# Patient Record
Sex: FEMALE | Race: ASIAN | NOT HISPANIC OR LATINO | Employment: UNEMPLOYED | URBAN - METROPOLITAN AREA
[De-identification: names, ages, dates, MRNs, and addresses within clinical notes are randomized per-mention and may not be internally consistent; named-entity substitution may affect disease eponyms.]

---

## 2019-09-27 ENCOUNTER — OFFICE VISIT - HEALTHEAST (OUTPATIENT)
Dept: FAMILY MEDICINE | Facility: CLINIC | Age: 24
End: 2019-09-27

## 2019-09-27 DIAGNOSIS — Z32.01 PREGNANCY TEST POSITIVE: ICD-10-CM

## 2019-09-27 DIAGNOSIS — O03.9 MISCARRIAGE: ICD-10-CM

## 2019-09-27 DIAGNOSIS — J45.21 MILD INTERMITTENT ASTHMA WITH EXACERBATION: ICD-10-CM

## 2019-09-27 DIAGNOSIS — R73.02 IGT (IMPAIRED GLUCOSE TOLERANCE): ICD-10-CM

## 2019-09-27 DIAGNOSIS — E55.9 VITAMIN D DEFICIENCY DISEASE: ICD-10-CM

## 2019-09-27 DIAGNOSIS — Z00.00 ENCOUNTER FOR MEDICAL EXAMINATION TO ESTABLISH CARE: ICD-10-CM

## 2019-09-27 LAB
ALBUMIN SERPL-MCNC: 3.9 G/DL (ref 3.5–5)
ALBUMIN UR-MCNC: NEGATIVE MG/DL
ALP SERPL-CCNC: 58 U/L (ref 45–120)
ALT SERPL W P-5'-P-CCNC: 14 U/L (ref 0–45)
ANION GAP SERPL CALCULATED.3IONS-SCNC: 11 MMOL/L (ref 5–18)
APPEARANCE UR: CLEAR
AST SERPL W P-5'-P-CCNC: 17 U/L (ref 0–40)
BACTERIA #/AREA URNS HPF: ABNORMAL HPF
BASOPHILS # BLD AUTO: 0.1 THOU/UL (ref 0–0.2)
BASOPHILS NFR BLD AUTO: 1 % (ref 0–2)
BILIRUB SERPL-MCNC: 0.2 MG/DL (ref 0–1)
BILIRUB UR QL STRIP: NEGATIVE
BUN SERPL-MCNC: 8 MG/DL (ref 8–22)
CALCIUM SERPL-MCNC: 10.1 MG/DL (ref 8.5–10.5)
CHLORIDE BLD-SCNC: 108 MMOL/L (ref 98–107)
CO2 SERPL-SCNC: 22 MMOL/L (ref 22–31)
COLOR UR AUTO: ABNORMAL
CREAT SERPL-MCNC: 0.73 MG/DL (ref 0.6–1.1)
EOSINOPHIL # BLD AUTO: 0.4 THOU/UL (ref 0–0.4)
EOSINOPHIL NFR BLD AUTO: 5 % (ref 0–6)
ERYTHROCYTE [DISTWIDTH] IN BLOOD BY AUTOMATED COUNT: 11.8 % (ref 11–14.5)
FERRITIN SERPL-MCNC: 171 NG/ML (ref 10–130)
GFR SERPL CREATININE-BSD FRML MDRD: >60 ML/MIN/1.73M2
GLUCOSE BLD-MCNC: 127 MG/DL (ref 70–125)
GLUCOSE UR STRIP-MCNC: NEGATIVE MG/DL
HBA1C MFR BLD: 5.5 % (ref 3.5–6)
HCG SERPL-ACNC: 2167 MLU/ML (ref 0–4)
HCT VFR BLD AUTO: 36.9 % (ref 35–47)
HGB BLD-MCNC: 12.3 G/DL (ref 12–16)
HGB UR QL STRIP: ABNORMAL
HIV 1+2 AB+HIV1 P24 AG SERPL QL IA: NEGATIVE
KETONES UR STRIP-MCNC: NEGATIVE MG/DL
LDLC SERPL CALC-MCNC: 146 MG/DL
LEUKOCYTE ESTERASE UR QL STRIP: ABNORMAL
LYMPHOCYTES # BLD AUTO: 2.7 THOU/UL (ref 0.8–4.4)
LYMPHOCYTES NFR BLD AUTO: 31 % (ref 20–40)
MCH RBC QN AUTO: 28.6 PG (ref 27–34)
MCHC RBC AUTO-ENTMCNC: 33.2 G/DL (ref 32–36)
MCV RBC AUTO: 86 FL (ref 80–100)
MONOCYTES # BLD AUTO: 0.5 THOU/UL (ref 0–0.9)
MONOCYTES NFR BLD AUTO: 6 % (ref 2–10)
NEUTROPHILS # BLD AUTO: 4.8 THOU/UL (ref 2–7.7)
NEUTROPHILS NFR BLD AUTO: 56 % (ref 50–70)
NITRATE UR QL: NEGATIVE
PH UR STRIP: 6 [PH] (ref 5–8)
PLATELET # BLD AUTO: 262 THOU/UL (ref 140–440)
PMV BLD AUTO: 7.9 FL (ref 7–10)
POTASSIUM BLD-SCNC: 3.9 MMOL/L (ref 3.5–5)
PROT SERPL-MCNC: 7.5 G/DL (ref 6–8)
RBC # BLD AUTO: 4.28 MILL/UL (ref 3.8–5.4)
RBC #/AREA URNS AUTO: ABNORMAL HPF
SODIUM SERPL-SCNC: 141 MMOL/L (ref 136–145)
SP GR UR STRIP: 1.01 (ref 1–1.03)
SQUAMOUS #/AREA URNS AUTO: ABNORMAL LPF
UROBILINOGEN UR STRIP-ACNC: ABNORMAL
WBC #/AREA URNS AUTO: ABNORMAL HPF
WBC: 8.5 THOU/UL (ref 4–11)

## 2019-09-27 ASSESSMENT — MIFFLIN-ST. JEOR: SCORE: 1319.07

## 2019-09-28 ENCOUNTER — COMMUNICATION - HEALTHEAST (OUTPATIENT)
Dept: FAMILY MEDICINE | Facility: CLINIC | Age: 24
End: 2019-09-28

## 2019-09-28 LAB
BACTERIA SPEC CULT: NO GROWTH
HBV SURFACE AG SERPL QL IA: NEGATIVE

## 2019-09-30 LAB
HAV IGG SER QL IA: POSITIVE
HBV CORE AB SERPL QL IA: NEGATIVE
HBV SURFACE AB SERPL IA-ACNC: POSITIVE M[IU]/ML
HCV AB SERPL QL IA: NEGATIVE
STRONGYLOIDES IGG SER IA-ACNC: 0.2 IV
VZV IGG SER QL IA: POSITIVE

## 2019-10-01 ENCOUNTER — COMMUNICATION - HEALTHEAST (OUTPATIENT)
Dept: FAMILY MEDICINE | Facility: CLINIC | Age: 24
End: 2019-10-01

## 2019-10-01 ENCOUNTER — AMBULATORY - HEALTHEAST (OUTPATIENT)
Dept: FAMILY MEDICINE | Facility: CLINIC | Age: 24
End: 2019-10-01

## 2019-10-01 DIAGNOSIS — O03.9 MISCARRIAGE: ICD-10-CM

## 2019-10-01 DIAGNOSIS — N94.89 UTERINE CRAMPING: ICD-10-CM

## 2019-10-01 RX ORDER — ACETAMINOPHEN 500 MG
1000 TABLET ORAL
Status: SHIPPED | COMMUNITY
Start: 2019-09-26 | End: 2021-12-08

## 2019-10-03 LAB — SCHISTOSOMA IGG SER QL: NEGATIVE

## 2019-10-15 ENCOUNTER — OFFICE VISIT - HEALTHEAST (OUTPATIENT)
Dept: FAMILY MEDICINE | Facility: CLINIC | Age: 24
End: 2019-10-15

## 2019-10-15 ENCOUNTER — COMMUNICATION - HEALTHEAST (OUTPATIENT)
Dept: FAMILY MEDICINE | Facility: CLINIC | Age: 24
End: 2019-10-15

## 2019-10-15 DIAGNOSIS — O03.9 MISCARRIAGE: ICD-10-CM

## 2019-10-15 DIAGNOSIS — Z23 NEED FOR IMMUNIZATION AGAINST INFLUENZA: ICD-10-CM

## 2019-10-15 DIAGNOSIS — Z30.013 ENCOUNTER FOR PRESCRIPTION FOR PROGESTIN-ONLY INJECTED CONTRACEPTIVE: ICD-10-CM

## 2019-10-15 DIAGNOSIS — Z23 NEED FOR TETANUS BOOSTER: ICD-10-CM

## 2019-10-15 DIAGNOSIS — K59.03 DRUG-INDUCED CONSTIPATION: ICD-10-CM

## 2019-10-15 DIAGNOSIS — J45.991 COUGH VARIANT ASTHMA: ICD-10-CM

## 2019-10-15 DIAGNOSIS — Z23 NEED FOR HPV VACCINE: ICD-10-CM

## 2019-10-15 DIAGNOSIS — Z30.431 SURVEILLANCE OF PREVIOUSLY PRESCRIBED INTRAUTERINE CONTRACEPTIVE DEVICE: ICD-10-CM

## 2019-10-15 DIAGNOSIS — J45.21 MILD INTERMITTENT ASTHMA WITH EXACERBATION: ICD-10-CM

## 2019-10-15 LAB
BASOPHILS # BLD AUTO: 0 THOU/UL (ref 0–0.2)
BASOPHILS NFR BLD AUTO: 0 % (ref 0–2)
EOSINOPHIL # BLD AUTO: 0.4 THOU/UL (ref 0–0.4)
EOSINOPHIL NFR BLD AUTO: 6 % (ref 0–6)
ERYTHROCYTE [DISTWIDTH] IN BLOOD BY AUTOMATED COUNT: 12.5 % (ref 11–14.5)
HCG SERPL-ACNC: 464 MLU/ML (ref 0–4)
HCG UR QL: POSITIVE
HCT VFR BLD AUTO: 36.9 % (ref 35–47)
HGB BLD-MCNC: 12.5 G/DL (ref 12–16)
LYMPHOCYTES # BLD AUTO: 2.6 THOU/UL (ref 0.8–4.4)
LYMPHOCYTES NFR BLD AUTO: 37 % (ref 20–40)
MCH RBC QN AUTO: 29 PG (ref 27–34)
MCHC RBC AUTO-ENTMCNC: 33.9 G/DL (ref 32–36)
MCV RBC AUTO: 85 FL (ref 80–100)
MONOCYTES # BLD AUTO: 0.4 THOU/UL (ref 0–0.9)
MONOCYTES NFR BLD AUTO: 6 % (ref 2–10)
NEUTROPHILS # BLD AUTO: 3.6 THOU/UL (ref 2–7.7)
NEUTROPHILS NFR BLD AUTO: 51 % (ref 50–70)
PLATELET # BLD AUTO: 269 THOU/UL (ref 140–440)
PMV BLD AUTO: 7.7 FL (ref 7–10)
RBC # BLD AUTO: 4.32 MILL/UL (ref 3.8–5.4)
WBC: 7.1 THOU/UL (ref 4–11)

## 2019-10-15 RX ORDER — AMOXICILLIN 250 MG
2 CAPSULE ORAL DAILY PRN
Qty: 60 TABLET | Refills: 5 | Status: SHIPPED | OUTPATIENT
Start: 2019-10-15 | End: 2021-12-08

## 2019-10-15 ASSESSMENT — MIFFLIN-ST. JEOR: SCORE: 1323.76

## 2019-10-16 ENCOUNTER — COMMUNICATION - HEALTHEAST (OUTPATIENT)
Dept: SCHEDULING | Facility: CLINIC | Age: 24
End: 2019-10-16

## 2019-10-16 DIAGNOSIS — J45.991 COUGH VARIANT ASTHMA: ICD-10-CM

## 2019-10-28 ENCOUNTER — COMMUNICATION - HEALTHEAST (OUTPATIENT)
Dept: SCHEDULING | Facility: CLINIC | Age: 24
End: 2019-10-28

## 2019-10-29 ENCOUNTER — OFFICE VISIT - HEALTHEAST (OUTPATIENT)
Dept: FAMILY MEDICINE | Facility: CLINIC | Age: 24
End: 2019-10-29

## 2019-10-29 DIAGNOSIS — Z23 NEED FOR TETANUS BOOSTER: ICD-10-CM

## 2019-10-29 DIAGNOSIS — Z30.013 EVALUATION FOR CONTRACEPTIVE INJECTION: ICD-10-CM

## 2019-10-29 DIAGNOSIS — J45.991 COUGH VARIANT ASTHMA: ICD-10-CM

## 2019-10-29 DIAGNOSIS — Z23 NEED FOR HPV VACCINE: ICD-10-CM

## 2019-10-29 DIAGNOSIS — Z30.45 INITIAL ENCOUNTER FOR MANAGEMENT OF CONTRACEPTIVE PATCH USE: ICD-10-CM

## 2019-10-29 LAB — HCG UR QL: POSITIVE

## 2019-10-29 ASSESSMENT — MIFFLIN-ST. JEOR: SCORE: 1329.43

## 2019-10-30 ENCOUNTER — COMMUNICATION - HEALTHEAST (OUTPATIENT)
Dept: NURSING | Facility: CLINIC | Age: 24
End: 2019-10-30

## 2019-10-30 ENCOUNTER — COMMUNICATION - HEALTHEAST (OUTPATIENT)
Dept: FAMILY MEDICINE | Facility: CLINIC | Age: 24
End: 2019-10-30

## 2019-10-31 ENCOUNTER — COMMUNICATION - HEALTHEAST (OUTPATIENT)
Dept: NURSING | Facility: CLINIC | Age: 24
End: 2019-10-31

## 2019-11-01 ENCOUNTER — COMMUNICATION - HEALTHEAST (OUTPATIENT)
Dept: NURSING | Facility: CLINIC | Age: 24
End: 2019-11-01

## 2019-11-01 ENCOUNTER — COMMUNICATION - HEALTHEAST (OUTPATIENT)
Dept: FAMILY MEDICINE | Facility: CLINIC | Age: 24
End: 2019-11-01

## 2019-11-12 ENCOUNTER — OFFICE VISIT - HEALTHEAST (OUTPATIENT)
Dept: PHARMACY | Facility: CLINIC | Age: 24
End: 2019-11-12

## 2019-11-12 DIAGNOSIS — J45.909 ASTHMA, UNSPECIFIED ASTHMA SEVERITY, UNSPECIFIED WHETHER COMPLICATED, UNSPECIFIED WHETHER PERSISTENT: ICD-10-CM

## 2019-11-18 ENCOUNTER — COMMUNICATION - HEALTHEAST (OUTPATIENT)
Dept: CARE COORDINATION | Facility: CLINIC | Age: 24
End: 2019-11-18

## 2019-11-19 ENCOUNTER — OFFICE VISIT - HEALTHEAST (OUTPATIENT)
Dept: FAMILY MEDICINE | Facility: CLINIC | Age: 24
End: 2019-11-19

## 2019-11-19 DIAGNOSIS — Z23 NEED FOR HPV VACCINATION: ICD-10-CM

## 2019-11-19 DIAGNOSIS — H10.13 ALLERGIC CONJUNCTIVITIS, BILATERAL: ICD-10-CM

## 2019-11-19 DIAGNOSIS — E66.3 OVERWEIGHT: ICD-10-CM

## 2019-11-19 DIAGNOSIS — O03.9 MISCARRIAGE: ICD-10-CM

## 2019-11-19 DIAGNOSIS — Z00.00 ENCOUNTER FOR MEDICAL EXAMINATION TO ESTABLISH CARE: ICD-10-CM

## 2019-11-19 DIAGNOSIS — H04.123 DRY EYES: ICD-10-CM

## 2019-11-19 RX ORDER — CARBOXYMETHYLCELLULOSE SODIUM 10 MG/ML
GEL OPHTHALMIC
Qty: 30 ML | Refills: 12 | Status: SHIPPED | OUTPATIENT
Start: 2019-11-19 | End: 2021-12-08

## 2019-11-19 ASSESSMENT — MIFFLIN-ST. JEOR: SCORE: 1343.24

## 2019-12-05 ENCOUNTER — OFFICE VISIT - HEALTHEAST (OUTPATIENT)
Dept: PHARMACY | Facility: CLINIC | Age: 24
End: 2019-12-05

## 2019-12-30 ENCOUNTER — COMMUNICATION - HEALTHEAST (OUTPATIENT)
Dept: NURSING | Facility: CLINIC | Age: 24
End: 2019-12-30

## 2020-01-02 ENCOUNTER — AMBULATORY - HEALTHEAST (OUTPATIENT)
Dept: NURSING | Facility: CLINIC | Age: 25
End: 2020-01-02

## 2020-01-02 ENCOUNTER — COMMUNICATION - HEALTHEAST (OUTPATIENT)
Dept: FAMILY MEDICINE | Facility: CLINIC | Age: 25
End: 2020-01-02

## 2020-01-02 ENCOUNTER — AMBULATORY - HEALTHEAST (OUTPATIENT)
Dept: LAB | Facility: CLINIC | Age: 25
End: 2020-01-02

## 2020-01-02 ENCOUNTER — AMBULATORY - HEALTHEAST (OUTPATIENT)
Dept: FAMILY MEDICINE | Facility: CLINIC | Age: 25
End: 2020-01-02

## 2020-01-02 DIAGNOSIS — R73.9 ELEVATED RANDOM BLOOD GLUCOSE LEVEL: ICD-10-CM

## 2020-01-02 DIAGNOSIS — E66.3 OVERWEIGHT: ICD-10-CM

## 2020-01-02 DIAGNOSIS — Z23 NEED FOR HPV VACCINATION: ICD-10-CM

## 2020-01-02 DIAGNOSIS — O03.9 MISCARRIAGE: ICD-10-CM

## 2020-01-02 LAB
HBA1C MFR BLD: 5.7 % (ref 3.5–6)
HCG SERPL-ACNC: <2 MLU/ML (ref 0–4)

## 2020-01-03 ENCOUNTER — OFFICE VISIT - HEALTHEAST (OUTPATIENT)
Dept: FAMILY MEDICINE | Facility: CLINIC | Age: 25
End: 2020-01-03

## 2020-01-03 DIAGNOSIS — Z12.4 CERVICAL CANCER SCREENING: ICD-10-CM

## 2020-01-03 DIAGNOSIS — R10.2 PELVIC PAIN IN FEMALE: ICD-10-CM

## 2020-01-03 DIAGNOSIS — Z11.8 SPECIAL SCREENING EXAMINATION FOR CHLAMYDIAL DISEASE: ICD-10-CM

## 2020-01-03 LAB — HCG UR QL: NEGATIVE

## 2020-01-03 ASSESSMENT — MIFFLIN-ST. JEOR: SCORE: 1334.16

## 2020-01-06 LAB
C TRACH DNA SPEC QL PROBE+SIG AMP: NEGATIVE
N GONORRHOEA DNA SPEC QL NAA+PROBE: NEGATIVE

## 2020-01-07 LAB
BKR LAB AP ABNORMAL BLEEDING: YES
BKR LAB AP BIRTH CONTROL/HORMONES: NORMAL
BKR LAB AP CERVICAL APPEARANCE: NORMAL
BKR LAB AP GYN ADEQUACY: NORMAL
BKR LAB AP GYN INTERPRETATION: NORMAL
BKR LAB AP HPV REFLEX: NORMAL
BKR LAB AP LMP: NORMAL
BKR LAB AP PATIENT STATUS: NORMAL
BKR LAB AP PREVIOUS ABNORMAL: NO
BKR LAB AP PREVIOUS NORMAL: NORMAL
HIGH RISK?: NO
PATH REPORT.COMMENTS IMP SPEC: NORMAL
RESULT FLAG (HE HISTORICAL CONVERSION): NORMAL

## 2020-01-08 ENCOUNTER — HOSPITAL ENCOUNTER (OUTPATIENT)
Dept: ULTRASOUND IMAGING | Facility: HOSPITAL | Age: 25
Discharge: HOME | End: 2020-01-08
Attending: FAMILY MEDICINE

## 2020-01-08 ENCOUNTER — COMMUNICATION - HEALTHEAST (OUTPATIENT)
Dept: FAMILY MEDICINE | Facility: CLINIC | Age: 25
End: 2020-01-08

## 2020-01-08 DIAGNOSIS — R10.2 PELVIC PAIN IN FEMALE: ICD-10-CM

## 2020-01-15 ENCOUNTER — COMMUNICATION - HEALTHEAST (OUTPATIENT)
Dept: FAMILY MEDICINE | Facility: CLINIC | Age: 25
End: 2020-01-15

## 2020-01-21 ENCOUNTER — AMBULATORY - HEALTHEAST (OUTPATIENT)
Dept: NURSING | Facility: CLINIC | Age: 25
End: 2020-01-21

## 2020-01-21 ENCOUNTER — OFFICE VISIT - HEALTHEAST (OUTPATIENT)
Dept: FAMILY MEDICINE | Facility: CLINIC | Age: 25
End: 2020-01-21

## 2020-01-21 DIAGNOSIS — H53.8 BLURRED VISION: ICD-10-CM

## 2020-01-21 DIAGNOSIS — R06.83 SNORING: ICD-10-CM

## 2020-01-21 DIAGNOSIS — G47.30 SLEEP APNEA, UNSPECIFIED TYPE: ICD-10-CM

## 2020-01-21 DIAGNOSIS — G44.219 EPISODIC TENSION-TYPE HEADACHE, NOT INTRACTABLE: ICD-10-CM

## 2020-01-21 LAB
ALBUMIN SERPL-MCNC: 4.1 G/DL (ref 3.5–5)
ALP SERPL-CCNC: 59 U/L (ref 45–120)
ALT SERPL W P-5'-P-CCNC: 24 U/L (ref 0–45)
ANION GAP SERPL CALCULATED.3IONS-SCNC: 13 MMOL/L (ref 5–18)
AST SERPL W P-5'-P-CCNC: 20 U/L (ref 0–40)
BASOPHILS # BLD AUTO: 0.1 THOU/UL (ref 0–0.2)
BASOPHILS NFR BLD AUTO: 1 % (ref 0–2)
BILIRUB SERPL-MCNC: 0.3 MG/DL (ref 0–1)
BUN SERPL-MCNC: 11 MG/DL (ref 8–22)
CALCIUM SERPL-MCNC: 9.4 MG/DL (ref 8.5–10.5)
CHLORIDE BLD-SCNC: 107 MMOL/L (ref 98–107)
CO2 SERPL-SCNC: 21 MMOL/L (ref 22–31)
CREAT SERPL-MCNC: 0.62 MG/DL (ref 0.6–1.1)
EOSINOPHIL # BLD AUTO: 0.7 THOU/UL (ref 0–0.4)
EOSINOPHIL NFR BLD AUTO: 7 % (ref 0–6)
ERYTHROCYTE [DISTWIDTH] IN BLOOD BY AUTOMATED COUNT: 12 % (ref 11–14.5)
GFR SERPL CREATININE-BSD FRML MDRD: >60 ML/MIN/1.73M2
GLUCOSE BLD-MCNC: 79 MG/DL (ref 70–125)
HCT VFR BLD AUTO: 42.8 % (ref 35–47)
HGB BLD-MCNC: 14.4 G/DL (ref 12–16)
LYMPHOCYTES # BLD AUTO: 3 THOU/UL (ref 0.8–4.4)
LYMPHOCYTES NFR BLD AUTO: 33 % (ref 20–40)
MCH RBC QN AUTO: 28.1 PG (ref 27–34)
MCHC RBC AUTO-ENTMCNC: 33.6 G/DL (ref 32–36)
MCV RBC AUTO: 84 FL (ref 80–100)
MONOCYTES # BLD AUTO: 0.5 THOU/UL (ref 0–0.9)
MONOCYTES NFR BLD AUTO: 5 % (ref 2–10)
NEUTROPHILS # BLD AUTO: 5 THOU/UL (ref 2–7.7)
NEUTROPHILS NFR BLD AUTO: 54 % (ref 50–70)
PLATELET # BLD AUTO: 266 THOU/UL (ref 140–440)
PMV BLD AUTO: 8.5 FL (ref 7–10)
POTASSIUM BLD-SCNC: 3.8 MMOL/L (ref 3.5–5)
PROT SERPL-MCNC: 7.7 G/DL (ref 6–8)
RBC # BLD AUTO: 5.11 MILL/UL (ref 3.8–5.4)
SODIUM SERPL-SCNC: 141 MMOL/L (ref 136–145)
TSH SERPL DL<=0.005 MIU/L-ACNC: 0.75 UIU/ML (ref 0.3–5)
WBC: 9.3 THOU/UL (ref 4–11)

## 2020-01-21 ASSESSMENT — MIFFLIN-ST. JEOR: SCORE: 1341.9

## 2020-01-24 ENCOUNTER — RECORDS - HEALTHEAST (OUTPATIENT)
Dept: ADMINISTRATIVE | Facility: OTHER | Age: 25
End: 2020-01-24

## 2020-01-24 ENCOUNTER — COMMUNICATION - HEALTHEAST (OUTPATIENT)
Dept: FAMILY MEDICINE | Facility: CLINIC | Age: 25
End: 2020-01-24

## 2020-01-24 LAB — RETINOPATHY: NEGATIVE

## 2020-01-28 ENCOUNTER — RECORDS - HEALTHEAST (OUTPATIENT)
Dept: HEALTH INFORMATION MANAGEMENT | Facility: CLINIC | Age: 25
End: 2020-01-28

## 2020-02-12 ENCOUNTER — AMBULATORY - HEALTHEAST (OUTPATIENT)
Dept: EDUCATION SERVICES | Facility: CLINIC | Age: 25
End: 2020-02-12

## 2020-02-12 ENCOUNTER — AMBULATORY - HEALTHEAST (OUTPATIENT)
Dept: FAMILY MEDICINE | Facility: CLINIC | Age: 25
End: 2020-02-12

## 2020-02-12 DIAGNOSIS — R73.02 IGT (IMPAIRED GLUCOSE TOLERANCE): ICD-10-CM

## 2020-02-12 DIAGNOSIS — R73.03 PREDIABETES: ICD-10-CM

## 2020-02-12 DIAGNOSIS — E66.3 OVERWEIGHT: ICD-10-CM

## 2020-02-19 ENCOUNTER — COMMUNICATION - HEALTHEAST (OUTPATIENT)
Dept: NURSING | Facility: CLINIC | Age: 25
End: 2020-02-19

## 2020-02-20 ENCOUNTER — AMBULATORY - HEALTHEAST (OUTPATIENT)
Dept: CARE COORDINATION | Facility: CLINIC | Age: 25
End: 2020-02-20

## 2020-02-20 DIAGNOSIS — Z60.3 LANGUAGE BARRIER: ICD-10-CM

## 2020-02-20 DIAGNOSIS — Z59.71 INSURANCE COVERAGE PROBLEMS: ICD-10-CM

## 2020-02-20 DIAGNOSIS — Z75.8 LANGUAGE BARRIER: ICD-10-CM

## 2020-02-20 SDOH — SOCIAL STABILITY - SOCIAL INSECURITY: ACCULTURATION DIFFICULTY: Z60.3

## 2020-03-11 ENCOUNTER — OFFICE VISIT - HEALTHEAST (OUTPATIENT)
Dept: FAMILY MEDICINE | Facility: CLINIC | Age: 25
End: 2020-03-11

## 2020-03-11 DIAGNOSIS — E66.09 CLASS 1 OBESITY DUE TO EXCESS CALORIES WITH SERIOUS COMORBIDITY AND BODY MASS INDEX (BMI) OF 31.0 TO 31.9 IN ADULT: ICD-10-CM

## 2020-03-11 DIAGNOSIS — R73.03 PREDIABETES: ICD-10-CM

## 2020-03-11 DIAGNOSIS — Z23 IMMUNIZATION DUE: ICD-10-CM

## 2020-03-11 DIAGNOSIS — E66.811 CLASS 1 OBESITY DUE TO EXCESS CALORIES WITH SERIOUS COMORBIDITY AND BODY MASS INDEX (BMI) OF 31.0 TO 31.9 IN ADULT: ICD-10-CM

## 2020-04-14 ENCOUNTER — COMMUNICATION - HEALTHEAST (OUTPATIENT)
Dept: FAMILY MEDICINE | Facility: CLINIC | Age: 25
End: 2020-04-14

## 2020-04-21 ENCOUNTER — AMBULATORY - HEALTHEAST (OUTPATIENT)
Dept: NURSING | Facility: CLINIC | Age: 25
End: 2020-04-21

## 2020-04-21 DIAGNOSIS — R73.03 PREDIABETES: ICD-10-CM

## 2020-04-21 LAB — HBA1C MFR BLD: 5.6 % (ref 3.5–6)

## 2020-04-28 ENCOUNTER — OFFICE VISIT - HEALTHEAST (OUTPATIENT)
Dept: FAMILY MEDICINE | Facility: CLINIC | Age: 25
End: 2020-04-28

## 2020-04-28 DIAGNOSIS — R10.2 PELVIC PAIN IN FEMALE: ICD-10-CM

## 2020-05-04 ENCOUNTER — HOSPITAL ENCOUNTER (OUTPATIENT)
Dept: ULTRASOUND IMAGING | Facility: HOSPITAL | Age: 25
Discharge: HOME | End: 2020-05-04
Attending: FAMILY MEDICINE

## 2020-05-04 DIAGNOSIS — R10.2 PELVIC PAIN IN FEMALE: ICD-10-CM

## 2020-05-19 ENCOUNTER — COMMUNICATION - HEALTHEAST (OUTPATIENT)
Dept: FAMILY MEDICINE | Facility: CLINIC | Age: 25
End: 2020-05-19

## 2020-07-02 ENCOUNTER — COMMUNICATION - HEALTHEAST (OUTPATIENT)
Dept: FAMILY MEDICINE | Facility: CLINIC | Age: 25
End: 2020-07-02

## 2020-07-02 DIAGNOSIS — Z30.013 EVALUATION FOR CONTRACEPTIVE INJECTION: ICD-10-CM

## 2020-07-14 ENCOUNTER — COMMUNICATION - HEALTHEAST (OUTPATIENT)
Dept: FAMILY MEDICINE | Facility: CLINIC | Age: 25
End: 2020-07-14

## 2020-07-15 ENCOUNTER — AMBULATORY - HEALTHEAST (OUTPATIENT)
Dept: NURSING | Facility: CLINIC | Age: 25
End: 2020-07-15

## 2020-09-29 ENCOUNTER — AMBULATORY - HEALTHEAST (OUTPATIENT)
Dept: NURSING | Facility: CLINIC | Age: 25
End: 2020-09-29

## 2020-10-09 ENCOUNTER — OFFICE VISIT - HEALTHEAST (OUTPATIENT)
Dept: FAMILY MEDICINE | Facility: CLINIC | Age: 25
End: 2020-10-09

## 2020-10-09 DIAGNOSIS — Z30.09 ENCOUNTER FOR COUNSELING REGARDING INITIATION OF OTHER CONTRACEPTIVE MEASURE: ICD-10-CM

## 2020-10-09 ASSESSMENT — MIFFLIN-ST. JEOR: SCORE: 1378.19

## 2020-10-23 ENCOUNTER — COMMUNICATION - HEALTHEAST (OUTPATIENT)
Dept: FAMILY MEDICINE | Facility: CLINIC | Age: 25
End: 2020-10-23

## 2020-10-23 DIAGNOSIS — J45.21 MILD INTERMITTENT ASTHMA WITH EXACERBATION: ICD-10-CM

## 2020-10-25 RX ORDER — ALBUTEROL SULFATE 90 UG/1
2 AEROSOL, METERED RESPIRATORY (INHALATION) EVERY 6 HOURS PRN
Qty: 36 G | Refills: 2 | Status: SHIPPED | OUTPATIENT
Start: 2020-10-25 | End: 2021-10-06

## 2020-12-21 ENCOUNTER — OFFICE VISIT - HEALTHEAST (OUTPATIENT)
Dept: FAMILY MEDICINE | Facility: CLINIC | Age: 25
End: 2020-12-21

## 2020-12-21 DIAGNOSIS — Z78.9 USES BIRTH CONTROL: ICD-10-CM

## 2020-12-21 DIAGNOSIS — N94.3 PMS (PREMENSTRUAL SYNDROME): ICD-10-CM

## 2020-12-21 DIAGNOSIS — R10.84 ABDOMINAL PAIN, GENERALIZED: ICD-10-CM

## 2020-12-21 DIAGNOSIS — M54.32 SCIATICA OF LEFT SIDE: ICD-10-CM

## 2020-12-21 DIAGNOSIS — R10.2 PELVIC PAIN IN FEMALE: ICD-10-CM

## 2020-12-21 RX ORDER — OXYCODONE HYDROCHLORIDE 5 MG/1
5 TABLET ORAL EVERY 6 HOURS PRN
Qty: 10 TABLET | Refills: 0 | Status: SHIPPED | OUTPATIENT
Start: 2020-12-21 | End: 2021-10-06

## 2021-01-07 ENCOUNTER — OFFICE VISIT - HEALTHEAST (OUTPATIENT)
Dept: FAMILY MEDICINE | Facility: CLINIC | Age: 26
End: 2021-01-07

## 2021-01-07 DIAGNOSIS — Z31.9 DESIRE FOR PREGNANCY: ICD-10-CM

## 2021-01-07 DIAGNOSIS — M54.32 SCIATICA OF LEFT SIDE: ICD-10-CM

## 2021-01-07 RX ORDER — PRENATAL VIT/IRON FUM/FOLIC AC 27MG-0.8MG
1 TABLET ORAL DAILY
Qty: 90 TABLET | Refills: 3 | Status: SHIPPED | OUTPATIENT
Start: 2021-01-07 | End: 2021-12-08

## 2021-01-07 RX ORDER — GABAPENTIN 100 MG/1
100 CAPSULE ORAL 3 TIMES DAILY PRN
Qty: 90 CAPSULE | Refills: 3 | Status: SHIPPED | OUTPATIENT
Start: 2021-01-07 | End: 2021-10-06

## 2021-01-07 ASSESSMENT — MIFFLIN-ST. JEOR: SCORE: 1363.45

## 2021-01-18 ENCOUNTER — COMMUNICATION - HEALTHEAST (OUTPATIENT)
Dept: FAMILY MEDICINE | Facility: CLINIC | Age: 26
End: 2021-01-18

## 2021-01-18 DIAGNOSIS — M54.32 SCIATICA OF LEFT SIDE: ICD-10-CM

## 2021-05-28 ASSESSMENT — ASTHMA QUESTIONNAIRES
ACT_TOTALSCORE: 8

## 2021-06-01 NOTE — TELEPHONE ENCOUNTER
Patient scheduled mid-November per MD request. Patient telling  that she would like to be seen sooner. CMT called patient this morning at request of PCP to ask how she is doing with recent miscarriage which is documented in additional telephone note of different date.     Can CMT used reserved or same-day slot? If so, when as PCP was hoping that bleeding and cramping would lessen. PCP stated that if bleeding and cramping had not lessened with several days from now, US would be warranted.

## 2021-06-01 NOTE — TELEPHONE ENCOUNTER
Christiano - 2 things  1) when PCP tries to order the oxycodone, it states that a different med, Nycynta, is preferred and would cost less. Does Fan agree to trying this new med? Should PCP prescribe both and she can find out the cost difference?  2) there is no pharmacy on the chart. Either we need a pharmacy to send the scripts to OR she has to come to the clinic to  the paper scripts.

## 2021-06-01 NOTE — TELEPHONE ENCOUNTER
ELLE contacted the patient with a Penelope .     1) the patient is doing okay with the process.   2) the patient still has cramps but the bleeding has lessened with time. The blood clots are decreasing in size but still producing small clots.    3) the patient would like a refill of the Oxycodone.     The patient states that she agrees with MD regarding US plan. Patient agrees to call on Friday to give an update.

## 2021-06-01 NOTE — PROGRESS NOTES
OFFICE VISIT NOTE  Assessment & Plan   Fan Say Renee is a 24 y.o. female establishing care, but in the midst of what looks like a miscarriage. We are rechecking her HCG level to confirm the miscarriage - will get results tomorrow. Take prenatal vitamins for a month to help with recovery. Take them longer if she plans to re-try for a pregnancy.    In the mean time, she'll stay well hydrated, take pain meds prn - I added ibuprofen to her regimen, and rest.   She plans a trip back to [place] to finish bringing their things to [place]. She agrees to get her medical records, too, and bring them in. We'll draw refugee labs so we have some solid information about her immunization status.  F/u will be determined by lab results.    I warned her about [place] tan, and encouraged her to get coats and boots, etc.    Diagnoses and all orders for this visit:    Pregnancy test positive  -     Beta-hCG, Quantitative  -     prenatal vit-iron fum-folic ac (PRENATAL VITAMIN) 27 mg iron- 0.8 mg Tab tablet; Take 1 tablet by mouth daily.  Dispense: 90 tablet; Refill: 3    Mild intermittent asthma with exacerbation  -     albuterol (PROAIR HFA;PROVENTIL HFA;VENTOLIN HFA) 90 mcg/actuation inhaler; Inhale 2 puffs every 6 (six) hours as needed for wheezing.  Dispense: 1 each; Refill: 11    Encounter for medical examination to establish care  -     Comprehensive Metabolic Panel  -     Hepatitis A Immune Status  -     Hepatitis B Core Antibody (Anti-HBc)  -     Hepatitis B Surface Antibody (Anti-HBs) Vaccine Check  -     HM1(CBC and Differential)  -     Hepatitis B Surface Antigen (HBsAG)  -     Hepatitis C Antibody (Anti-HCV)  -     HIV Antigen/Antibody Screening Cascade  -     LDL Cholesterol, Direct  -     Schistosoma Antibody, IgG  -     Strongyloides Antibody, IgG,S  -     Varicella Zoster Immune Status Antibody, IgG  -     HM1 (CBC with Diff)  -     Urinalysis-UC if Indicated    Miscarriage  -     Ferritin  -     Glycosylated Hemoglobin  A1c    IGT (impaired glucose tolerance)  -     Glycosylated Hemoglobin A1c    Vitamin D deficiency disease  -     cholecalciferol, vitamin D3, 5,000 unit capsule; Take 1 capsule (5,000 Units total) by mouth daily.  Dispense: 90 capsule; Refill: 4    Gloria Berg MD        Chief Complaint:  Establish Care  Subjective:   Fan Duran is a 24 y.o. female presenting to the clinic today for establishment of care. She was initially presenting to clinic to confirm a pregnancy but on 9/25 (2 days before the clinic appointment) she presented to the ED with abdominal pain and vaginal bleeding. An u/s showed her pregnancy was about five weeks gestation, but it also suggested she was experiencing a miscarriage: there was no fetal pole or yolk sac. Fan was discharged and sent home the same day, she states she painfully passed a blood clot that she thought might have contained tissue. She understands that she may no longer pregnant she is a bit sad. She was taking prenatal vitamins two months prior to conceiving.     Fan is experiencing an issue with her right thumb. She says it is stuck in a closed position and when she spreads her fingers apart the thumb does not move. This has been going on for about a month, and happens intermittently. When trying to massage the thumb it hurts and she can feel jumping under the skin some times.     Fan has been told she has high blood sugars but not a complete diabetes diagnoses. She would like a test to be done to see if she does have diabetes. While in [place] she was also diagnosed with adult asthma, as she did not have it as a child. She had an inhaler but left it in [place]. Typically, she uses it it three times per month.      PSFHx:  G/P/A - 3/2/1  She moved to [place] 8/26/2019 from [place], with her  and children. Her maternal aunt and maternal grandfather were already in [place].   appropriate sections of PMH completed/filled in   Tobacco Status:  She   "reports that she has never smoked. She has never used smokeless tobacco.    Review of Systems:   No fever.     Denies hepatitis   Admits to asthma diagnoses  Admits to cramping and vaginal bleeding   All other systems negative except as noted above.      Current Outpatient Medications   Medication Sig     albuterol (PROAIR HFA;PROVENTIL HFA;VENTOLIN HFA) 90 mcg/actuation inhaler Inhale 2 puffs every 6 (six) hours as needed for wheezing.     cholecalciferol, vitamin D3, 5,000 unit capsule Take 1 capsule (5,000 Units total) by mouth daily.     prenatal vit-iron fum-folic ac (PRENATAL VITAMIN) 27 mg iron- 0.8 mg Tab tablet Take 1 tablet by mouth daily.         Objective:    BP 98/54 (Patient Site: Left Arm, Patient Position: Sitting, Cuff Size: Adult Regular)   Pulse 84   Temp 99.2  F (37.3  C) (Oral)   Resp 20   Ht 4' 11.13\" (1.502 m)   Wt 148 lb (67.1 kg)   LMP 07/29/2019 (Approximate)   BMI 29.76 kg/m    Physical Exam:  General Appearance: Alert, cooperative, no distress, appears stated age, a bit teary at times  Ht: reg s1s2  Lungs: clear  Gait: stable, even    Labs pending        ADDITIONAL HISTORY SUMMARIZED (2): None.  DECISION TO OBTAIN EXTRA INFORMATION (1): CareEverywhere accessed + patient will bring [place] records  RADIOLOGY TESTS (1): u/s  LABS (1): 9/24/2019 [facility] labs reviewed. Labs ordered   MEDICINE TESTS (1): OB Ultrasound reviewed.  INDEPENDENT REVIEW (2 each): None.       The visit lasted a total of 40 minutes face to face with the patient. Over 50% of the time was spent counseling and educating the patient about establishing of care.    ISaloni, am scribing for and in the presence of, Dr. Berg.    I, Dr. Berg, personally performed the services described in this documentation, as scribed by Saloni Cortes in my presence, and it is both accurate and complete.    Total data points: 4    "

## 2021-06-01 NOTE — TELEPHONE ENCOUNTER
Please call Fan and ask if she's doing ok with the miscarriage? Does she continue to have painful cramping? Does she need more strong pain medication?    If the cramping and bleeding continue several more days, then I'll want to get a pelvic u/s to see what is still inside  Her uterus.

## 2021-06-01 NOTE — TELEPHONE ENCOUNTER
Please help her to make an appointment Tues or Wed Oct 15 or 16th. On the 15th use any reserved spot. The 16th should open up by Wed PM.

## 2021-06-01 NOTE — TELEPHONE ENCOUNTER
Patient scheduled 11/19/2019 at 9:20 AM. Please arrange transportation and call patient. Thank you.

## 2021-06-01 NOTE — TELEPHONE ENCOUNTER
On Saturday afternoon, I called Fan to let her know the HCG results were decreased, showing that she is miscarrying - the pregnancy is ending. She said she understood (I spoke English without an Penelope ). She said she has pain again, and I said she might pass more blood and tissue. She can take the pain meds prn.    Monday 9/30 - please call and help Fan make a follow up appointment in mid-November. She prefers mornings. I hope she can come at 9:20am Tues., Nov 19th (that is a new patient slot - she is a new patient, but her 1st appointment was focused on her miscarriage more than establishing care, so another is needed). If she cannot come then, use a different reserved or New Patient slot.  thanks

## 2021-06-01 NOTE — TELEPHONE ENCOUNTER
CMT called with Penelope . Advised per MD note below.     1) the patient states that she prefers the oxycodone versus the Nycynta.   2) please send the oxycodone to [facility] on [address].

## 2021-06-02 NOTE — TELEPHONE ENCOUNTER
"Situation:  CHW was inform by Bi to reschedule patient appointment on November 11 at 1:00 pm due to provider and CCC staff meeting.    Background:  Patient was schedule with [facility] SW for Assessment to be enroll in [facility].    Per [facility] FRW encounter today, 11/1/19, \"FRW reviewed notes, pt is trying to get prescriptions from pharmacy but since she has a secondary insurance listed ([employer]) they can't bill [place]. FRW called pt to see if she still has this secondary insurance and left VM. FRW will call again early next week. FYI- the pharmacy called on 11/1/19 to get a prior auth for the prescription. (If pt does not have secondary insurance, she will need to [facility] to fix interface issue. They may request a notice that this was cancelled) \"    \"Health Insurance Information:   [place]: Medical Assistance.  Elig Type PX: Pregnant woman  Eligibility Begin Date: 09/01/2019  Eligibility End Date: 04/30/2020     [place]12 - Prepaid Medical Assistance Program (PMAP) delivered through [employer].     INSURANCE COVERAGE  Carrier ID: [id number]  Coverage Types: 05: Pharmacy - Copay per prescription (Provider must bill insurance first.)  Carrier Name:    [employer]\"    Assessment:   CHW assist in reschedule [facility] SW Assessment from November 11 at 1:00 pm to November 18 at 10:00 am.     CHW follow up if she still have secondary health insurance, [employer], and patient say she doesn't have secondary health insurance.  Per patient, the only health insurance she has is [place].  Her health insurance from [place] end back in August.  She will need help [facility] to report or fix interface.      Recommendation:  CHW route this note to [facility] FRW as an FYI and asking if [facility] FRW can assist get this resolved.   "

## 2021-06-02 NOTE — PROGRESS NOTES
Interp: [id number]    Potential [facility] Enrollment Outreach Call: Attempt 1 Community Health Worker called and left a message for the patient. If the patient is returning my call, please transfer the patient to Anabella at ext. [phone].   Next Outreach: 10/31/19    Plan:     - Will attempt one more outreach call to patient, if they do not answer, CHW will send unreachable letter to patient

## 2021-06-02 NOTE — TELEPHONE ENCOUNTER
"Triage call:   Tuesday saw a MD prescription inhaler   Coughing a lot and doesn't have medication   She states that she puts \"cream on her chest from [place]\" which she reports is better.    Hard time with breathing when she     Patient also is requesting to have her appointment for birth control moved up.  Called pharmacy $263.00- Flovent no replacement- for the inhaler.     Paged on call provider- Dr Berg - no call back with first page.     Called patient back to update her on the progress of her request- please call back with an  on the line with PCP detailed advice.     Second page to call back sent to call back care connection- routing to triage bucket at this time.        Maren Huynh RN [facility] Care Connection Triage/Med Refill 10/16/2019 6:27 PM    Reason for Disposition    Caller has URGENT medication question about med that PCP prescribed and triager unable to answer question    Protocols used: MEDICATION QUESTION CALL-A-AH      "

## 2021-06-02 NOTE — TELEPHONE ENCOUNTER
Please call Fan. Let her know her HCG level was around 400, down from over 2000 last time. This is low enough that we can give her the depo shot. She can make a nurse-only visit anytime. The depo order is in. She should also get HPV #1 and Td (which are ordered).

## 2021-06-02 NOTE — TELEPHONE ENCOUNTER
Patient states that she cannot afford the $400 + needed by her [facility]'s pharmacy for QVAR 40 mcg/actuation inhaler and proair inhaler that was filled on 10/16/19.  She states that she sometimes get short of breath at night.  Currently she denies breathing difficulties or wheeze.  She states that she applies a cream to chest to help her at night.  She has appointment with PCP tomorrow and will mention her inability to pay for medications.  She is also encouraged to contact pharmacist for any possible charitable offsets to obtaining medications.  Reviewed signs and symptoms to call back for with patient.  Care advice is given.    Sana Schmitz RN, Triage      Reason for Disposition    Caller has NON-URGENT medication question about med that PCP prescribed and triager unable to answer question    Protocols used: MEDICATION QUESTION CALL-A-

## 2021-06-02 NOTE — PROGRESS NOTES
Programs Applying For: None  County: [facility]  Case #:  PMI #: [id number]      Outreach:   11/1/19: FRW reviewed notes, pt is trying to get prescriptions from pharmacy but since she has a secondary insurance listed ([employer]) they can't bill [place]. FRW called pt to see if she still has this secondary insurance and left VM. FRW will call again early next week. FYI- the pharmacy called on 11/1/19 to get a prior auth for the prescription. (If pt does not have secondary insurance, she will need to update [facility] Mnsure to fix interface issue. They may request a notice that this was cancelled)     Health Insurance Information:   [place]: Medical Assistance.  Elig Type PX: Pregnant woman  Eligibility Begin Date: 09/01/2019  Eligibility End Date: 04/30/2020    MA12 - Prepaid Medical Assistance Program (PMAP) delivered through [email or web address].    INSURANCE COVERAGE  Carrier ID: [id number]  Coverage Types: 05: Pharmacy - Copay per prescription (Provider must bill insurance first.)  Carrier Name:  [employer]    Referral:   She needs help with removing a [place] as it is preventing claims from processing correctly.

## 2021-06-02 NOTE — PROGRESS NOTES
The Clinic Community Health Worker spoke with the patient today to discuss possible Clinic Care Coordination enrollment.  The service was described to the patient and immediate needs were discussed.  The patient agreed to enrollment and an assessment was scheduled.  The patient was provided with contact information for the clinic CHW.             Assessment date: 11/11/2019

## 2021-06-02 NOTE — PROGRESS NOTES
OFFICE VISIT NOTE      Assessment & Plan   Fan Say Renee is a 24 y.o. female with asthma - having trouble figuring out her insurance and thus which inhalers she has. Will ask the financial  to help with her insurance ASAP so she can get the inhalers she needs. Addendum 11/1 - I called [facility] and they were able to run the inhalers through and get them covered: Advair and albuterol. They agreed to call El[facility] and let her know the inhalers were available.    Initially she wanted to use the patch, but due to complications with starting it and possible side effects, she changed her mind and wanted to do depo, just once.  Urine pregnancy test is still positive, likely because her HCG continues to drop. Still gave depo since the test is likely positive due to the residual HCG she has, not from pregnancy.    Vaccine updates given.    Diagnoses and all orders for this visit:    Cough variant asthma  -     fluticasone propion-salmeterol (ADVAIR HFA) 45-21 mcg/actuation inhaler; Inhale 2 puffs 2 (two) times a day.  Dispense: 1 Inhaler; Refill: 12    Need for tetanus booster  -     Td, Preservative Free (green label)    Need for HPV vaccine  -     HPV vaccine 9 valent 3 dose IM        Gloria Berg MD    The following high BMI interventions were performed this visit: encouragement to exercise          Subjective:   Chief Complaint:  Depo Shot    24 y.o. female.     1) having nighttime cough which is pretty bad. She wants an inhaler but the one I ordered is $400. Calling the pharmacy with the patient, I found out the problem is that she has a secondary insurance listed but she has no proof of it (it would have been from [place]). Thus, the pharmacy asks her to pay out of pocket.  inahler is $400    2) wants patch birth control to use a few months, then plans to try for pregnancy again.      Current Outpatient Medications   Medication Sig     acetaminophen (TYLENOL) 500 MG tablet Take 1,000 mg by  "mouth.     albuterol (PROAIR HFA;PROVENTIL HFA;VENTOLIN HFA) 90 mcg/actuation inhaler Inhale 2 puffs every 6 (six) hours as needed for wheezing.     beclomethasone (QVAR) 40 mcg/actuation inhaler Inhale 2 puffs 2 (two) times a day.     cholecalciferol, vitamin D3, 5,000 unit capsule Take 1 capsule (5,000 Units total) by mouth daily.     fluticasone propion-salmeterol (ADVAIR HFA) 45-21 mcg/actuation inhaler Inhale 2 puffs 2 (two) times a day.     fluticasone propionate (FLOVENT HFA) 44 mcg/actuation inhaler Inhale 2 puffs 2 (two) times a day. Use with spacer; rinse mouth after dose     prenatal vit-iron fum-folic ac (PRENATAL VITAMIN) 27 mg iron- 0.8 mg Tab tablet Take 1 tablet by mouth daily.     senna-docusate (PERICOLACE) 8.6-50 mg tablet Take 2 tablets by mouth daily as needed for constipation.       PSFHx: appropriate sections of PMH completed/filled in   Tobacco Status:  She  reports that she has never smoked. She has never used smokeless tobacco.    Review of Systems:  No fever.  No rash. All other systems negative except as noted above.    Objective:    /64   Pulse 72   Temp 98.3  F (36.8  C) (Oral)   Resp 20   Ht 4' 11\" (1.499 m)   Wt 150 lb 12 oz (68.4 kg)   LMP 10/01/2019 (Approximate)   BMI 30.45 kg/m    GENERAL: No acute distress.  Ht: reg s1s2  Lungs: clear    Reviewed labs together    Spent 25 min face to face with patient with more the 50% spent in counseling, education and coordination of care and discussing contraceptive mgmt, weight and exercise, pre-conception care.    "

## 2021-06-02 NOTE — PROGRESS NOTES
OFFICE VISIT NOTE      Assessment & Plan   Fan Say Renee is a 24 y.o. female who recently miscarried and is still recovering. Once her HCG level is back, if it is quite low (under 500), we'll move ahead with letting her get a depo shot. If the HCG seems high, we might pursue a pelvic u/s.    She's noticed with the cool weather that her asthma is worse. She's using the albuterol at least daily. She did not realize it will get lots colder yet. I'll start her on Flovent 44 to help her manage the asthma better. She agrees to see the clinical pharmacist for help with the inhalers. She'll also get a flu shot today.    Constipation - manage with stool softner, more fluids and more vegetables.      Diagnoses and all orders for this visit:    Cough variant asthma  -     Nebulizer  -     albuterol nebulizer solution 3 mL (PROVENTIL)    Need for immunization against influenza  -     Influenza,Seasonal,Quad,INJ =/>6months    Miscarriage  -     Beta-hCG, Quantitative  -     HM1(CBC and Differential)  -     HM1 (CBC with Diff)  -     Pregnancy, Urine    Drug-induced constipation  -     senna-docusate (PERICOLACE) 8.6-50 mg tablet; Take 2 tablets by mouth daily as needed for constipation.  Dispense: 60 tablet; Refill: 5    Surveillance of previously prescribed intrauterine contraceptive device    Mild intermittent asthma with exacerbation  -     albuterol (PROAIR HFA;PROVENTIL HFA;VENTOLIN HFA) 90 mcg/actuation inhaler; Inhale 2 puffs every 6 (six) hours as needed for wheezing.  Dispense: 2 each; Refill: 11  -     Tubular Spacer  -     fluticasone propionate (FLOVENT HFA) 44 mcg/actuation inhaler; Inhale 2 puffs 2 (two) times a day. Use with spacer; rinse mouth after dose  Dispense: 1 Inhaler; Refill: 5    Encounter for prescription for progestin-only injected contraceptive  -     medroxyPROGESTERone injection 150 mg (DEPO-PROVERA)    Need for HPV vaccine  -     HPV vaccine 9 valent 3 dose IM; Future; Expected date:  "10/16/2019    Need for tetanus booster  -     Td, Preservative Free (green label); Future; Expected date: 10/16/2019        Gloria Berg MD              Subjective:   Chief Complaint:  Follow-up    24 y.o. female.     1) with cool weather, breathing is worse  This is usual for her - that cold air makes her breathing difficult  She's using albuterol every 2 hrs up to 2 days ago when she ran out    2) she's decided to get a depo shot and then when it runs out, she'll start trying for another pregnancy.  3) she's taken some stool softner but it is not quite enough; she wants something a little stronger    Current Outpatient Medications   Medication Sig     acetaminophen (TYLENOL) 500 MG tablet Take 1,000 mg by mouth.     albuterol (PROAIR HFA;PROVENTIL HFA;VENTOLIN HFA) 90 mcg/actuation inhaler Inhale 2 puffs every 6 (six) hours as needed for wheezing.     cholecalciferol, vitamin D3, 5,000 unit capsule Take 1 capsule (5,000 Units total) by mouth daily.     fluticasone propionate (FLOVENT HFA) 44 mcg/actuation inhaler Inhale 2 puffs 2 (two) times a day. Use with spacer; rinse mouth after dose     prenatal vit-iron fum-folic ac (PRENATAL VITAMIN) 27 mg iron- 0.8 mg Tab tablet Take 1 tablet by mouth daily.     senna-docusate (PERICOLACE) 8.6-50 mg tablet Take 2 tablets by mouth daily as needed for constipation.       PSFHx: appropriate sections of PMH completed/filled in   Tobacco Status:  She  reports that she has never smoked. She has never used smokeless tobacco.    Review of Systems:  No fever.  No rash. All other systems negative except as noted above.    Objective:    BP 96/62 (Patient Site: Left Arm, Patient Position: Sitting, Cuff Size: Adult Regular)   Pulse 72   Temp 99.2  F (37.3  C) (Oral)   Resp 16   Ht 4' 11\" (1.499 m)   Wt 149 lb 8 oz (67.8 kg)   LMP 09/16/2019 (Approximate) Comment: Had a miscarriage and contiued to bleed from 9/15-10/11  BMI 30.20 kg/m    GENERAL: No acute distress.  Mood: " good  Insight: good  Judgment: good  Affect: appropriate    Ht: reg s1s2  Lungs: aeration fair, slightly prolonged expiration, no wheezes or rales; after neb, aeration good, no wheezes or rales      Spent 40 min face to face with patient with more the 50% spent in counseling, education and coordination of care and discussing asthma, use of inhalers and nebulizer, expectations of cold, constipation, immunizations, contraception, miscarriage.

## 2021-06-02 NOTE — TELEPHONE ENCOUNTER
Patient Returning Call  Reason for call:  Return call  Information relayed to patient:  Patient was informed of her result and the message below. Patient has no further questions or concerns at this time. Patient was transferred to scheduling with a Penelope  (Eduin #[id number]).  Patient has additional questions:  No  If YES, what are your questions/concerns:  n/a  Okay to leave a detailed message?: No call back needed       FYI:  Penelope  was used during this conversation and transferred to scheduling for further assistance.  name: Eduin #[id number].

## 2021-06-02 NOTE — TELEPHONE ENCOUNTER
Prior Authorization Request  Who s requesting:  Pharmacy  Pharmacy Name and Location: [id number]  Medication Name: Flovent  Insurance Plan: [place] [employer] Medicaid   Insurance Member ID Number:  [id number]  Informed patient that prior authorizations can take up to 10 business days for response:   No  Okay to leave a detailed message: No

## 2021-06-02 NOTE — TELEPHONE ENCOUNTER
Prior Authorization Request  Who s requesting:  Pharmacy  Pharmacy Name and Location: [facility]  Medication Name:   beclomethasone (QVAR) 40 mcg/actuation inhaler 1 Inhaler 5 10/16/2019  --   Sig - Route: Inhale 2 puffs 2 (two) times a day. - Inhalation       Insurance Plan: [place] Medicaid   Insurance Member ID Number:  [id number]  Informed patient that prior authorizations can take up to 10 business days for response:   No  Okay to leave a detailed message: Yes

## 2021-06-02 NOTE — TELEPHONE ENCOUNTER
Called pt but left message to call back.  When she does, ok to relay lab results and schedule nurse only for Depo, HPV #1, and TD.  Thanks.

## 2021-06-02 NOTE — TELEPHONE ENCOUNTER
Spoke with Dr. Guajardo. She states to send in Qvar 2 puffs two times a day, spoke with pharmacist he states that Qvar 40 would be most equivalent to what was sent earlier.  Sent in new Rx  Informed patient with Penelope  [id number], patient stated understanding and will check with her pharmacy lisa.  Andreina Flores, RN  Care Connection Triage Nurse  7:46 PM  10/16/2019

## 2021-06-03 VITALS
SYSTOLIC BLOOD PRESSURE: 98 MMHG | HEART RATE: 84 BPM | HEIGHT: 59 IN | RESPIRATION RATE: 20 BRPM | BODY MASS INDEX: 29.84 KG/M2 | DIASTOLIC BLOOD PRESSURE: 54 MMHG | WEIGHT: 148 LBS | TEMPERATURE: 99.2 F

## 2021-06-03 VITALS
HEIGHT: 59 IN | BODY MASS INDEX: 30.39 KG/M2 | TEMPERATURE: 98.3 F | WEIGHT: 150.75 LBS | RESPIRATION RATE: 20 BRPM | HEART RATE: 72 BPM | SYSTOLIC BLOOD PRESSURE: 100 MMHG | DIASTOLIC BLOOD PRESSURE: 64 MMHG

## 2021-06-03 VITALS
TEMPERATURE: 99.2 F | RESPIRATION RATE: 16 BRPM | SYSTOLIC BLOOD PRESSURE: 96 MMHG | BODY MASS INDEX: 30.14 KG/M2 | HEIGHT: 59 IN | HEART RATE: 72 BPM | DIASTOLIC BLOOD PRESSURE: 62 MMHG | WEIGHT: 149.5 LBS

## 2021-06-03 NOTE — PROGRESS NOTES
Programs Applying For: None  County: [facility]  Case #:  PMI #: [id number]      Outreach:   11/13/2019: FRW called patient to [facility] to report no secondary health insurance. Patient stated she now received her Blue Plus Card in mail. Patients insurance in Epic is Medicaid with no bills due at this time. FRW called the County and there is an interface issue with Health Insurance coverage through [place]. FRW will check in 3 weeks to make sure claims are being processed correctly.  Note from CHW: I spoke with patient and reschedule her appointment with CCC FRANCESCA. I saw your note and ask her if she has secondary health insurance. Patient report she do not have secondary health insurance and the only insurance she [place] is [place].  [place] health insurance end back in August.  I read the name of insurance to patient and she doesn't recognize it.   Patient indicate whe will [facility].  Can you or Rose assist this [facility] and if need patient to be on the call, can also do conference call.  Thanks.   11/1/19: FRW reviewed notes, pt is trying to get prescriptions from pharmacy but since she has a secondary insurance listed ([employer]) they can't bill [place]. FRW called pt to see if she still has this secondary insurance and left VM. FRW will call again early next week. FYI- the pharmacy called on 11/1/19 to get a prior auth for the prescription. (If pt does not have secondary insurance, she will need to update [facility] to fix interface issue. They may request a notice that this was cancelled)     Health Insurance Information:   [place]: Medical Assistance.  Elig Type PX: Pregnant woman  Eligibility Begin Date: 09/01/2019  Eligibility End Date: 04/30/2020    MA12 - Prepaid Medical Assistance Program (PMAP) delivered through [employer].    INSURANCE COVERAGE  Carrier ID: [id number]  Coverage Types: 05: Pharmacy - Copay per prescription (Provider must bill insurance  first.)  Carrier Name:  [employer]    Referral:   She needs help with removing a secondary insurance that is [place] in [place] as it is preventing claims from processing correctly.

## 2021-06-03 NOTE — TELEPHONE ENCOUNTER
The patient should have 1) albuterol inhaler, and 2) either Advair or Qvar - whichever we can get insurance to cover sooner. She really needs this and insurance has been a PAIN.

## 2021-06-03 NOTE — TELEPHONE ENCOUNTER
Per the patient's chart, the Qvar Redihaler along with the Flovent HFA. The patient has an active order and a PA pending for the Advair HFA.     The pharmacy is wondering which inhaler that the patient should be on and was wondering if they could have a call back in regards to which therapy is the correct one? [id number] - [place], [place] - [address] AT [facility] [address] : [phone].    Should the Prior Authorization request for the Qvar Redihaler be disregarded?

## 2021-06-03 NOTE — PROGRESS NOTES
Clinic Care Coordination Contact     Pt was a no show for initial CCC SW assessment x1. Offer to reschedule at next outreach if needed.    Please see note in chart from FRW. As of 11/13/19, pt received her insurance card in the mail. FRW checking status in 2 weeks to make sure claims are being processed correctly. CCC assessment not needed to follow up on insurance concerns unless pt expresses additional issues.

## 2021-06-03 NOTE — PROGRESS NOTES
MTM Initial Encounter  Assessment & Plan                                                     1. Asthma, unspecified asthma severity, unspecified whether complicated, unspecified whether persistent  Needs improvement.  Patient's asthma is currently not well controlled, having symptoms mainly at night.  Patient presents with both Flovent and Advair, representing duplicative therapy but she has not started either of them.  Patient in need of inhaler administration education, pharmacist discussed today with patient.  Additionally, educated patient on the difference between controller and rescue inhalers and appropriate dosing of each.  Recommended patient  refill of albuterol. Recommended patient use Flovent to start and keep Advair on hand, but do not use.  Recommended patient rinse mouth after use of steroid inhaler.  - Pharmacist provided education on inhalers and appropriate use  - Patient to  Ventolin  - START - fluticasone propionate (FLOVENT HFA) 44 mcg/actuation inhaler; Inhale 2 puffs 2 (two) times a day.  Dispense: 1 Inhaler; Refill: 12  - Patient to keep Advair inhaler but do NOT use - duplicate therapy with flovent    Follow Up  Return in about 23 days (around 12/5/2019) for Medication Review.    Subjective & Objective                                                     Fan Duran is a 24 y.o. female coming in for an initial visit for Medication Therapy Management. She was referred to me from Gloria Berg MD. Patient presents with professional Penelope .    Chief Complaint: MTM initial visit    Medication Adherence/Access: Patient needs help with inhaler administration     Asthma: Patient brings in Advair HFA 2 puffs twice daily, and flovent 44 mcg 2 puffs two times a day (has NOT started either of these). Does NOT bring in albuterol inhaler 2 puffs every 6 hours as needed (has not picked up refill). States that last month she used albuterol only and ran out very  "quickly, states that she felt like it wasn't working for her. Feeling asthma mostly at night making it hard to breath. Patient states that when she tried using the inhaler before, she did not know to remove the cap and didn't get any of the medication.   States that last month she used the albuterol inhaler for 1 month and she \"did not feel any better\", instead she has been using icyhot on her chest to help her breathing.   Patient demonstrates use of inhaler inappropriately, this area needs some work. After teaching, patient able to effectively demonstrate inhaler technique.   ACT Total Score: (!) 8 (10/29/2019  1:44 PM)  [phone]    PMH: reviewed in EPIC   Allergies/ADRs: reviewed in EPIC   Alcohol: unable to discuss today   Tobacco:   Social History     Tobacco Use   Smoking Status Never Smoker   Smokeless Tobacco Never Used     Today's Vitals: There were no vitals filed for this visit.  ----------------    The patient declined an after visit summary    I spent 60 minutes with this patient today.   All changes were made via collaborative practice agreement with Gloria Berg MD. A copy of the visit note was provided to the patient's provider.     Angela Varela, PharmD  Medication Therapy Management Pharmacist  [facility]       Current Outpatient Medications   Medication Sig Dispense Refill     acetaminophen (TYLENOL) 500 MG tablet Take 1,000 mg by mouth.       albuterol (PROAIR HFA;PROVENTIL HFA;VENTOLIN HFA) 90 mcg/actuation inhaler Inhale 2 puffs every 6 (six) hours as needed for wheezing. 2 each 11     cholecalciferol, vitamin D3, 5,000 unit capsule Take 1 capsule (5,000 Units total) by mouth daily. 90 capsule 4     fluticasone propionate (FLOVENT HFA) 44 mcg/actuation inhaler Inhale 2 puffs 2 (two) times a day. 1 Inhaler 12     prenatal vit-iron fum-folic ac (PRENATAL VITAMIN) 27 mg iron- 0.8 mg Tab tablet Take 1 tablet by mouth daily. 90 tablet 3     senna-docusate " (PERICOLACE) 8.6-50 mg tablet Take 2 tablets by mouth daily as needed for constipation. 60 tablet 5     Current Facility-Administered Medications   Medication Dose Route Frequency Provider Last Rate Last Dose     medroxyPROGESTERone injection 150 mg (DEPO-PROVERA)  150 mg Intramuscular Q3 Months Gloria Berg MD   150 mg at 10/29/19 1504     medroxyPROGESTERone injection 150 mg (DEPO-PROVERA)  150 mg Intramuscular Once Gloria Berg MD

## 2021-06-03 NOTE — TELEPHONE ENCOUNTER
Per note below, PA for Advair already pending. Can provider advise regarding which inhaler (or both) patient should be using.

## 2021-06-03 NOTE — TELEPHONE ENCOUNTER
Central PA team  [phone]  Pool: HE PA MED ([id number])          PA has been initiated.       PA form completed and faxed insurance via Cover My Meds     Key:  [id number]     Medication:  Flovent HFA 44MCG/ACT aerosol      Insurance:  [place] Medicaid        Response will be received via fax and may take up to 5-10 business days depending on plan

## 2021-06-03 NOTE — TELEPHONE ENCOUNTER
Pt was a no show for SW visit today. I just want to ensure someone is following up.    FRW- are you able to call [facility] with this pt?

## 2021-06-03 NOTE — TELEPHONE ENCOUNTER
PRIOR AUTHORIZATION NOT NEEDED      I called the pharmacy to verify the patient's insurance to submit the PA for the Qvar and they did close out that order. However, the Advair HFA Rx that was sent, did process through the patient's insurance without needing a PA.

## 2021-06-03 NOTE — PROGRESS NOTES
CHW called patient to reschedule no show appointment today and patient stated she is no longer needing to see CCC SW because health insurance has resolved.

## 2021-06-03 NOTE — PROGRESS NOTES
OFFICE VISIT NOTE      Assessment & Plan   Farzadyolandefeliz Duran is a 24 y.o. female who continues to recover from a miscarriage. She's on depo to help her space pregnancies and she's on Prenatal vits. I encouraged her to invest in her own health now to help her body to be fit for the next pregnancy.    Eye itch - avoid dust, drink enough water, use Refresh or Systane prn    No depression; discussed seeking help if needed due to difficult thoughts or   She admits to getting angry quickly and also thinking with any marital problem that they need a divorce. She knows this is hard on her  and he's been good to put up with her. Exercise, write, draw to let emotions out. Discussed the option of seeing a counselor.    Has [place] immunization record at home - will try to remember to bring it in when she comes for the HPV in Dec    Diagnoses and all orders for this visit:    Encounter for medical examination to establish care    Allergic conjunctivitis, bilateral  -     ketotifen (ZADITOR/ZYRTEC ITCHY EYES) 0.025 % (0.035 %) ophthalmic solution; 1 drop in each eye four times a day prn itch  Dispense: 5 mL; Refill: 2    Dry eyes  -     carboxymethylcellulose (REFRESH LIQUIGEL) 1 % ophthalmic solution; Use in dry/itchy eyes every hour as needed  Dispense: 30 mL; Refill: 12    Need for HPV vaccination  -     HPV vaccine 9 valent 3 dose IM; Future; Expected date: 12/03/2019    Miscarriage  -     Beta-hCG, Quantitative; Future; Expected date: 12/03/2019    Overweight        Gloria Berg MD    The following high BMI interventions were performed this visit: encouragement to exercise, exercise promotion: stretching and exercise promotion: walking/step counting          Subjective:   Chief Complaint:  Establish  Care (Concern about Dizziness. Eyes  hurt.)    24 y.o. female.     1) eye pain - actually redness, itching, swelling with sneezing at times; worst when she cleans the house  She's used eye drops from an eye clinic  "- when she went for vision problems    2) dizziness - tired at times, beryl when she stands up quickly but also sometimes while sitting down  She admits she rarely drinks water. He mom tries to remind her to drink. She admits her urine is very dark yellow. When she drinks lots of water, she feels bloated.  Eating well, no problems  Sleeping well and lots    3) wants to lose weight - not exercising at all - she used to get tired and have shortness of breath without an inhaler. Now she has her inhaler.  Her habit is to eat and then lie down.    4) no menses since depo - is that OK?    Current Outpatient Medications   Medication Sig     acetaminophen (TYLENOL) 500 MG tablet Take 1,000 mg by mouth.     albuterol (PROAIR HFA;PROVENTIL HFA;VENTOLIN HFA) 90 mcg/actuation inhaler Inhale 2 puffs every 6 (six) hours as needed for wheezing.     cholecalciferol, vitamin D3, 5,000 unit capsule Take 1 capsule (5,000 Units total) by mouth daily.     fluticasone propionate (FLOVENT HFA) 44 mcg/actuation inhaler Inhale 2 puffs 2 (two) times a day.     prenatal vit-iron fum-folic ac (PRENATAL VITAMIN) 27 mg iron- 0.8 mg Tab tablet Take 1 tablet by mouth daily.     senna-docusate (PERICOLACE) 8.6-50 mg tablet Take 2 tablets by mouth daily as needed for constipation.     carboxymethylcellulose (REFRESH LIQUIGEL) 1 % ophthalmic solution Use in dry/itchy eyes every hour as needed     ketotifen (ZADITOR/ZYRTEC ITCHY EYES) 0.025 % (0.035 %) ophthalmic solution 1 drop in each eye four times a day prn itch       PSFHx: appropriate sections of PMH completed/filled in   Tobacco Status:  She  reports that she has never smoked. She has never used smokeless tobacco.    Review of Systems:  No fever.  No rash. All other systems negative except as noted above.    Objective:    /64   Pulse 89   Temp 97.6  F (36.4  C) (Oral)   Resp 20   Ht 4' 11.37\" (1.508 m)   Wt 152 lb 8 oz (69.2 kg)   LMP 10/05/2019   SpO2 98%   BMI 30.42 kg/m  "   GENERAL: No acute distress.  HEENT: PERRL, EOMI, no erythema of the conjunctiva; throat clear, teeth clean  Neck: supple, no LA  Ht: reg s1s2  Lungs: clear with good aeration    Reviewed past labs  Reviewed meds    Spent 40 min face to face with patient with more the 50% spent in counseling, education and coordination of care and discussing weight, miscarriage recovery, emotional support/needs, meds, eye care, hydration, exercise, coping.

## 2021-06-04 VITALS
WEIGHT: 150.5 LBS | RESPIRATION RATE: 18 BRPM | HEIGHT: 59 IN | SYSTOLIC BLOOD PRESSURE: 100 MMHG | TEMPERATURE: 98.1 F | BODY MASS INDEX: 30.34 KG/M2 | OXYGEN SATURATION: 98 % | HEART RATE: 92 BPM | DIASTOLIC BLOOD PRESSURE: 60 MMHG

## 2021-06-04 VITALS
WEIGHT: 154.75 LBS | RESPIRATION RATE: 20 BRPM | BODY MASS INDEX: 31.26 KG/M2 | TEMPERATURE: 99 F | OXYGEN SATURATION: 98 % | SYSTOLIC BLOOD PRESSURE: 104 MMHG | HEART RATE: 85 BPM | DIASTOLIC BLOOD PRESSURE: 64 MMHG

## 2021-06-04 VITALS
TEMPERATURE: 97.6 F | SYSTOLIC BLOOD PRESSURE: 118 MMHG | RESPIRATION RATE: 20 BRPM | OXYGEN SATURATION: 98 % | HEART RATE: 89 BPM | HEIGHT: 59 IN | WEIGHT: 152.5 LBS | BODY MASS INDEX: 30.74 KG/M2 | DIASTOLIC BLOOD PRESSURE: 64 MMHG

## 2021-06-04 VITALS
OXYGEN SATURATION: 98 % | TEMPERATURE: 98.8 F | DIASTOLIC BLOOD PRESSURE: 64 MMHG | SYSTOLIC BLOOD PRESSURE: 104 MMHG | BODY MASS INDEX: 30.95 KG/M2 | HEIGHT: 59 IN | WEIGHT: 153.5 LBS | HEART RATE: 89 BPM | RESPIRATION RATE: 16 BRPM

## 2021-06-04 VITALS — WEIGHT: 155.5 LBS | BODY MASS INDEX: 31.41 KG/M2

## 2021-06-04 NOTE — TELEPHONE ENCOUNTER
"Please call Fan and let her know her lab results.  Her HCG test is <2 which is terrific. This proves her miscarriage completely ended, which is what we had hoped would be true.  Her A1-c is 5.7, which is increased from 5.5. Once she's at 5.8, we would consider her to have \"prediabetes\" --so I Hope she can make some lifestyle changes with diet and exercise to decrease her overall blood sugar. Does she want a referral to learn more? (if so, route this back to me).  "

## 2021-06-04 NOTE — PROGRESS NOTES
"  Chief Complaint   Patient presents with     Abdominal Pain     after miscarriage (9/16/19) X 1 week          HPI:   Fan Duran is a 24 y.o. female with  in for pelvic pain.  Had miscarriage in September. Had some cramping in October with a little bleeding.  On December 24, had a lot of abdominal cramping, then had bleeding with passing a \"ball\"  No more bleeding, but still having mild cramping.    No period after miscarriage in September.  Started depo shot in October.    No fever.  No nausea, vomiting. No diarrhea.  Eating OK.  Mild constipation.  No pain with urination.    No vaginal discharge.      ROS:  A 10 point comprehensive review of systems was negative except as noted.     Medications:  Current Outpatient Medications on File Prior to Visit   Medication Sig Dispense Refill     acetaminophen (TYLENOL) 500 MG tablet Take 1,000 mg by mouth.       albuterol (PROAIR HFA;PROVENTIL HFA;VENTOLIN HFA) 90 mcg/actuation inhaler Inhale 2 puffs every 6 (six) hours as needed for wheezing. 2 each 11     carboxymethylcellulose (REFRESH LIQUIGEL) 1 % ophthalmic solution Use in dry/itchy eyes every hour as needed 30 mL 12     cholecalciferol, vitamin D3, 5,000 unit capsule Take 1 capsule (5,000 Units total) by mouth daily. 90 capsule 4     fluticasone propionate (FLOVENT HFA) 44 mcg/actuation inhaler Inhale 2 puffs 2 (two) times a day. 1 Inhaler 12     prenatal vit-iron fum-folic ac (PRENATAL VITAMIN) 27 mg iron- 0.8 mg Tab tablet Take 1 tablet by mouth daily. 90 tablet 3     senna-docusate (PERICOLACE) 8.6-50 mg tablet Take 2 tablets by mouth daily as needed for constipation. 60 tablet 5     ketotifen (ZADITOR/ZYRTEC ITCHY EYES) 0.025 % (0.035 %) ophthalmic solution 1 drop in each eye four times a day prn itch (Patient not taking: Reported on 1/3/2020) 5 mL 2     Current Facility-Administered Medications on File Prior to Visit   Medication Dose Route Frequency Provider Last Rate Last Dose     " "medroxyPROGESTERone injection 150 mg (DEPO-PROVERA)  150 mg Intramuscular Q3 Months Gloria Berg MD   150 mg at 10/29/19 1504     medroxyPROGESTERone injection 150 mg (DEPO-PROVERA)  150 mg Intramuscular Once Gloria Berg MD             Social History:  Social History     Tobacco Use     Smoking status: Never Smoker     Smokeless tobacco: Never Used     Tobacco comment: passive smoker - parents smoked   Substance Use Topics     Alcohol use: Never     Frequency: Never         Physical Exam:   Vitals:    20 1449   BP: 100/60   Pulse: 92   Resp: 18   Temp: 98.1  F (36.7  C)   TempSrc: Oral   SpO2: 98%   Weight: 150 lb 8 oz (68.3 kg)   Height: 4' 11.37\" (1.508 m)       GEN:  NAD  LUNGS:  Clear to auscultation without wheezing.  Normal effort.  HEART:  RRR without murmur, rub or gallop   ABDOMEN: Normal BS,  Soft and nontender,  No masses.  No CVA tenderness.  PELVIC:    External genitalia: Normal  Vagina: Small amount white discharge.  Cervix: No lesions. No cervical motion tenderness  Uterus:  Small, nontender.  Adnexae:  Without masses or tenderness.     LABS:  Results for orders placed or performed in visit on 20   Pregnancy (Beta-hCG, Qual), Urine   Result Value Ref Range    Pregnancy Test, Urine Negative Negative        Assessment/Plan:    1. Pelvic pain in female  Pregnancy (Beta-hCG, Qual), Urine    US Pelvis With Transvaginal Non OB   2. Cervical cancer screening  Gynecologic Cytology (PAP Smear)   3. Special screening examination for chlamydial disease  Chlamydia trachomatis & Neisseria gonorrhoeae, Amplified Detection        Possible passage of remains of incomplete .  Pregnancy test negative today.  Only occasional abdominal cramping and no bleeding.  Quantitative B-hCG was <2 yesterday  Will obtain pelvic US    Obtained pap smear and chlamydial screening today.          Analisa Hodge MD      1/3/2020    The following portions of the patient's history were reviewed " and updated as appropriate: allergies, current medications, past family history, past medical history, past social history, past surgical history and problem list.

## 2021-06-04 NOTE — PROGRESS NOTES
Programs Applying For: None  County: [facility]  Case #:  PMI #: [id number]      Outreach:   12/30/2019: FRW checked [place], patients interface issue has resolved and claims are processed with no bill. FRW called patient to confirm issues have been resolved. No further FRW needs, taking patient off panel.  CLOSED ENCOUNTER:  11/13/2019: FRW called patient to call [facility] together to report no secondary health insurance. Patient stated she now received her Blue Plus Card in mail. Patients insurance in Epic is Medicaid with no bills due at this time. FRW called the County and there is an interface issue with Health Insurance coverage through [place]. FRW will check in 3 weeks to make sure claims are being processed correctly.  Note from CHW: I spoke with patient and reschedule her appointment with CCC FRANCESCA. I saw your note and ask her if she has secondary health insurance. Patient report she do not have secondary health insurance and the only insurance she has is [place].  Her [place] health insurance end back in August.  I read the name of insurance to patient and she doesn't recognize it.   Patient indicate whe will need help [facility].  Can you or Rose assist this patient [facility] and if need patient to be on the call, can also do conference call.  Thanks.   11/1/19: FRW reviewed notes, pt is trying to get prescriptions from pharmacy but since she has a secondary insurance listed ([employer]) they can't bill [place]. FRW called pt to see if she still has this secondary insurance and left VM. FRW will call again early next week. FYI- the pharmacy called on 11/1/19 to get a prior auth for the prescription. (If pt does not have secondary insurance, she will need to update [facility] to fix interface issue. They may request a notice that this was cancelled)     Health Insurance Information:   [place]: Medical Assistance.  Elig Type PX: Pregnant woman  Eligibility Begin Date:  09/01/2019  Eligibility End Date: 04/30/2020    MA12 - Prepaid Medical Assistance Program (PMAP) delivered through [employer].    INSURANCE COVERAGE  Carrier ID: [id number]  Coverage Types: 05: Pharmacy - Copay per prescription (Provider must bill insurance first.)  Carrier Name:  [employer]    Referral:   She needs help with removing a secondary insurance that is listed in [place] as it is preventing claims from processing correctly.

## 2021-06-05 VITALS
TEMPERATURE: 97.9 F | DIASTOLIC BLOOD PRESSURE: 70 MMHG | HEART RATE: 82 BPM | SYSTOLIC BLOOD PRESSURE: 108 MMHG | RESPIRATION RATE: 18 BRPM | BODY MASS INDEX: 32.47 KG/M2 | WEIGHT: 160.75 LBS | OXYGEN SATURATION: 98 %

## 2021-06-05 VITALS
SYSTOLIC BLOOD PRESSURE: 100 MMHG | OXYGEN SATURATION: 97 % | HEART RATE: 92 BPM | BODY MASS INDEX: 31.9 KG/M2 | WEIGHT: 158.25 LBS | TEMPERATURE: 98 F | HEIGHT: 59 IN | DIASTOLIC BLOOD PRESSURE: 68 MMHG | RESPIRATION RATE: 16 BRPM

## 2021-06-05 VITALS
TEMPERATURE: 98.5 F | BODY MASS INDEX: 32.56 KG/M2 | HEIGHT: 59 IN | RESPIRATION RATE: 16 BRPM | WEIGHT: 161.5 LBS | DIASTOLIC BLOOD PRESSURE: 72 MMHG | OXYGEN SATURATION: 97 % | SYSTOLIC BLOOD PRESSURE: 90 MMHG | HEART RATE: 86 BPM

## 2021-06-05 NOTE — PROGRESS NOTES
OFFICE VISIT NOTE      Assessment & Plan   Fan Duran is a 25 y.o. female with persistent headaches, vision problems, and sleep problems/snoring. Will investigate these:      Diagnoses and all orders for this visit:    Episodic tension-type headache, not intractable  -     Ambulatory referral to Optometry  -     HM1(CBC and Differential)  -     Comprehensive Metabolic Panel  -     Thyroid Stimulating Hormone (TSH)  -     Ambulatory referral to Sleep Medicine  -     HM1 (CBC with Diff)    Blurred vision  -     Ambulatory referral to Optometry  -     HM1(CBC and Differential)  -     Comprehensive Metabolic Panel  -     Thyroid Stimulating Hormone (TSH)  -     HM1 (CBC with Diff)    Snoring  -     HM1(CBC and Differential)  -     Comprehensive Metabolic Panel  -     Thyroid Stimulating Hormone (TSH)  -     Ambulatory referral to Sleep Medicine  -     1 (CBC with Diff)    Sleep apnea, unspecified type  -     HM1(CBC and Differential)  -     Comprehensive Metabolic Panel  -     Thyroid Stimulating Hormone (TSH)  -     Ambulatory referral to Sleep Medicine  -     [facility] (CBC with Diff)        Gloria Berg MD    The following high BMI interventions were performed this visit: encouragement to exercise, exercise promotion: strength training, exercise promotion: stretching and exercise promotion: walking/step counting          Subjective:   Chief Complaint:  Headache    25 y.o. female.     1) wonders if she has high BP  Headache comes and goes  Has blurry vision during headache  For headache treatment - she just waits until it goes away; lemonade helps.  Does not miss work because of the headache    2) feels tension when her  drinks  There is no threat of danger, he just likes to be close to her when he drinks. If she says no, he does not respect that. He also talks too much when he drinks.  Sometimes the headache just comes on it's own    3) has nightmares, more now - not meaning bad story    Current  "Outpatient Medications   Medication Sig     acetaminophen (TYLENOL) 500 MG tablet Take 1,000 mg by mouth.     albuterol (PROAIR HFA;PROVENTIL HFA;VENTOLIN HFA) 90 mcg/actuation inhaler Inhale 2 puffs every 6 (six) hours as needed for wheezing.     carboxymethylcellulose (REFRESH LIQUIGEL) 1 % ophthalmic solution Use in dry/itchy eyes every hour as needed     cholecalciferol, vitamin D3, 5,000 unit capsule Take 1 capsule (5,000 Units total) by mouth daily.     fluticasone propionate (FLOVENT HFA) 44 mcg/actuation inhaler Inhale 2 puffs 2 (two) times a day.     ketotifen (ZADITOR/ZYRTEC ITCHY EYES) 0.025 % (0.035 %) ophthalmic solution 1 drop in each eye four times a day prn itch (Patient not taking: Reported on 1/3/2020)     prenatal vit-iron fum-folic ac (PRENATAL VITAMIN) 27 mg iron- 0.8 mg Tab tablet Take 1 tablet by mouth daily.     senna-docusate (PERICOLACE) 8.6-50 mg tablet Take 2 tablets by mouth daily as needed for constipation.       PSFHx: appropriate sections of PMH completed/filled in   Tobacco Status:  She  reports that she has never smoked. She has never used smokeless tobacco.    Review of Systems:  No fever.  No rash. All other systems negative except as noted above.    Objective:    /64   Pulse 89   Temp 98.8  F (37.1  C) (Oral)   Resp 16   Ht 4' 11\" (1.499 m)   Wt 153 lb 8 oz (69.6 kg)   LMP 12/25/2019 (Exact Date)   SpO2 98%   BMI 31.00 kg/m    GENERAL: No acute distress.  HEENT: PERRL, EOMI, TMs clear; throat without erythema  NECK: supple, no LA  Ht: reg s1s2  Lungs: clear    Labs pending    Spent 25 min face to face with patient with more the 50% spent in counseling, education and coordination of care and discussing headaches, vision, sleep, stress, breathing.    "

## 2021-06-05 NOTE — TELEPHONE ENCOUNTER
Provider response below relayed to caller. Message understood. Patient will focus on eating more fruits and vegetables, will avoid excessive helpings of rice and noodles.

## 2021-06-05 NOTE — TELEPHONE ENCOUNTER
Called pt who was not called about today, so scheduled for 9 am on 1/21/20 and requested transportation for apt. Thanks.

## 2021-06-06 NOTE — PROGRESS NOTES
Clinic Care Coordination Contact     Situation: Pt chart reviewed by SW care coordinator.     Background: Health insurance renewal paperwork.     Assessment: Order has been placed by SW care coordinator.     Plan/Recommendations:   1.) Okay for FRW only referral.

## 2021-06-06 NOTE — PROGRESS NOTES
Community Health Worker attempted to reach the patient for a second time and left a message for the patient.  If the patient is returning my call, please transfer the patient to Anabella at ext. [phone].       Patient has been mailed a unreachable letter and was provided with CHW contact information if they are interested in accessing Clinic Care Coordination.      Order for Care Management has been closed, no further outreach will be done at this time and patient can be re-referred.

## 2021-06-06 NOTE — PROGRESS NOTES
"OFFICE VISIT NOTE      Assessment & Plan   Fan Duran is a 25 y.o. female with prediabetes - she really wants to do what she can to control it (she states \"be cured\" which I explained is not how medicine would see it)    Make this a healthy lifestyle for the whole family, not just you. It can help your kids to delay diabetes onset.    RTC in 2-3 mon.    Diagnoses and all orders for this visit:    Prediabetes    Immunization due  -     Cancel: Tdap vaccine,  8yo or older,  IM    Class 1 obesity due to excess calories with serious comorbidity and body mass index (BMI) of 31.0 to 31.9 in adult        Gloria Berg MD              Subjective:   Chief Complaint:  Follow-up (discuss diet and pre-diabetes)    25 y.o. female.     1) does not have a glucometer - was previously offered it but declined it  Learn!  Insurance is currently inactive (per our ); she is unsure when it will become active.  She feels good about what she learned about prediabetes from the educator. She feels motivated.  Is there a medication?  My plan is to eat healthier food and exercise.    2) is thinking of a pregnancy - does she have to wait for that because of the pre-diabetes?  Her  wants more kids. She doesn't.  She'll get the depo in April.    Current Outpatient Medications   Medication Sig     acetaminophen (TYLENOL) 500 MG tablet Take 1,000 mg by mouth.     albuterol (PROAIR HFA;PROVENTIL HFA;VENTOLIN HFA) 90 mcg/actuation inhaler Inhale 2 puffs every 6 (six) hours as needed for wheezing.     carboxymethylcellulose (REFRESH LIQUIGEL) 1 % ophthalmic solution Use in dry/itchy eyes every hour as needed     cholecalciferol, vitamin D3, 5,000 unit capsule Take 1 capsule (5,000 Units total) by mouth daily.     fluticasone propionate (FLOVENT HFA) 44 mcg/actuation inhaler Inhale 2 puffs 2 (two) times a day.     prenatal vit-iron fum-folic ac (PRENATAL VITAMIN) 27 mg iron- 0.8 mg Tab tablet Take 1 tablet by mouth daily. "     senna-docusate (PERICOLACE) 8.6-50 mg tablet Take 2 tablets by mouth daily as needed for constipation.       PSFHx: appropriate sections of PMH completed/filled in   Tobacco Status:  She  reports that she has never smoked. She has never used smokeless tobacco.    Review of Systems:  No fever.  No rash. All other systems negative except as noted above.    Objective:    /64   Pulse 85   Temp 99  F (37.2  C) (Oral)   Resp 20   Wt 154 lb 12 oz (70.2 kg)   SpO2 98%   BMI 31.26 kg/m    GENERAL: No acute distress.  Ht: reg s1s2  Lungs: clear    Reviewed labs    Spent 40 min face to face with patient with more the 50% spent in counseling, education and coordination of care and discussing pre-conception care, prediabetes, obesity, exercise, nutrition, hydration, contraception.

## 2021-06-06 NOTE — PROGRESS NOTES
Clinic Care Coordination Contact  [facility]/Voicemail    Clinical Data: Care Coordinator Outreach    Outreach attempted x 1 regarding CCC enrollment.  Left message on patient's voicemail with call back information and requested return call.    Plan: Care Coordinator will attempt one more outreach to the patient via phone regarding enrollment into CCC.        notified [facility] that this patient wants help with her health insurance. It is due to end this month. CHW sent message to the CCC RN and SW to create a CCC order and to determine if this patient is an FRW Only. CHW will attempt to reach the patient to complete an FRW Screening.        PMI#: [id number]    This subscriber has eligibility for MA: Medical Assistance.  Elig Type AA: Parent of a dependent child  Eligibility Begin Date: 12/01/2019  Eligibility End Date: 02/29/2020    This subscriber receives MA12 - Prepaid Medical Assistance Program (PMAP) delivered through [employer]. The phone numbers is [phone] ().

## 2021-06-07 NOTE — PROGRESS NOTES
Pt came in for Depo shot. Injection given and pt tolerated. I helped pt reschedule next Depo appt as well as change her appt in May to next week (f/u prediabetes and pelvic pain). Pt states that she is having pelvic pain today and she would like to talk to only PCP. Pt requested a glucometer, but I told her that she would have to ask her PCP when she does telephone visit. A1C rechecked today per pt request (he states that she did not eat anything yet; time right now:10 am). FXiong, CMA

## 2021-06-07 NOTE — TELEPHONE ENCOUNTER
"Called via Telelanguage  ID [id number].  Asked travel screen questions; all responses \"no\".  Confirmed date and time of appointment.    "

## 2021-06-07 NOTE — PROGRESS NOTES
"Fan Duran is a 25 y.o. female who is being evaluated via a billable telephone visit.      The patient has been notified of following:     \"This telephone visit will be conducted via a call between you and your physician/provider. We have found that certain health care needs can be provided without the need for a physical exam.  This service lets us provide the care you need with a short phone conversation.  If a prescription is necessary we can send it directly to your pharmacy.  If lab work is needed we can place an order for that and you can then stop by our lab to have the test done at a later time.    Telephone visits are billed at different rates depending on your insurance coverage. During this emergency period, for some insurers they may be billed the same as an in-person visit.  Please reach out to your insurance provider with any questions.    If during the course of the call the physician/provider feels a telephone visit is not appropriate, you will not be charged for this service.\"    Patient has given verbal consent to a Telephone visit? Yes    Patient would like to receive their AVS by AVS Preference: Mail a copy.    Additional provider notes: pelvic pain     4/10 stomach pain/PMS and back pain like I am giving birth.  I'm not sure what is going on.  My stomach is getting bigger like there is a baby, but I am not pregnant.  I use birth control - depo shot. It was not late.  LMP 4/10 with only a little, clotted.  Has pain meds to take prn.    Feels safe at home. Has food.    Assessment/Plan:  1. Pelvic pain in female  yashira with u/s; she plans to do a home pregnancy test  - US Pelvis With Transvaginal Non OB; Future      Phone call duration:  15 minutes  10:47 - 11:02  MD Aminta Morejon MA      "

## 2021-06-08 NOTE — TELEPHONE ENCOUNTER
Please call aFn. Make sure she knows her pelvic u/s was normal.  Ask if her pelvic pain is better? Route the answer to me.

## 2021-06-08 NOTE — TELEPHONE ENCOUNTER
Called pt and relayed pelvic US normal.  Left message for pt to call back with answer if having pelvic pain.  Thanks.

## 2021-06-09 NOTE — TELEPHONE ENCOUNTER
Patient scheduled to receive Depo at 7/15/20 CSS appointment.    Order ended at 4/21/20 administration.    Will Provider place new order if indicated?   Thank you.

## 2021-06-09 NOTE — TELEPHONE ENCOUNTER
Confirmed  and appointment date/time.  Travel screen done and documented.  Informed of need to wear mask in clinic.

## 2021-06-12 NOTE — TELEPHONE ENCOUNTER
Refill Approved    Rx renewed per Medication Renewal Policy. Medication was last renewed on 10/15/19, last OV 10/9/20.    Sue Lauren, [facility] Triage/Med Refill 10/25/2020     Requested Prescriptions   Pending Prescriptions Disp Refills     albuterol (PROAIR HFA;PROVENTIL HFA;VENTOLIN HFA) 90 mcg/actuation inhaler [Pharmacy Med Name: ALBUTEROL HFA INH(200 PUFFS)18GM] 36 g 0     Sig: INHALE 2 PUFFS BY MOUTH EVERY 6 HOURS AS NEEDED FOR WHEEZING       Albuterol/Levalbuterol Refill Protocol Passed - 10/23/2020  3:55 AM        Passed - PCP or prescribing provider visit in last year     Last office visit with prescriber/PCP: 3/11/2020 Gloria Berg MD OR same dept: 10/9/2020 Vicky Ortiz MD OR same specialty: 10/9/2020 Vicky Ortiz MD Last physical: Visit date not found       Next appt within 3 mo: Visit date not found  Next physical within 3 mo: Visit date not found  Prescriber OR PCP: Gloria Berg MD  Last diagnosis associated with med order: 1. Mild intermittent asthma with exacerbation  - albuterol (PROAIR HFA;PROVENTIL HFA;VENTOLIN HFA) 90 mcg/actuation inhaler [Pharmacy Med Name: ALBUTEROL HFA INH(200 PUFFS)18GM]; INHALE 2 PUFFS BY MOUTH EVERY 6 HOURS AS NEEDED FOR WHEEZING  Dispense: 36 g; Refill: 0    If protocol passes may refill for 6 months if within 3 months of last provider visit (or a total of 9 months). If patient requesting >1 inhaler per month refill x 6 months and have patient make appointment with provider.

## 2021-06-12 NOTE — PROGRESS NOTES
"QIANA Duran is a 25 y.o. female here for changing birth control. She is currently using Depo but has gained about 10 lbs. She would like to try the patch.   Felt like Nexplanon and MIrena made her \"emotional and sensitive.\"     Plans to try patch for a couple months and if she doesn't like it, will stop it and try to get pregnant again because she and her  feel like they might want another baby soon anyway. They have 2 children already.     Past Medical History:   Diagnosis Date     Asthma 2019     H/O: iron deficiency anemia     as a young child     Miscarriage 10/1/2019     Overweight      Current Outpatient Medications on File Prior to Visit   Medication Sig Dispense Refill     albuterol (PROAIR HFA;PROVENTIL HFA;VENTOLIN HFA) 90 mcg/actuation inhaler Inhale 2 puffs every 6 (six) hours as needed for wheezing. 2 each 11     fluticasone propionate (FLOVENT HFA) 44 mcg/actuation inhaler Inhale 2 puffs 2 (two) times a day. 1 Inhaler 12     acetaminophen (TYLENOL) 500 MG tablet Take 1,000 mg by mouth.       carboxymethylcellulose (REFRESH LIQUIGEL) 1 % ophthalmic solution Use in dry/itchy eyes every hour as needed 30 mL 12     cholecalciferol, vitamin D3, 5,000 unit capsule Take 1 capsule (5,000 Units total) by mouth daily. 90 capsule 4     prenatal vit-iron fum-folic ac (PRENATAL VITAMIN) 27 mg iron- 0.8 mg Tab tablet Take 1 tablet by mouth daily. 90 tablet 3     senna-docusate (PERICOLACE) 8.6-50 mg tablet Take 2 tablets by mouth daily as needed for constipation. 60 tablet 5     No current facility-administered medications on file prior to visit.        Past medical and social history reviewed with no changes.   ?  ROS:  No fever or chills  No cough or shortness of breath    ?  O  BP 90/72   Pulse 86   Temp 98.5  F (36.9  C) (Oral)   Resp 16   Ht 4' 11\" (1.499 m)   Wt 161 lb 8 oz (73.3 kg)   LMP 10/03/2020 (Approximate) Comment: spotting  SpO2 97% Comment: ra  Breastfeeding No   BMI 32.62 " kg/m     Vitals reviewed. Nursing note reviewed.  General Appearance: Pleasant and alert, in no acute distress  HEENT: mucous membranes moist  Skin: warm, dry, intact, no rash noted  Neuro: no focal deficits, CNs II-XII normal.   Psych: mood and affect are normal.    A/DANY Chavira was seen today for contraception.    Diagnoses and all orders for this visit:    Encounter for counseling regarding initiation of other contraceptive measure: will start patch. Went over instructions. She does not have a history of blood clots or migraines with aura that would preclude her from using estrogen.     She will let me know if this is not working for her for some reason and she wants to try to get pregnant.     -     norelgestromin-ethinyl estradiol (ORTHO EVRA) 150-35 mcg/24 hr; Place 1 patch on the skin every 7 days.         Return in about 2 months (around 12/9/2020) for recheck.      The entire visit was conducted through a professional .   Options for treatment and follow-up care were reviewed with the patient and/or guardian. Fan KIRAN Renee and/or guardian engaged in the decision making process and verbalized understanding of the options discussed and agreed with the final plan.    Vicky Ortiz MD

## 2021-06-13 NOTE — PROGRESS NOTES
Fan Duran is a 25 y.o. female here for birth control discussion, abdominal pain    ASSESSMENT/PLAN:   Fan was seen today for contraception.    Diagnoses and all orders for this visit:    PMS (premenstrual syndrome)  Abdominal pain, generalized  Pelvic pain in female  Patient has nausea without vomiting, severe abdominal pain when menstruating. This only happened since she has been on birth control. Used 10 tablets of oxycodone in 1 year, PDMP and chart reviewed and is appropriate. Will not escalate use or refill. See birth control discussion below.   -     etonogestreL-ethinyl estradioL (NUVARING) 0.12-0.015 mg/24 hr vaginal ring; Insert 1 each into the vagina every 21 days. Insert one (1) ring vaginally and leave in place for three (3) weeks, then remove for one (1) week.  -     oxyCODONE (ROXICODONE) 5 MG immediate release tablet; Take 1 tablet (5 mg total) by mouth every 6 (six) hours as needed for pain.  -     naproxen (NAPROSYN) 500 MG tablet; Take 1 tablet (500 mg total) by mouth 2 (two) times a day as needed (with meals, for period pain).    Uses birth control  Stop patches, start nuva ring. Patient hoping her periods will be lighter. Has tried IUD and nexplanon and did not like them.   -     etonogestreL-ethinyl estradioL (NUVARING) 0.12-0.015 mg/24 hr vaginal ring; Insert 1 each into the vagina every 21 days. Insert one (1) ring vaginally and leave in place for three (3) weeks, then remove for one (1) week.    Sciatica of left side  Since the birth of her child, has been having tingling/discomfort. Will start with low dose gabapentin. Consider PT.   -     gabapentin (NEURONTIN) 100 MG capsule; Take 100 mg by mouth 3 (three) times a day as needed (left leg pain).          Return in about 1 month (around 1/21/2021) for follow up birth control, leg pain.       ======================================================    SUBJECTIVE  Benfeliz Duran is a 25 y.o. female here for birth control,  abd pain.     Abd pain during period, heavier than they used to be since her miscarriage in 2019. She wants to try to have another kid, but not until April 2021 when her son is 4. She has tried multiple birth control, see below. She did use 10 tablets of oxycodone when acetaminophen and ibuprofen did not help, lasted 1 year.     Left sided leg pain since birth of her last child, had an epidural. Flares up when she sits too long. Has not tried any medications.     Birth control.   Significant weight gain on nexplanon and depo. Mood swings with them too. Heavy periods on patch. Will trial nuvaring and follow up in 1 month.     ROS  Complete 10 point review of systems negative except as noted above in HPI    Reviewed Past Medical History, Medications, Family History and Social History in Epic and up to date with no new changes.    OBJECTIVE  /70   Pulse 82   Temp 97.9  F (36.6  C) (Oral)   Resp 18   Wt 160 lb 12 oz (72.9 kg)   LMP 12/17/2020   SpO2 98%   Breastfeeding No   BMI 32.47 kg/m       General: Cooperative, pleasant, in no acute distress  HEENT: PERRL, EOMI.  TM normal bilaterally.  Pharynx normal without erythema.  Tonsils normal without hypertrophy or exudates.  Neck: no lymphadenopathy, no masses  CV: RRR, normal S1/S2, no murmur, rubs, gallops  Resp: No respiratory distress. Clear to auscultation bilaterally. No wheezes, rales, rhonchi  Abd: Nontender, nondistended, bowel sounds present  Ext: radial/pedal pulses +2 bilaterally  MSK: Normal muscle tone  Neuro: CN II-XII intact  Skin: warm, well perfused. No rashes  Psych: No suicidal or homicidal ideations, no self-harm.  Normal affect.    LABS & IMAGES   Results for orders placed or performed in visit on 04/21/20   Glycosylated Hemoglobin A1c   Result Value Ref Range    Hemoglobin A1c 5.6 3.5 - 6.0 %         ======================================================    Patient is fluent in english.     Options for treatment and follow-up care were  reviewed with the patient. Fan Duran and/or guardian was engaged and actively involved in the decision making process. Fan Duran and/or guardian verbalized understanding of the options discussed and was satisfied with the final plan.      Madhu Howell MD

## 2021-06-14 NOTE — PROGRESS NOTES
"Fan Duran is a 25 y.o. female here for follow up back pain    ASSESSMENT/PLAN:   Fan was seen today for follow-up.    Diagnoses and all orders for this visit:    Desire for pregnancy  -     prenatal vit-iron fum-folic ac (PRENATAL VITAMIN) 27 mg iron- 0.8 mg Tab tablet; Take 1 tablet by mouth daily.  - Removed birth control from med list  - If pregnant, stop naproxen    Sciatica of left side  -     gabapentin (NEURONTIN) 100 MG capsule; Take 100 mg by mouth 3 (three) times a day as needed (left leg pain).          Return in about 3 months (around 4/7/2021) for Annual physical.       ======================================================    SUBJECTIVE  Fan Duran is a 25 y.o. female here for follow up birth control.     Her sciatica is better with low dose gabapentin, no changes today.    She couldn't figure out how to use the nuva ring, but she and her  want another child now so she wants to stop birth control. Will send in PNV to start. If pregnant, sh eneeds to stop naproxen and only use acetaminophen    ROS  Complete 10 point review of systems negative except as noted above in HPI    Reviewed Past Medical History, Medications, Family History and Social History in Epic and up to date with no new changes.    OBJECTIVE  /68   Pulse 92   Temp 98  F (36.7  C) (Oral)   Resp 16   Ht 4' 11\" (1.499 m)   Wt 158 lb 4 oz (71.8 kg)   LMP 12/17/2020   SpO2 97%   BMI 31.96 kg/m       General: Cooperative, pleasant, in no acute distress  HEENT: PERRL, EOMI.    Neck: no lymphadenopathy, no masses  CV: RRR, normal S1/S2, no murmur, rubs, gallops  Resp: No respiratory distress.   Ext: radial/pedal pulses +2 bilaterally  MSK: Normal muscle tone  Neuro: CN II-XII intact  Skin: warm, well perfused. No rashes  Psych: No suicidal or homicidal ideations, no self-harm.  Normal affect.    LABS & IMAGES   Results for orders placed or performed in visit on 04/21/20   Glycosylated Hemoglobin A1c "   Result Value Ref Range    Hemoglobin A1c 5.6 3.5 - 6.0 %         ======================================================    Options for treatment and follow-up care were reviewed with the patient. Fan Duran and/or guardian was engaged and actively involved in the decision making process. Fan Duran and/or guardian verbalized understanding of the options discussed and was satisfied with the final plan.      Madhu Howell MD

## 2021-06-14 NOTE — TELEPHONE ENCOUNTER
RN cannot approve Refill Request    RN can NOT refill this medication Refills sent on 01/07/2021 to same pharmacy, with refills x 1 year. Last office visit: 1/7/2021 Madhu Howell MD Last Physical: Visit date not found Last MTM visit: Visit date not found Last visit same specialty: 1/7/2021 Madhu Howell MD.  Next visit within 3 mo: Visit date not found  Next physical within 3 mo: Visit date not found      Aracelis Zapata, [facility] Triage/Med Refill 1/18/2021    Requested Prescriptions   Pending Prescriptions Disp Refills     gabapentin (NEURONTIN) 100 MG capsule [Pharmacy Med Name: GABAPENTIN 100MG CAPSULES] 90 capsule 3     Sig: TAKE 1 CAPSULE BY MOUTH THREE TIMES DAILY AS NEEDED FOR PAIN       Gabapentin/Levetiracetam/Tiagabine Refill Protocol  Passed - 1/18/2021  8:31 AM        Passed - PCP or prescribing provider visit in past 12 months or next 3 months     Last office visit with prescriber/PCP: 1/7/2021 Madhu Howell MD OR same dept: 1/7/2021 Madhu Howell MD OR same specialty: 1/7/2021 Madhu Howell MD  Last physical: Visit date not found Last MTM visit: Visit date not found   Next visit within 3 mo: Visit date not found  Next physical within 3 mo: Visit date not found  Prescriber OR PCP: Madhu Howell MD  Last diagnosis associated with med order: 1. Sciatica of left side  - gabapentin (NEURONTIN) 100 MG capsule [Pharmacy Med Name: GABAPENTIN 100MG CAPSULES]; TAKE 1 CAPSULE BY MOUTH THREE TIMES DAILY AS NEEDED FOR PAIN  Dispense: 90 capsule; Refill: 3    If protocol passes may refill for 12 months if within 3 months of last provider visit (or a total of 15 months).

## 2021-06-16 PROBLEM — E66.09 CLASS 1 OBESITY DUE TO EXCESS CALORIES WITH SERIOUS COMORBIDITY AND BODY MASS INDEX (BMI) OF 31.0 TO 31.9 IN ADULT: Status: ACTIVE | Noted: 2020-03-12

## 2021-06-16 PROBLEM — E66.811 CLASS 1 OBESITY DUE TO EXCESS CALORIES WITH SERIOUS COMORBIDITY AND BODY MASS INDEX (BMI) OF 31.0 TO 31.9 IN ADULT: Status: ACTIVE | Noted: 2020-03-12

## 2021-06-16 NOTE — TELEPHONE ENCOUNTER
Telephone Encounter by Lindsay Cuellar at 11/4/2019  3:30 PM     Author: Lindsay Cuellar Service: -- Author Type: --    Filed: 11/4/2019  3:32 PM Encounter Date: 10/30/2019 Status: Signed    : Lindsay Cuellar       No PA needed for Flovent, this is a covered medication under her PMAP plan.  This was discontinued in Epic.  Per telephone note from 10/16/19 this was too expensive for patient.

## 2021-06-20 ENCOUNTER — HEALTH MAINTENANCE LETTER (OUTPATIENT)
Age: 26
End: 2021-06-20

## 2021-06-20 NOTE — LETTER
Letter by Analisa Hodge MD at      Author: Analisa Hodge MD Service: -- Author Type: --    Filed:  Encounter Date: 1/8/2020 Status: Signed         Elainaangelinfeliz Nima Duran  [address] [place] [address]             January 8, 2020        Dear MsKrystin Duran,    Below are the results from your recent visit:      Your pap smear was normal.  This should be repeated in three years.      Please call with questions or contact us using [employer].    Sincerely,        Electronically signed by Analisa Hodge MD

## 2021-06-20 NOTE — LETTER
Letter by Gloria Berg MD at      Author: Gloria Berg MD Service: -- Author Type: --    Filed:  Encounter Date: 1/24/2020 Status: (Other)         Fan Duran  [address] [place] [address]             January 24, 2020         Dear Ms. Duran,    Below are the results from your recent visit:    Resulted Orders   Comprehensive Metabolic Panel   Result Value Ref Range    Sodium 141 136 - 145 mmol/L    Potassium 3.8 3.5 - 5.0 mmol/L    Chloride 107 98 - 107 mmol/L    CO2 21 (L) 22 - 31 mmol/L    Anion Gap, Calculation 13 5 - 18 mmol/L    Glucose 79 70 - 125 mg/dL    BUN 11 8 - 22 mg/dL    Creatinine 0.62 0.60 - 1.10 mg/dL    GFR MDRD Af Amer >60 >60 mL/min/1.73m2    GFR MDRD Non Af Amer >60 >60 mL/min/1.73m2    Bilirubin, Total 0.3 0.0 - 1.0 mg/dL    Calcium 9.4 8.5 - 10.5 mg/dL    Protein, Total 7.7 6.0 - 8.0 g/dL    Albumin 4.1 3.5 - 5.0 g/dL    Alkaline Phosphatase 59 45 - 120 U/L    AST 20 0 - 40 U/L    ALT 24 0 - 45 U/L    Narrative    Fasting Glucose reference range is 70-99 mg/dL per  American Diabetes Association (ADA) guidelines.   Thyroid Stimulating Hormone (TSH)   Result Value Ref Range    TSH 0.75 0.30 - 5.00 uIU/mL   HM1 (CBC with Diff)   Result Value Ref Range    WBC 9.3 4.0 - 11.0 thou/uL    RBC 5.11 3.80 - 5.40 mill/uL    Hemoglobin 14.4 12.0 - 16.0 g/dL    Hematocrit 42.8 35.0 - 47.0 %    MCV 84 80 - 100 fL    MCH 28.1 27.0 - 34.0 pg    MCHC 33.6 32.0 - 36.0 g/dL    RDW 12.0 11.0 - 14.5 %    Platelets 266 140 - 440 thou/uL    MPV 8.5 7.0 - 10.0 fL    Neutrophils % 54 50 - 70 %    Lymphocytes % 33 20 - 40 %    Monocytes % 5 2 - 10 %    Eosinophils % 7 (H) 0 - 6 %    Basophils % 1 0 - 2 %    Neutrophils Absolute 5.0 2.0 - 7.7 thou/uL    Lymphocytes Absolute 3.0 0.8 - 4.4 thou/uL    Monocytes Absolute 0.5 0.0 - 0.9 thou/uL    Eosinophils Absolute 0.7 (H) 0.0 - 0.4 thou/uL    Basophils Absolute 0.1 0.0 - 0.2 thou/uL       These are good results. You have a few extra  eosinophils, which are allergy-cells, but we can follow that and see if it persists.    Please call with questions or contact us using [employer].    Sincerely,        Electronically signed by Gloria Berg MD

## 2021-06-28 NOTE — PROGRESS NOTES
Progress Notes by Abi Muñoz LPN at 1/21/2020  9:00 AM     Author: Abi Muñoz LPN Service: -- Author Type: Licensed Nurse    Filed: 1/21/2020  9:20 AM Encounter Date: 1/21/2020 Status: Attested    : Abi Muñoz LPN (Licensed Nurse) Cosigner: Gloria Berg MD at 1/21/2020  4:40 PM    Attestation signed by Gloria Berg MD at 1/21/2020  4:40 PM    thx                Patient received Depo today.  She inquired about other immunizations that may be due.  It appears Tdap is.  I gave her the VIS so she could read about it.   She will likely make appt with Provider for headache and may want immunization at that time.  No known place to send [facility] for past records.

## 2021-06-28 NOTE — PROGRESS NOTES
Progress Notes by Joi Mcknight, Diabetes Ed at 2/12/2020  3:00 PM     Author: Joi Mcknight, Diabetes Ed Service: -- Author Type: Diabetes Ed    Filed: 2/12/2020  5:36 PM Encounter Date: 2/12/2020 Status: Attested    : Joi Mcknight, Diabetes Ed (Diabetes Ed) Cosigner: Gloria Berg MD at 2/12/2020  9:09 PM    Attestation signed by Gloria Berg MD at 2/12/2020  9:09 PM    Thanks. I hope with this info, she can avoid full diabetes for a long time.                Assessment: pt here for DSME/T. A1C: 5.7 (1/2/20), borderline Prediabetes. Initial visit. Fam hx: maternal grandfather.  Family consists of  and 2 kids: 2 and 8y/o    - Has gained 3-4 lbs the past 3 months.     - Consumes 1-2 meals/d and skips BF typically. Loves noodles. Shares that she does find herself nibbling all day long though. Also that sometimes she is so hungry by the time she eats/snacks that she will often overindulge: she will eat the whole bag of oranges at times.   Recall:   BF: black coffee  L: 2 cups of noodles   D: rice, turkey soup, fish paste    -  Drinks very little water, will be working on drinking more. Denies any sugary beverages, eats sweets/desserts sometimes.   - is busy taking care of her 2 and 6 y/o kids and with chores and has no formal exercise routine.     Education/Discussion: Reviewed prediabetes/diabetes basics, AIC goals, general diet guidelines and CHO foods, complications of uncontrolled diabetes, planning ahead for healthy meals, examples of quick and healthy meals/snacks: pt verbalized understanding.    ++ she is not interested in monitoring her BS at this point.    Plan:   - aim for at least 30 mins of exercise/physical activity, as able. You can spread it out throughout the day.  - eat a variety of nutrient dense foods, avoid high fat/high calorie foods  - limit high carb foods, include more lean proteins and veggies for satiety  - eat 3 small meals, do not skip meals  - adequate  hydration  - call with any questions/concerns: phone number provided   - f/u in ~ 6 months (or sooner if concerns)      Subjective and Objective:      Benfeliz Duran is referred by Dr. Berg for Diabetes Education.     Lab Results   Component Value Date    HGBA1C 5.7 01/02/2020     Goals:  Eat 3 small meals/d  Not skip meals  Limit high CHO foods, including noodles, rice, too much fruit  F/u in 6 months       Education:     Monitoring   Meter (per above goals): Assessed, Discussed and Patient returned demonstration  Monitoring: Assessed, Discussed and Patient returned demonstration  BG goals: Assessed and Discussed    Nutrition Management  Nutrition Management: Assessed and Discussed  Weight: Assessed and Discussed  Portions/Balance: Assessed and Discussed  Carb ID/Count: Assessed and Discussed  Label Reading: Not addressed  Heart Healthy Fats: Not addressed  Menu Planning: Assessed  Dining Out: Assessed  Physical Activity: Assessed and Discussed  Medications: Assessed and Discussed  Diabetes Disease Process: Assessed and Discussed    Acute Complications: Prevent, Detect, Treat:  Hypoglycemia: Assessed and Discussed  Hyperglycemia: Assessed and Discussed  Sick Days: Assessed  Driving: Assessed    Chronic Complications  Foot Care:Assessed  Skin Care: Assessed and Discussed  Eye: Assessed  ABC: Assessed and Discussed  Teeth:Assessed  Goal Setting and Problem Solving: Assessed and Discussed  Barriers: Assessed and Discussed  Psychosocial Adjustments: Assessed      Time spent with the patient: 60 minutes for diabetes education and counseling.   Previous Education: no  Visit Type:RUDDY Mcknight  2/12/2020

## 2021-07-03 NOTE — ADDENDUM NOTE
Addendum Note by Yanick Berg MD at 10/29/2019  1:40 PM     Author: Yanick Berg MD Service: -- Author Type: Physician    Filed: 11/1/2019  7:31 PM Encounter Date: 10/29/2019 Status: Signed    : Yanick Berg MD (Physician)    Addended by: YANICK BERG on: 11/1/2019 07:31 PM        Modules accepted: Orders

## 2021-07-14 PROBLEM — O03.9 MISCARRIAGE: Status: RESOLVED | Noted: 2019-10-01 | Resolved: 2020-01-03

## 2021-07-23 ENCOUNTER — IMMUNIZATION (OUTPATIENT)
Dept: NURSING | Facility: CLINIC | Age: 26
End: 2021-07-23
Payer: COMMERCIAL

## 2021-07-23 PROCEDURE — 91301 PR COVID VAC MODERNA 100 MCG/0.5 ML IM: CPT

## 2021-07-23 PROCEDURE — 0011A PR COVID VAC MODERNA 100 MCG/0.5 ML IM: CPT

## 2021-08-20 ENCOUNTER — IMMUNIZATION (OUTPATIENT)
Dept: NURSING | Facility: CLINIC | Age: 26
End: 2021-08-20
Attending: FAMILY MEDICINE
Payer: COMMERCIAL

## 2021-08-20 PROCEDURE — 91301 PR COVID VAC MODERNA 100 MCG/0.5 ML IM: CPT

## 2021-08-20 PROCEDURE — 0012A PR COVID VAC MODERNA 100 MCG/0.5 ML IM: CPT

## 2021-10-06 ENCOUNTER — OFFICE VISIT (OUTPATIENT)
Dept: FAMILY MEDICINE | Facility: CLINIC | Age: 26
End: 2021-10-06
Payer: COMMERCIAL

## 2021-10-06 VITALS
HEART RATE: 90 BPM | RESPIRATION RATE: 16 BRPM | BODY MASS INDEX: 34.23 KG/M2 | WEIGHT: 169.5 LBS | TEMPERATURE: 98.2 F | DIASTOLIC BLOOD PRESSURE: 60 MMHG | OXYGEN SATURATION: 97 % | SYSTOLIC BLOOD PRESSURE: 102 MMHG

## 2021-10-06 DIAGNOSIS — R73.03 PREDIABETES: ICD-10-CM

## 2021-10-06 DIAGNOSIS — J45.21 MILD INTERMITTENT ASTHMA WITH EXACERBATION: ICD-10-CM

## 2021-10-06 DIAGNOSIS — Z23 NEED FOR IMMUNIZATION AGAINST INFLUENZA: Primary | ICD-10-CM

## 2021-10-06 LAB
HBA1C MFR BLD: 6.3 % (ref 0–5.6)
HOLD SPECIMEN: NORMAL

## 2021-10-06 PROCEDURE — 90471 IMMUNIZATION ADMIN: CPT | Performed by: FAMILY MEDICINE

## 2021-10-06 PROCEDURE — 90686 IIV4 VACC NO PRSV 0.5 ML IM: CPT | Performed by: FAMILY MEDICINE

## 2021-10-06 PROCEDURE — 83036 HEMOGLOBIN GLYCOSYLATED A1C: CPT | Performed by: FAMILY MEDICINE

## 2021-10-06 PROCEDURE — 99215 OFFICE O/P EST HI 40 MIN: CPT | Mod: 25 | Performed by: FAMILY MEDICINE

## 2021-10-06 PROCEDURE — 36415 COLL VENOUS BLD VENIPUNCTURE: CPT | Performed by: FAMILY MEDICINE

## 2021-10-06 RX ORDER — ALBUTEROL SULFATE 90 UG/1
2 AEROSOL, METERED RESPIRATORY (INHALATION) EVERY 6 HOURS PRN
Qty: 36 G | Refills: 2 | Status: SHIPPED | OUTPATIENT
Start: 2021-10-06 | End: 2022-05-13

## 2021-10-06 ASSESSMENT — ASTHMA QUESTIONNAIRES
QUESTION_5 LAST FOUR WEEKS HOW WOULD YOU RATE YOUR ASTHMA CONTROL: WELL CONTROLLED
QUESTION_2 LAST FOUR WEEKS HOW OFTEN HAVE YOU HAD SHORTNESS OF BREATH: NOT AT ALL
QUESTION_4 LAST FOUR WEEKS HOW OFTEN HAVE YOU USED YOUR RESCUE INHALER OR NEBULIZER MEDICATION (SUCH AS ALBUTEROL): NOT AT ALL
QUESTION_3 LAST FOUR WEEKS HOW OFTEN DID YOUR ASTHMA SYMPTOMS (WHEEZING, COUGHING, SHORTNESS OF BREATH, CHEST TIGHTNESS OR PAIN) WAKE YOU UP AT NIGHT OR EARLIER THAN USUAL IN THE MORNING: NOT AT ALL
QUESTION_1 LAST FOUR WEEKS HOW MUCH OF THE TIME DID YOUR ASTHMA KEEP YOU FROM GETTING AS MUCH DONE AT WORK, SCHOOL OR AT HOME: NONE OF THE TIME
ACT_TOTALSCORE: 24

## 2021-10-06 NOTE — PROGRESS NOTES
"    Assessment & Plan     Mild intermittent asthma with exacerbation  Controlled well with only prn albuterol. Not using other inhalers  - albuterol (PROAIR HFA/PROVENTIL HFA/VENTOLIN HFA) 108 (90 Base) MCG/ACT inhaler  Dispense: 36 g; Refill: 2    Prediabetes  Found today. When offered metformin, she asked if she could try more with diet, exercise and regular, good sleep. I said OK. Recheck A1-C in Jan., sooner if a pregnancy is attained  - Hemoglobin A1c  - Extra Tube  - Hemoglobin A1c  - Extra Tube    Need for immunization against influenza  given  - INFLUENZA VACCINE IM >6 MO VALENT IIV4 (ALFURIA/FLUZONE)    We discussed fertility and trying to attain pregnancy today. She is glad to keep trying x 3 months. If nothing by Dec/Jan, then I'll refer her to OBGYN for consideration for Clomid.    40 minutes spent on the date of the encounter doing chart review, history and exam, documentation and further activities per the note       BMI:   Estimated body mass index is 34.23 kg/m  as calculated from the following:    Height as of 1/7/21: 1.499 m (4' 11\").    Weight as of this encounter: 76.9 kg (169 lb 8 oz).   Weight management plan: Discussed healthy diet and exercise guidelines    Work on weight loss  Regular exercise  Try to keep sleep times the same (she works overnight)    Return in about 3 months (around 1/6/2022) for Follow up.    Gloria Berg MD  [facility] STAR Chavira is a 26 year old who presents for the following health issues     HPI   Knows she's overweight; hopes for one more pregnancy    She is having regular monthly periods with PMS.  Sept - 4 (22nd-25th) days bleeding - and late  Aug - 9th 5 days  July 10th   She has no idea when to have sex to get a pregnancy. She notes her  only wants to have sex 2 times per month. She calls him lazy if he wants a pregnancy.    She knows she and her  are fat - with big tummies. She has been eating " better and less rice, but she requests an A1-C to see if she has diabetes. She fears she does    Asthma is fine now. She only uses albuterol prn and that is not often. She's glad for this.    Review of Systems         Objective    /60   Pulse 90   Temp 98.2  F (36.8  C) (Oral)   Resp 16   Wt 76.9 kg (169 lb 8 oz)   LMP 09/25/2021 (Approximate)   SpO2 97%   Breastfeeding No   BMI 34.23 kg/m    Body mass index is 34.23 kg/m .  Physical Exam   Gen:NAD, very patient with us, asks good questions  Mood: good  Affect: congruent  A1-C 6.3    Results for orders placed or performed in visit on 10/06/21 (from the past 24 hour(s))   Hemoglobin A1c   Result Value Ref Range    Hemoglobin A1C 6.3 (H) 0.0 - 5.6 %   Extra Tube    Narrative    The following orders were created for panel order Extra Tube.  Procedure                               Abnormality         Status                     ---------                               -----------         ------                     Extra Red Top Tube[[id number]]                               Final result                 Please view results for these tests on the individual orders.   Extra Red Top Tube   Result Value Ref Range    Hold Specimen JIC

## 2021-10-07 ASSESSMENT — ASTHMA QUESTIONNAIRES: ACT_TOTALSCORE: 24

## 2021-12-08 ENCOUNTER — OFFICE VISIT (OUTPATIENT)
Dept: FAMILY MEDICINE | Facility: CLINIC | Age: 26
End: 2021-12-08
Payer: COMMERCIAL

## 2021-12-08 VITALS
OXYGEN SATURATION: 99 % | HEIGHT: 59 IN | BODY MASS INDEX: 34.22 KG/M2 | RESPIRATION RATE: 16 BRPM | DIASTOLIC BLOOD PRESSURE: 68 MMHG | TEMPERATURE: 97.9 F | WEIGHT: 169.75 LBS | HEART RATE: 84 BPM | SYSTOLIC BLOOD PRESSURE: 108 MMHG

## 2021-12-08 DIAGNOSIS — Z34.90 PREGNANCY, UNSPECIFIED GESTATIONAL AGE: Primary | ICD-10-CM

## 2021-12-08 DIAGNOSIS — N92.6 MISSED MENSES: ICD-10-CM

## 2021-12-08 LAB — HCG UR QL: POSITIVE

## 2021-12-08 PROCEDURE — 99213 OFFICE O/P EST LOW 20 MIN: CPT | Performed by: FAMILY MEDICINE

## 2021-12-08 PROCEDURE — 81025 URINE PREGNANCY TEST: CPT | Performed by: FAMILY MEDICINE

## 2021-12-08 RX ORDER — PNV NO.95/FERROUS FUM/FOLIC AC 28MG-0.8MG
1 TABLET ORAL DAILY
Qty: 90 TABLET | Refills: 3 | Status: SHIPPED | OUTPATIENT
Start: 2021-12-08 | End: 2022-10-05

## 2021-12-08 ASSESSMENT — MIFFLIN-ST. JEOR: SCORE: 1415.61

## 2021-12-08 NOTE — PROGRESS NOTES
Assessment/Plan:     Fan Duran is a 26 year old  here for confirmation of pregnancy.    Fan was seen today for pregnancy test.    Diagnoses and all orders for this visit:    Pregnancy, unspecified gestational age: UPT positive.  at 6w6d by LMP, IDALIA 22. Reviewed starting prenatal vitamin, healthy eating/exercise due to recent prediabetes diagnosis. Will plan to check A1C with upcoming IOB labs.   -     Prenatal Vit-Fe Fumarate-FA (PRENATAL VITAMIN) 27-0.8 MG TABS; Take 1 tablet by mouth daily  -     US OB < 14 Weeks Single; Future  -     HCG qualitative urine      Medications were reviewed for safety in pregnancy.  Risk factors identified today: prediabetes, mild asthma, history of vacuum delivery due to failure to progress during 2nd stage with 1st baby    Return to clinic in about 4 weeks for Initial OB Visit.    Subjective:     Fan Duran is a 26 year old female who presents for evaluation of amenorrhea.   Patient's last menstrual period was 10/21/2021 (approximate).  Last period was normal  Current contraception:  None   Pregnancy is desires- trying since her last miscarriage   Current symptoms also include: breast tenderness and fatigue.  Never had nausea with her other 2 pregnancies- denies nausea.    Pregnancy test at home- very faint line and then repeated and it was only 1 line  Repeated again and it was 2 lines- last week.    Risk behaviors:  Pre-pregnancy BMI 34, Pre-diabetes  Tobacco use:  reports that she has never smoked. She has never used smokeless tobacco.  Drug or alcohol use: no      Current Outpatient Medications:      albuterol (PROAIR HFA/PROVENTIL HFA/VENTOLIN HFA) 108 (90 Base) MCG/ACT inhaler, Inhale 2 puffs into the lungs every 6 hours as needed (Patient not taking: Reported on 2021), Disp: 36 g, Rfl: 2     fluticasone propionate (FLOVENT HFA) 44 mcg/actuation inhaler, [FLUTICASONE PROPIONATE (FLOVENT HFA) 44 MCG/ACTUATION INHALER] Inhale 2 puffs  "2 (two) times a day. (Patient not taking: Reported on 12/8/2021), Disp: 1 Inhaler, Rfl: 12         Objective:     /68   Pulse 84   Temp 97.9  F (36.6  C) (Oral)   Resp 16   Ht 1.499 m (4' 11\")   Wt 77 kg (169 lb 12 oz)   LMP 10/21/2021 (Approximate)   SpO2 99%   Breastfeeding No   BMI 34.29 kg/m    Gen:  A&A, NAD  Abd: soft, non-tender  FHT's: too early    Lab Review  Results for orders placed or performed in visit on 12/08/21   HCG qualitative urine   Result Value Ref Range    hCG Urine Qualitative Positive (A) Negative          "

## 2021-12-08 NOTE — PATIENT INSTRUCTIONS
Patient Education     Adapting to Pregnancy: First Trimester  As your body adjusts during your first trimester of pregnancy, you may have to change or limit your daily activities. You ll need more rest. You may also need to use the energy you have more wisely.   Your changing body  Almost every part of your body is affected as you adapt to pregnancy. The uterus and cervix will start to soften right away. You may not look very pregnant during the first 3 months. But you are likely to have some common signs of early pregnancy:     Nausea    Fatigue    Frequent urination    Mood swings    Bloating of the belly    Constipation    Heartburn    Missed or light periods (first trimester bleeding)    Nipple or breast tenderness and breast swelling  It s not too late to start good habits   What matters most is protecting your baby from this moment on. If you smoke, drink alcohol, or use drugs, now is the time to stop. If you need help, talk with your healthcare provider:     Smoking increases the risk of stillbirth or having a low-birth-weight baby. If you smoke, quit now.    Alcohol and drugs have been linked with miscarriage, birth defects, intellectual disability, and low birth weight. Don't drink alcohol or take drugs.  Tips to relieve nausea  During pregnancy, nausea can happen at any time of the day, but it may be worse in the morning. To help prevent nausea:     Eat small, light meals at frequent intervals.    Drink fluids often.    Get up slowly. Eat a few unsalted crackers before you get out of bed.    Avoid smells that bother you.    Avoid spicy and fatty foods.    Eat an ice pop in your favorite flavor.    Get plenty of rest.    Ask your healthcare provider about taking gerber or vitamin B6 for nausea and vomiting.    Talk with your healthcare provider if you take vitamins that upset your stomach.   Work concerns  The end of the first trimester is a good time to discuss working during pregnancy with your employer.  Follow your healthcare provider s advice if your job needs you to stand for a long time, work with hazardous tools, or even sit at a desk all day. Your workspace, workload, or scheduled hours may need to be adjusted. Perhaps you can change body postures more often or take an extra break.   Advice for travel  Talk to your healthcare provider first, but the second trimester may be the best time for any travel. You may be advised to avoid certain trips while you re pregnant. Food and water can be concerns in developing countries. Travel by car is a good choice, as you can stop, get out, and stretch. Bring snacks and water along. Fasten the lap belt below your belly, low over your hips. Also be sure to wear the shoulder harness.   Intimacy  Unless your healthcare provider tells you to, there's no reason to stop having sex while you re pregnant. You or your partner may notice changes in desire. Desire may be less in the first trimester, due to nausea and fatigue. In the second trimester, sex may be very enjoyable. The third trimester can be a challenge comfort-wise. Try different positions and see what s best for you both.   [employer] last reviewed this educational content on 4/1/2020 2000-2021 [employer]. All rights reserved. This information is not intended as a substitute for professional medical care. Always follow your healthcare professional's instructions.

## 2021-12-21 ENCOUNTER — HOSPITAL ENCOUNTER (OUTPATIENT)
Dept: ULTRASOUND IMAGING | Facility: HOSPITAL | Age: 26
Discharge: HOME | End: 2021-12-21
Attending: FAMILY MEDICINE | Admitting: FAMILY MEDICINE
Payer: COMMERCIAL

## 2021-12-21 DIAGNOSIS — Z34.90 PREGNANCY, UNSPECIFIED GESTATIONAL AGE: ICD-10-CM

## 2021-12-21 PROCEDURE — 76801 OB US < 14 WKS SINGLE FETUS: CPT

## 2022-01-06 ENCOUNTER — PRENATAL OFFICE VISIT (OUTPATIENT)
Dept: FAMILY MEDICINE | Facility: CLINIC | Age: 27
End: 2022-01-06
Payer: COMMERCIAL

## 2022-01-06 VITALS
BODY MASS INDEX: 34.17 KG/M2 | RESPIRATION RATE: 16 BRPM | OXYGEN SATURATION: 99 % | HEART RATE: 98 BPM | HEIGHT: 59 IN | DIASTOLIC BLOOD PRESSURE: 62 MMHG | TEMPERATURE: 98.3 F | SYSTOLIC BLOOD PRESSURE: 100 MMHG | WEIGHT: 169.5 LBS

## 2022-01-06 DIAGNOSIS — Z34.90 PREGNANCY, UNSPECIFIED GESTATIONAL AGE: Primary | ICD-10-CM

## 2022-01-06 DIAGNOSIS — E66.811 CLASS 1 OBESITY WITHOUT SERIOUS COMORBIDITY IN ADULT, UNSPECIFIED BMI, UNSPECIFIED OBESITY TYPE: ICD-10-CM

## 2022-01-06 DIAGNOSIS — M54.42 BILATERAL LOW BACK PAIN WITH LEFT-SIDED SCIATICA, UNSPECIFIED CHRONICITY: ICD-10-CM

## 2022-01-06 DIAGNOSIS — Z34.80 PRENATAL CARE, SUBSEQUENT PREGNANCY, UNSPECIFIED TRIMESTER: ICD-10-CM

## 2022-01-06 LAB
ABO/RH(D): NORMAL
ALBUMIN UR-MCNC: NEGATIVE MG/DL
AMPHETAMINE-CLINIC: NORMAL
ANTIBODY SCREEN: NEGATIVE
APPEARANCE UR: ABNORMAL
BARBITURATES-CLINIC: NORMAL
BENZODIAZEPINES-CLINIC: NORMAL
BILIRUB UR QL STRIP: NEGATIVE
BUPRENORPHINE-CLINIC: NORMAL
COCAINE-CLINIC: NORMAL
COLOR UR AUTO: YELLOW
ECSTASY-CLINIC: NORMAL
ERYTHROCYTE [DISTWIDTH] IN BLOOD BY AUTOMATED COUNT: 12.8 % (ref 10–15)
GLUCOSE UR STRIP-MCNC: NEGATIVE MG/DL
HBA1C MFR BLD: 6.1 % (ref 0–5.6)
HCT VFR BLD AUTO: 37.9 % (ref 35–47)
HGB BLD-MCNC: 12.5 G/DL (ref 11.7–15.7)
HGB UR QL STRIP: NEGATIVE
HIV 1+2 AB+HIV1 P24 AG SERPL QL IA: NEGATIVE
KETONES UR STRIP-MCNC: NEGATIVE MG/DL
LEUKOCYTE ESTERASE UR QL STRIP: NEGATIVE
MARIJUANA-CLINIC: NORMAL
MCH RBC QN AUTO: 27.6 PG (ref 26.5–33)
MCHC RBC AUTO-ENTMCNC: 33 G/DL (ref 31.5–36.5)
MCV RBC AUTO: 84 FL (ref 78–100)
METHADONE METABOLITE-CLINIC: NORMAL
METHAMPHETAMINE-CLINIC: NORMAL
NITRATE UR QL: NEGATIVE
OPIATES-CLINIC: NORMAL
OXIDANTS: NORMAL
OXYCODONE-CLINIC: NORMAL
PCP 25-CLINIC: NORMAL
PH UR STRIP: 7 [PH] (ref 5–7)
PH-CLINIC: 7 (ref 5–8)
PLATELET # BLD AUTO: 296 10E3/UL (ref 150–450)
RBC # BLD AUTO: 4.53 10E6/UL (ref 3.8–5.2)
SP GR UR STRIP: 1.01 (ref 1–1.03)
SP GR UR STRIP: 1.02 (ref 1–1.03)
SPECIMEN EXPIRATION DATE: NORMAL
UROBILINOGEN UR STRIP-ACNC: 0.2 E.U./DL
WBC # BLD AUTO: 11.1 10E3/UL (ref 4–11)

## 2022-01-06 PROCEDURE — 87086 URINE CULTURE/COLONY COUNT: CPT | Performed by: FAMILY MEDICINE

## 2022-01-06 PROCEDURE — 86762 RUBELLA ANTIBODY: CPT | Performed by: FAMILY MEDICINE

## 2022-01-06 PROCEDURE — 85027 COMPLETE CBC AUTOMATED: CPT | Performed by: FAMILY MEDICINE

## 2022-01-06 PROCEDURE — 86803 HEPATITIS C AB TEST: CPT | Performed by: FAMILY MEDICINE

## 2022-01-06 PROCEDURE — 87340 HEPATITIS B SURFACE AG IA: CPT | Performed by: FAMILY MEDICINE

## 2022-01-06 PROCEDURE — 86900 BLOOD TYPING SEROLOGIC ABO: CPT | Performed by: FAMILY MEDICINE

## 2022-01-06 PROCEDURE — 99214 OFFICE O/P EST MOD 30 MIN: CPT | Performed by: FAMILY MEDICINE

## 2022-01-06 PROCEDURE — 87389 HIV-1 AG W/HIV-1&-2 AB AG IA: CPT | Performed by: FAMILY MEDICINE

## 2022-01-06 PROCEDURE — 83655 ASSAY OF LEAD: CPT | Mod: 90 | Performed by: FAMILY MEDICINE

## 2022-01-06 PROCEDURE — 83036 HEMOGLOBIN GLYCOSYLATED A1C: CPT | Performed by: FAMILY MEDICINE

## 2022-01-06 PROCEDURE — 99000 SPECIMEN HANDLING OFFICE-LAB: CPT | Performed by: FAMILY MEDICINE

## 2022-01-06 PROCEDURE — 80305 DRUG TEST PRSMV DIR OPT OBS: CPT | Performed by: FAMILY MEDICINE

## 2022-01-06 PROCEDURE — 86850 RBC ANTIBODY SCREEN: CPT | Performed by: FAMILY MEDICINE

## 2022-01-06 PROCEDURE — 36415 COLL VENOUS BLD VENIPUNCTURE: CPT | Performed by: FAMILY MEDICINE

## 2022-01-06 PROCEDURE — 99207 PR FIRST OB VISIT: CPT | Performed by: FAMILY MEDICINE

## 2022-01-06 PROCEDURE — 86780 TREPONEMA PALLIDUM: CPT | Performed by: FAMILY MEDICINE

## 2022-01-06 PROCEDURE — 86901 BLOOD TYPING SEROLOGIC RH(D): CPT | Performed by: FAMILY MEDICINE

## 2022-01-06 ASSESSMENT — MIFFLIN-ST. JEOR: SCORE: 1414.48

## 2022-01-06 NOTE — PROGRESS NOTES
New OB Clinic Note - 2022   Visit was completed along with Penelope .   SUBJECTIVE:  Fan Duran is a 26 year old  female @ 9w5d who is here for new OB visit.   LMP approximate. Ultrasound at 7 weeks gave EDC of 22.  Current Concerns:    Shedenies nausea and vomiting.   She denies bleeding, cramping. No loss of fluid. She denies HA.   Denies dysuria or hematuria.   Denies constipation.  OB History:  Patient is . Prior Pregnancies:  OB History    Para Term  AB Living   4 2 2 0 1 2   SAB IAB Ectopic Multiple Live Births   0 0 0 0 2      # Outcome Date GA Lbr Mauricio/2nd Weight Sex Delivery Anes PTL Lv   4 Current            3 AB 19           2 Term 17   3.175 kg (7 lb) M Vag-Spont   FATMATA      Birth Comments: Born in [facility]   1 Term 12   2.948 kg (6 lb 8 oz) F Vag-Vacuum   FATMATA      Birth Comments: [facility]. Vacuum assisted delivery for failure to progress in 2nd stage     She does not have a history of  birth before 37 weeks with a saini gestation.  She does not have a history of preeclampsia in prior pregnancy.   She does not have a history of recurrent (>2) pregnancy losses.  She does not have a history of Down's syndrome, sickle cell anemia or other genetic disorder affecting a child in her family.  She does not have a history of major depression or postpartum depression.  She does not have a history of STI's including Chlamydia, gonorrhea, Hep B virus, syphilis, HPV, or genital herpes.   Social Hx:   She has support from her   She has not used tobacco, has not used EtOH and has not used illicit drugs.  Current Medications: prenatal vitamins    Past Medical History:   Diagnosis Date     Asthma      H/O: iron deficiency anemia     as a young child     Miscarriage 10/1/2019     Overweight      Past Surgical History:   Procedure Laterality Date     NO PAST SURGERIES       Family History   Problem  "Relation Age of Onset     Depression Mother      Cancer Father         esophageal cancer - getting treatment; he is a smoker     Hepatitis Father         C     No Known Problems Other      No Known Problems Son      Current Outpatient Medications   Medication     Prenatal Vit-Fe Fumarate-FA (PRENATAL VITAMIN) 27-0.8 MG TABS     albuterol (PROAIR HFA/PROVENTIL HFA/VENTOLIN HFA) 108 (90 Base) MCG/ACT inhaler     No current facility-administered medications for this visit.     ?  OBJECTIVE:  Vitals:    01/06/22 0949   BP: 100/62   Pulse: 98   Resp: 16   Temp: 98.3  F (36.8  C)   TempSrc: Oral   SpO2: 99%   Weight: 76.9 kg (169 lb 8 oz)   Height: 1.499 m (4' 11\")     Gen: Awake, alert, in no acute distress  Abd: non-tender, non-distended. Bowel sounds present.   Pelvic Exam: Deferred, not due for pap smear  FHT: normal, bedside US showed normal cardiac activity  Results for orders placed or performed in visit on 01/06/22   UA without Microscopic - lab collect     Status: Abnormal   Result Value Ref Range    Color Urine Yellow Colorless, Straw, Light Yellow, Yellow    Appearance Urine Slightly Cloudy (A) Clear    Glucose Urine Negative Negative mg/dL    Bilirubin Urine Negative Negative    Ketones Urine Negative Negative mg/dL    Specific Gravity Urine 1.025 1.005 - 1.030    Blood Urine Negative Negative    pH Urine 7.0 5.0 - 7.0    Protein Albumin Urine Negative Negative mg/dL    Urobilinogen Urine 0.2 0.2, 1.0 E.U./dL    Nitrite Urine Negative Negative    Leukocyte Esterase Urine Negative Negative   Drugs of abuse, urine (CLINIC ONLY)     Status: Normal   Result Value Ref Range    Amphetamine Screen Negative Screen Negative    Barbiturates Screen Negative Screen Negative    Buprenorphine Screen Negative Screen Negative    Benzodiazepines Screen Negative Screen Negative    Cocaine Screen Negative Screen Negative    Methadone Metabolite Screen Negative Screen Negative    Ecstasy Screen Negative Screen Negative    " Methamphetamine Screen Negative Screen Negative    Opiates Screen Negative Screen Negative    Oxycodone Screen Negative Screen Negative    PCP 25 Screen Negative Screen Negative    Marijuana Screen Negative Screen Negative    Specific Gravity 1.015 1.003 - 1.030    pH 7.0 5.0 - 8.0    Oxidants Normal Normal    Narrative    Tests may be invalid when any one of the validity pad results ( SG, pH, OX ) are abnormal.  Drug            Screening Threshold    Amphetamine     > or = 500 ng/mL  Barbiturates    > or = 300 ng/mL  Buprenorphine   > or = 10 ng/mL  Benzodiazepines > or = 300 ng/mL  Cocaine         > or =150 ng/mL  Methadone Metabolite > or =300 ng/mL  Ecstasy         > or =500 ng/mL  Methamphetamine > or =500 ng/mL  Opiates         > or = 300 ng/mL  Oxycodone       > or = 100 ng/mL  PCP 25          > or = 25 ng/mL  Marijuana       > or =50 ng/mL    *Screening results are to be used only for medical purposes.  Unconfirmed screening results must not be used for non-  medical purposes.   CBC with platelets     Status: Abnormal   Result Value Ref Range    WBC Count 11.1 (H) 4.0 - 11.0 10e3/uL    RBC Count 4.53 3.80 - 5.20 10e6/uL    Hemoglobin 12.5 11.7 - 15.7 g/dL    Hematocrit 37.9 35.0 - 47.0 %    MCV 84 78 - 100 fL    MCH 27.6 26.5 - 33.0 pg    MCHC 33.0 31.5 - 36.5 g/dL    RDW 12.8 10.0 - 15.0 %    Platelet Count 296 150 - 450 10e3/uL   HIV Antigen Antibody Combo     Status: Normal   Result Value Ref Range    HIV Antigen Antibody Combo Negative Negative   Hemoglobin A1c     Status: Abnormal   Result Value Ref Range    Hemoglobin A1C 6.1 (H) 0.0 - 5.6 %   Adult Type and Screen     Status: None   Result Value Ref Range    ABO/RH(D) B POS     Antibody Screen Negative Negative    SPECIMEN EXPIRATION DATE [id number]    ABO/Rh type and screen     Status: None    Narrative    The following orders were created for panel order ABO/Rh type and screen.  Procedure                               Abnormality         Status                      ---------                               -----------         ------                     Adult Type and Screen[[id number]]                            Final result                 Please view results for these tests on the individual orders.     ASSESSMENT/PLAN:  Fan Duran is a 26 year old  at 9w5d weeks by 7 week US. Estimated Date of Delivery: Aug 6, 2022.   1. Discussed recommended weight gain, healthy eating habits, exercise, common first trimester concerns (nausea, vomiting, constipation, fatigue, GERD, urinary frequency) and warning signs for miscarriage and  labor (bleeding, cramping).   2. Started prenatal vitamins.   3. Pelvic exam deferred- not due for pap smear.   4. Prenatal labs completed - prenatal profile, UA/UC, HIV   5. Pre-pregnancy BMI 34- reviewed recommended weight gain, healthy eating, history of pre-diabetes- check A1C.  6. Reviewed eligibility for 17-hydroxyprogesterone therapy for prevention of  birth (prior saini delivery before 37 weeks). Patient is not a candidate for this.   7. Offered cell free DNA testing- patient is interested in this- filled out [employer] form and patient will schedule lab-only visit on Monday. She would like to know gender as well- will call with results.  8. Counseled on benefits of breastfeeding. Patient is not planning to breastfeed.   Fan was seen today for prenatal care.    Diagnoses and all orders for this visit:    Pregnancy, unspecified gestational age  -     ABO/Rh type and screen; Future  -     Hepatitis B surface antigen; Future  -     CBC with platelets; Future  -     HIV Antigen Antibody Combo; Future  -     Rubella Antibody IgG; Future  -     Treponema Abs w Reflex to RPR and Titer; Future  -     Urine Culture Aerobic Bacterial; Future  -     Hepatitis C antibody; Future  -     Hemoglobin A1c; Future  -     UA without Microscopic - lab collect; Future  -     Lead Venous Blood Confirm; Future  -      Drugs of abuse, urine (CLINIC ONLY); Future  -     Urine Culture Aerobic Bacterial  -     UA without Microscopic - lab collect  -     Drugs of abuse, urine (CLINIC ONLY)  -     ABO/Rh type and screen  -     Hepatitis B surface antigen  -     CBC with platelets  -     HIV Antigen Antibody Combo  -     Rubella Antibody IgG  -     Treponema Abs w Reflex to RPR and Titer  -     Hepatitis C antibody  -     Hemoglobin A1c  -     Lead Venous Blood Confirm    Class 1 obesity without serious comorbidity in adult, unspecified BMI, unspecified obesity type    Prenatal care, subsequent pregnancy, unspecified trimester    Bilateral low back pain with left-sided sciatica, unspecified chronicity  -     Physical Therapy Referral; Future    ?  RTC in 4 weeks or sooner if problems.     Sully Barney MD    Options for treatment and follow-up care were reviewed with the patient and/or guardian. Fan Duran and/or guardian engaged in the decision making process and verbalized understanding of the options discussed and agreed with the final plan.

## 2022-01-07 ENCOUNTER — TELEPHONE (OUTPATIENT)
Dept: FAMILY MEDICINE | Facility: CLINIC | Age: 27
End: 2022-01-07
Payer: COMMERCIAL

## 2022-01-07 LAB
BACTERIA UR CULT: NO GROWTH
HBV SURFACE AG SERPL QL IA: NONREACTIVE
HCV AB SERPL QL IA: NEGATIVE
RUBV IGG SERPL QL IA: 5.78 INDEX
RUBV IGG SERPL QL IA: POSITIVE
T PALLIDUM AB SER QL: NONREACTIVE

## 2022-01-07 NOTE — TELEPHONE ENCOUNTER
Reason for Call:  Request for results:    Name of test or procedure: multiple labs    Date of test of procedure: 1/06/22    Location of the test or procedure: [place] to leave the result message on voice mail or with a family member? YES    Phone number Patient can be reached at:  Cell number on file:    Telephone Information:   Mobile [phone]       Additional comments: Patient received test results in [facility] but she does not know what the numbers mean. What does Positive mean on Rubella Anti-body? Please review and let her know.    Call taken on 1/7/2022 at 4:53 PM by Bi Buitrago

## 2022-01-08 LAB — LEAD BLDV-MCNC: <2 UG/DL

## 2022-01-10 ENCOUNTER — LAB (OUTPATIENT)
Dept: LAB | Facility: CLINIC | Age: 27
End: 2022-01-10
Payer: COMMERCIAL

## 2022-01-10 DIAGNOSIS — Z34.90 PREGNANCY, UNSPECIFIED GESTATIONAL AGE: ICD-10-CM

## 2022-01-10 PROCEDURE — 36415 COLL VENOUS BLD VENIPUNCTURE: CPT

## 2022-01-10 NOTE — TELEPHONE ENCOUNTER
"Please let Fan know her labs are all normal. The \"positive\" reubella result means she is immune to that infection. All the other results are good -   Not having hep B antigen means she cannot spread the infection of hep B. She does not have anemia. If she has more questions, she can ask at her next appointment, but she should be reassured in the meantime that her results are great!  "

## 2022-01-12 ENCOUNTER — HOSPITAL ENCOUNTER (OUTPATIENT)
Dept: PHYSICAL THERAPY | Facility: REHABILITATION | Age: 27
End: 2022-01-12
Attending: FAMILY MEDICINE
Payer: COMMERCIAL

## 2022-01-12 DIAGNOSIS — M54.42 BILATERAL LOW BACK PAIN WITH LEFT-SIDED SCIATICA, UNSPECIFIED CHRONICITY: Primary | ICD-10-CM

## 2022-01-12 DIAGNOSIS — M53.3 SI (SACROILIAC) JOINT DYSFUNCTION: ICD-10-CM

## 2022-01-12 PROCEDURE — 97140 MANUAL THERAPY 1/> REGIONS: CPT | Mod: GP | Performed by: PHYSICAL THERAPIST

## 2022-01-12 PROCEDURE — 97110 THERAPEUTIC EXERCISES: CPT | Mod: GP | Performed by: PHYSICAL THERAPIST

## 2022-01-12 PROCEDURE — 97161 PT EVAL LOW COMPLEX 20 MIN: CPT | Mod: GP | Performed by: PHYSICAL THERAPIST

## 2022-01-13 NOTE — PROGRESS NOTES
[facility]    OUTPATIENT PHYSICAL THERAPY ORTHOPEDIC EVALUATION  PLAN OF TREATMENT FOR OUTPATIENT REHABILITATION  (COMPLETE FOR INITIAL CLAIMS ONLY)  Patient's Last Name, First Name, M.I.  YOB: 1995  Fan Duran    Provider s Name:  [facility]   Medical Record No.  [id number]   Start of Care Date:  01/12/22   Onset Date:  01/10/22   Type:     _X__PT   ___OT   ___SLP Medical Diagnosis:  (P) M54.42 (ICD-10-CM) - Bilateral low back pain with left-sided sciatica, unspecified chronicity     PT Diagnosis:  SI joint dysfunction   Visits from SOC:  1      _________________________________________________________________________________  Plan of Treatment/Functional Goals:  joint mobilization,manual therapy,neuromuscular re-education,ROM,strengthening,stretching           Goals  Goal Identifier: Self-management/HEP  Goal Description: Patient will be independent in self-management of condition and HEP.  Target Date: 03/23/22    Goal Identifier: Standing  Goal Description: Patient will be able to stand and work in the kitchen for 30 minutes with <3/10 pain in the lower back in order to perform household tasks.  Target Date: 03/23/22    Goal Identifier: Stairs  Goal Description: Patient will be able to maneuver stairs with <3/10 pain in the lower back in order to maneuver around her home.  Target Date: 03/23/22    Goal Identifier: Car transfer  Goal Description: Patient will be able to get into and out of a car with <3/10 pain in the lower back for improved QOL.  Target Date: 03/23/22       Therapy Frequency:  1 time/week  Predicted Duration of Therapy Intervention:  6-12    Joyce Cerna, PT, DPT, MHA                 I CERTIFY THE NEED FOR THESE SERVICES FURNISHED UNDER        THIS PLAN OF TREATMENT AND WHILE UNDER MY CARE     (Physician co-signature of this  document indicates review and certification of the therapy plan).                       Certification Date From:  (P) 01/12/22   Certification Date To:  (P) 04/11/22    Referring Provider:  Sully Barney MD    Initial Assessment        See Epic Evaluation Start of Care Date: 01/12/22 01/12/22 0800   General Information   Type of Visit Initial OP Ortho PT Evaluation   Start of Care Date 01/12/22   Referring Physician Sully Barney MD   Patient/Family Goals Statement For heal my leg   Orders Evaluate and Treat   Date of Order 01/10/22   Certification Required? Yes   Medical Diagnosis M54.42 (ICD-10-CM) - Bilateral low back pain with left-sided sciatica, unspecified chronicity   Surgical/Medical history reviewed Yes   Precautions/Limitations other (see comments)  (Patient currently pregnant)       Present Yes   Language Other  (Penelope)   Body Part(s)   Body Part(s) Lumbar Spine/SI   Presentation and Etiology   Pertinent history of current problem (include personal factors and/or comorbidities that impact the POC) The patient reports that her pain started during her second pregnancy, 4 years ago.  She does not recall any injury.  The pain increased throughout the pregnancies.  She did have an epidural after the second child, but it hasn't changed.  She gets tingling and numbness in the left leg.  Her third pregnancy was a miscarriage.  Her pain and numbness/tingling is constant.   Impairments A. Pain   Functional Limitations perform activities of daily living;perform required work activities   Symptom Location Pain in the L lower back and buttock, tingling and numbness in the L leg down to foot   How/Where did it occur From insidious onset   Onset date of current episode/exacerbation 01/10/22   Chronicity Chronic   Pain rating (0-10 point scale) Best (/10);Worst (/10)   Best (/10) 4   Worst (/10) 8   Pain quality C. Aching   Frequency of pain/symptoms A. Constant   Pain/symptoms  are: Worse during the day   Pain/symptoms exacerbated by A. Sitting;B. Walking;C. Lifting;M. Other   Pain exacerbation comment Rolling over in bed   Pain/symptoms eased by E. Changing positions;I. OTC medication(s)   Progression of symptoms since onset: Worsened   Current Level of Function   Patient role/employment history A. Employed   Employment Comments [employer]   Living environment [place]   Fall Risk Screen   Fall screen completed by PT   Have you fallen 2 or more times in the past year? No   Have you fallen and had an injury in the past year? No   Is patient a fall risk? No   Abuse Screen (yes response referral indicated)   Feels Unsafe at Home or Work/School no   Feels Threatened by Someone no   Does Anyone Try to Keep You From Having Contact with Others or Doing Things Outside Your Home? no   Physical Signs of Abuse Present no   System Outcome Measures   Outcome Measures Low Back Pain (see Oswestry and Radha)  (WILBERT: 50%)   Lumbar Spine/SI Objective Findings   Posture Increased lumbar lordosis   Flexion ROM Pain on the L, but able to reach toes   Extension ROM WNL   Right Side Bending ROM WNL   Left Side Bending ROM Pain, but WNL   Repeated Extension-Standing ROM No change   Repeated Flexion-Standing ROM No change   Lumbar ROM Comment Rotation ROM is WNL   Pelvic Screen L LE appears shorter supine and L ASIS higher supine   Hip Screen WNL, mild pain with movement   Hip Flexion (L2) Strength 4+ L, 5 R   Knee Flexion Strength 4 with pain L   Knee Extension (L3) Strength 5   Ankle Dorsiflexion (L4) Strength 5   Great Toe Extension (L5) Strength 5   Slump Test (+) L   Palpation Tenderness L hip musculature and greater trochanter   Planned Therapy Interventions   Planned Therapy Interventions joint mobilization;manual therapy;neuromuscular re-education;ROM;strengthening;stretching   Clinical Impression   Criteria for Skilled Therapeutic Interventions Met yes, treatment indicated   PT Diagnosis SI joint  dysfunction   Influenced by the following impairments Impaired strength, impaired pelvic alignment, and impaired nerve tension   Functional limitations due to impairments Standing, sitting, walking, lifting, rolling over in bed   Clinical Presentation Stable/Uncomplicated   Clinical Decision Making (Complexity) Low complexity   Therapy Frequency 1 time/week   Predicted Duration of Therapy Intervention (days/wks) 6-12   Risk & Benefits of therapy have been explained Yes   Patient, Family & other staff in agreement with plan of care Yes   Clinical Impression Comments Fan Duran is a 26 year old female who presents to PT with c/o lower back and L LE pain and numbness/tingling.  She is currently 10 weeks pregnant.  Onset of symptoms was with her second pregnancy 4 years ago.  Patient has PMH positive for asthma, overweight, and prior miscarriage in 2019.  Her pain symptoms are constant and achy in nature, and rated 4-8/10.  She is currently having difficulty with standing, sitting, walking, lifting, getting into and out of a car, and rolling over in bed.  On exam, she had uneven pelvic landmarks, (+) pubic shotgun, impaired LE strength, and (+) sciatic nn tension.  Her symptoms are most consistent with SI joint dysfunction and possible lumbar radiculopathy.  She is appropriate for skilled 1:1 PT services to address the listed impairments and return to function.   Education Assessment   Barriers to Learning Language   ORTHO GOALS   PT Ortho Eval Goals 1;2;3;4   Ortho Goal 1   Goal Identifier Self-management/HEP   Goal Description Patient will be independent in self-management of condition and HEP.   Target Date 03/23/22   Ortho Goal 2   Goal Identifier Standing   Goal Description Patient will be able to stand and work in the kitchen for 30 minutes with <3/10 pain in the lower back in order to perform household tasks.   Target Date 03/23/22   Ortho Goal 3   Goal Identifier Stairs   Goal Description Patient will be  able to maneuver stairs with <3/10 pain in the lower back in order to maneuver around her home.   Target Date 03/23/22   Ortho Goal 4   Goal Identifier Car transfer   Goal Description Patient will be able to get into and out of a car with <3/10 pain in the lower back for improved QOL.   Target Date 03/23/22   Total Evaluation Time   PT Eval, Low Complexity Minutes (45977) 30   Therapy Certification   Certification date from 01/12/22   Certification date to 04/11/22   Medical Diagnosis M54.42 (ICD-10-CM) - Bilateral low back pain with left-sided sciatica, unspecified chronicity     Joyce Cerna, PT, DPT, MHA  1/12/2022

## 2022-01-17 ENCOUNTER — HOSPITAL ENCOUNTER (OUTPATIENT)
Dept: PHYSICAL THERAPY | Facility: REHABILITATION | Age: 27
End: 2022-01-17
Payer: COMMERCIAL

## 2022-01-17 DIAGNOSIS — M54.42 BILATERAL LOW BACK PAIN WITH LEFT-SIDED SCIATICA, UNSPECIFIED CHRONICITY: Primary | ICD-10-CM

## 2022-01-17 DIAGNOSIS — M53.3 SI (SACROILIAC) JOINT DYSFUNCTION: ICD-10-CM

## 2022-01-17 LAB — SCANNED LAB RESULT: NORMAL

## 2022-01-17 PROCEDURE — 97112 NEUROMUSCULAR REEDUCATION: CPT | Mod: GP | Performed by: PHYSICAL THERAPIST

## 2022-01-18 ENCOUNTER — NURSE TRIAGE (OUTPATIENT)
Dept: FAMILY MEDICINE | Facility: CLINIC | Age: 27
End: 2022-01-18
Payer: COMMERCIAL

## 2022-01-18 VITALS — SYSTOLIC BLOOD PRESSURE: 100 MMHG | TEMPERATURE: 97.7 F | DIASTOLIC BLOOD PRESSURE: 60 MMHG | HEART RATE: 90 BPM

## 2022-01-18 NOTE — TELEPHONE ENCOUNTER
Patient presents to clinic as walk-in reporting recent onset of fatigue    Patient is 11w3d at the time of visit, complaints of fatigue while/from doing everyday things. She 'just wants to lay down' tired when cleaning, caring for other children.     She noticed it approximately  3 days ago.     Denies dizziness, shortness of breath, chest pain, other signs/symptoms of illness, known exposure to COVID    Reports she hs not had this fatigue with previous pregnancies.     RN advised about fatigue in pregnancy, offered to do vitals, if those are within normal limits, route this information to OB provider for review/recommendations as appropriate, and schedule appointment earlier than scheduled March visit.     Pulse 90  O2 98%  Temp 97.7  /60    Routing to PCP for review, scheduled visit with OB provider end of Ian Meier RN on 1/18/2022 at 5:30 PM

## 2022-02-16 ENCOUNTER — PRENATAL OFFICE VISIT (OUTPATIENT)
Dept: FAMILY MEDICINE | Facility: CLINIC | Age: 27
End: 2022-02-16
Payer: COMMERCIAL

## 2022-02-16 VITALS
HEART RATE: 82 BPM | DIASTOLIC BLOOD PRESSURE: 58 MMHG | TEMPERATURE: 98.2 F | WEIGHT: 163.5 LBS | SYSTOLIC BLOOD PRESSURE: 94 MMHG | OXYGEN SATURATION: 97 % | BODY MASS INDEX: 33.02 KG/M2

## 2022-02-16 DIAGNOSIS — Z34.90 PREGNANCY, UNSPECIFIED GESTATIONAL AGE: Primary | ICD-10-CM

## 2022-02-16 DIAGNOSIS — Z23 HIGH PRIORITY FOR 2019 NOVEL CORONAVIRUS VACCINATION: ICD-10-CM

## 2022-02-16 LAB
FASTING STATUS PATIENT QL REPORTED: YES
GLUCOSE BLD-MCNC: 77 MG/DL (ref 70–125)

## 2022-02-16 PROCEDURE — 82105 ALPHA-FETOPROTEIN SERUM: CPT | Performed by: FAMILY MEDICINE

## 2022-02-16 PROCEDURE — 99207 PR PRENATAL VISIT: CPT | Performed by: FAMILY MEDICINE

## 2022-02-16 PROCEDURE — 0064A COVID-19,PF,MODERNA (18+ YRS BOOSTER .25ML): CPT | Performed by: FAMILY MEDICINE

## 2022-02-16 PROCEDURE — 99000 SPECIMEN HANDLING OFFICE-LAB: CPT | Performed by: FAMILY MEDICINE

## 2022-02-16 PROCEDURE — 36415 COLL VENOUS BLD VENIPUNCTURE: CPT | Performed by: FAMILY MEDICINE

## 2022-02-16 PROCEDURE — 91306 COVID-19,PF,MODERNA (18+ YRS BOOSTER .25ML): CPT | Performed by: FAMILY MEDICINE

## 2022-02-16 PROCEDURE — 82947 ASSAY GLUCOSE BLOOD QUANT: CPT | Performed by: FAMILY MEDICINE

## 2022-02-16 NOTE — PROGRESS NOTES
SUBJECTIVE:   at 15w4d. Estimated Date of Delivery: Aug 6, 2022.  She is doing well. She denies nausea and vomiting. She denies abdominal pain/cramping, vaginal bleeding, leakage of fluid.   Concerns: acne on face- she never had this with previous pregnancies. She reports her back and leg pain is much improved after physical therapy- she cancelled the next sessions due to transportation and her symptoms improved. She no longer has fatigue. She is not eating as much- sometimes only one meal per day. She feels full faster.   ROS  Negative except as noted above in HPI.  OBJECTIVE:  Blood pressure 94/58, pulse 82, temperature 98.2  F (36.8  C), weight 74.2 kg (163 lb 8 oz), last menstrual period 10/21/2021, SpO2 97 %, not currently breastfeeding.  Gen: comfortable, no acute distress  See OB Vitals flowsheet.  ASSESSMENT/PLAN:  Fan was seen today for fatigue.    Diagnoses and all orders for this visit:    Pregnancy, unspecified gestational age  -     Alpha fetoprotein maternal screen; Future  -     Glucose; Future  -     Alpha fetoprotein maternal screen  -     Glucose    High priority for 2019 novel coronavirus vaccination  -     COVID-19,PF,MODERNA (18+ YRS BOOSTER .25ML); Future  -     COVID-19,PF,MODERNA (18+ YRS BOOSTER .25ML)      -IUP at 15w4d:     Prenatal labs reviewed    MSAFP discussed for neural tube screening- patient elects to do this today.    Order anatomy scan at next visit, early 1 hour GTT      Sully Barney MD

## 2022-02-18 LAB
# FETUSES US: NORMAL
AFP MOM SERPL: 0.73
AFP SERPL-MCNC: 22 NG/ML
AGE - REPORTED: 27.6 YR
CURRENT SMOKER: NO
FAMILY MEMBER DISEASES HX: NO
GA METHOD: NORMAL
GA: NORMAL WK
IDDM PATIENT QL: NO
INTEGRATED SCN PATIENT-IMP: NORMAL
SPECIMEN DRAWN SERPL: NORMAL

## 2022-03-03 ENCOUNTER — PATIENT OUTREACH (OUTPATIENT)
Dept: NURSING | Facility: CLINIC | Age: 27
End: 2022-03-03

## 2022-03-03 ENCOUNTER — PATIENT OUTREACH (OUTPATIENT)
Dept: CARE COORDINATION | Facility: CLINIC | Age: 27
End: 2022-03-03

## 2022-03-03 ENCOUNTER — TELEPHONE (OUTPATIENT)
Dept: FAMILY MEDICINE | Facility: CLINIC | Age: 27
End: 2022-03-03

## 2022-03-03 ENCOUNTER — PRENATAL OFFICE VISIT (OUTPATIENT)
Dept: FAMILY MEDICINE | Facility: CLINIC | Age: 27
End: 2022-03-03
Payer: COMMERCIAL

## 2022-03-03 VITALS
BODY MASS INDEX: 33.06 KG/M2 | SYSTOLIC BLOOD PRESSURE: 94 MMHG | HEART RATE: 100 BPM | OXYGEN SATURATION: 98 % | HEIGHT: 59 IN | TEMPERATURE: 97.9 F | DIASTOLIC BLOOD PRESSURE: 60 MMHG | RESPIRATION RATE: 16 BRPM | WEIGHT: 164 LBS

## 2022-03-03 DIAGNOSIS — Z34.90 PREGNANCY, UNSPECIFIED GESTATIONAL AGE: Primary | ICD-10-CM

## 2022-03-03 DIAGNOSIS — E66.811 CLASS 1 OBESITY WITHOUT SERIOUS COMORBIDITY IN ADULT, UNSPECIFIED BMI, UNSPECIFIED OBESITY TYPE: ICD-10-CM

## 2022-03-03 DIAGNOSIS — Z34.90 PREGNANCY, UNSPECIFIED GESTATIONAL AGE: ICD-10-CM

## 2022-03-03 DIAGNOSIS — E66.811 CLASS 1 OBESITY WITHOUT SERIOUS COMORBIDITY IN ADULT, UNSPECIFIED BMI, UNSPECIFIED OBESITY TYPE: Primary | ICD-10-CM

## 2022-03-03 LAB — GLUCOSE 1H P 50 G GLC PO SERPL-MCNC: 185 MG/DL (ref 70–129)

## 2022-03-03 PROCEDURE — 36415 COLL VENOUS BLD VENIPUNCTURE: CPT | Performed by: FAMILY MEDICINE

## 2022-03-03 PROCEDURE — 82950 GLUCOSE TEST: CPT | Performed by: FAMILY MEDICINE

## 2022-03-03 PROCEDURE — 99207 PR PRENATAL VISIT: CPT | Performed by: FAMILY MEDICINE

## 2022-03-03 NOTE — PROGRESS NOTES
"SUBJECTIVE:   at 17w5d by 7 week US. Estimated Date of Delivery: Aug 6, 2022.  She is doing well. She denies nausea and vomiting. She denies abdominal pain/cramping, vaginal bleeding, leakage of fluid. Fetal movement is present- feeling movement in the morning.   Concerns: Still fatigued with poor appetite- tends to only eat 1 meal per day. She tried to  her prenatal vitamins and albuterol inhaler but was told at the pharmacy that she has 2 insurances and she needs to get rid of one  ROS  Negative except as noted above in HPI.  OBJECTIVE:  Blood pressure 94/60, pulse 100, temperature 97.9  F (36.6  C), temperature source Oral, resp. rate 16, height 1.499 m (4' 11\"), weight 74.4 kg (164 lb), last menstrual period 10/21/2021, SpO2 98 %, not currently breastfeeding.  Gen: comfortable, no acute distress.  See OB Vitals flowsheet.  ASSESSMENT/PLAN:  Fan was seen today for prenatal care.    Diagnoses and all orders for this visit:    Pregnancy, unspecified gestational age  -     Glucose tolerance, gest screen, 1 hour; Future  -     US OB > 14 Weeks; Future    Class 1 obesity without serious comorbidity in adult, unspecified BMI, unspecified obesity type: History of prediabetes- early 1 hour GTT.  -     Glucose tolerance, gest screen, 1 hour; Future      -IUP at 17w5d:     Prenatal labs reviewed     Postpartum contraception: reviewed options today- she did not like nexplanon due to weight gain and amenorrhea- she tolerated depo and patch well- would like to use depo and then will likely want another baby after a few cycles of depo    RTC in 4 weeks or sooner with problems.    Sully Barney MD    "

## 2022-03-03 NOTE — TELEPHONE ENCOUNTER
----- Message from Sully Barney MD sent at 3/3/2022 10:16 AM CST -----  Please call patient and let her know her glucose test was abnormal- we will need to do a 3 hour glucose test in the clinic- this is a fasting test (but okay to drink water prior). I will place order- please schedule lab-only appointment

## 2022-03-03 NOTE — PROGRESS NOTES
Clinic Care Coordination Contact  Community Health Worker Initial Outreach    CHW Initial Information Gathering:  Referral Source: PCP  Preferred Hospital: [facility]  [phone]  Preferred Urgent Care: [facility] [place], [phone]  Current living arrangement:: I live in a private home with spouse  Type of residence:: Private home - stairs  Community Resources: None  Supplies Currently Used at Home: None  Equipment Currently Used at Home: none  Informal Support system:: Family  No PCP office visit in Past Year: No  Transportation means:: Accessible car, Family, Friend  CHW Additional Questions  If ED/Hospital discharge, follow-up appointment scheduled as recommended?: N/A  Medication changes made following ED/Hospital discharge?: N/A  MyChart active?: No  Patient agreeable to assistance with activating MyChart?: No    Patient accepts CC: Yes. Patient scheduled for assessment with CCC  on 5/02/2022 at 9:00 AM. Patient noted desire to discuss pregnancy resources such as PHN, car seat and insurance. .     Financial Resource Worker Screening    [facility] Benefits  Is patient requesting help applying for [facility] benefits?: No    [place]:  [place] Yes  Is this an insurance renewal?: No  Is this a new insurance application request?: Yes  Have you recently applied for insurance?: No  How many people in your household? : 4  Do you file taxes?: Yes  How many dependents do you claim?: 2  What is the monthly gross income for the household (wages, social security, workers comp, and pension)? : 3500    Any other information for the FRW?: According to [facility] pharmacy it unable to bill to [employer] insurance when patient tried to refill or get rx for med and it stated patient's health insurance is not [employer].    Care Coordination team will tell patient:   Thank you for answering all the questions, based on screening questions, our  Financial Resource Worker will reach out to you with additional questions and next steps.      [place] Department of [place]: [place] Health [place] Programs Eligibility Response (271)    SUBSCRIBER INFORMATION  Date of Service Subscriber ID Subscriber Name Birthdate Age Gender  03/03/2022 [id number] LUIS FELIPE PEREIRA 1995 27 FEMALE     Address  [address] [place] [address]  [id number]     PROVIDER INFORMATION  Provider ID Submitter Transaction ID Provider Name Taxonomy Code Qualifier Taxonomy Code  [id number] xx [facility]  This subscriber has eligibility for [place]: Medical Assistance.  Elig Type AA: Parent of a dependent child  Eligibility Begin Date: 03/01/2020  Eligibility End Date: --/--/----  This subscriber is eligible for the following service types: Medical Care ,  Chiropractic ,  Dental Care ,  Hospital ,  Hospital - Inpatient ,  Hospital - Outpatient ,  Emergency Services ,  Pharmacy ,  Professional (Physician) Visit - Office ,  Vision (Optometry) ,  Mental Health ,  Urgent Care  Prepaid Health Plan  This subscriber receives MA12 - Prepaid Medical Assistance Program (PMAP) delivered through [employer]. The phone number is [phone].    Other Eligibility Information  No Special Transportation.  No Hospice.  Refer to Health Care Programs and Services Overview of the [facility] Provider Manual for a list of covered services.     Waivers  None    Subscriber Responsibility Information  An office visit copay of 3 dollars may exist for this subscriber.  A copay of 3.50 dollars for Non-Emergency ER visits may exist for this subscriber.    Restricted Recipient Program  None     Medicare  None     Other Insurance  INSURANCE COVERAGE  Carrier ID: [id number]  Coverage Types: 05: Pharmacy - Copay per prescription (Provider must bill insurance first.)  Carrier Name: [employer]  Carrier Billing Address: CLAIMS  [address] [address]  [place], [place],  [address]  Carrier  Contact Phone: [phone]  Group Number: [id number]  Policy Number: [id number]  Policyholder Name: LUIS FELIPE PEREIRA

## 2022-03-04 NOTE — TELEPHONE ENCOUNTER
Clinic Care Coordination Contact  Program: Health Insurance   County:  [facility] Case #:  County Worker:   Mnsure #:   Subscriber #:   Renewal:  Date Applied:     FRW Outreach:   3/4/22: Outreach attempted x 1. Left message on voicemail with call back information and requested return call.  Plan: FRW will call again within one week.     Health Insurance:      Referral/Screening:  Financial Resource Worker Screening     [facility] Benefits  Is patient requesting help applying for Counts include [facility] at the [facility] benefits?: No     [place]:  [place] Yes  Is this an insurance renewal?: No  Is this a new insurance application request?: Yes  Have you recently applied for insurance?: No  How many people in your household? : 4  Do you file taxes?: Yes  How many dependents do you claim?: 2  What is the monthly gross income for the household (wages, social security, workers comp, and pension)? : 3500     Any other information for the FRW?: According to [facility] pharmacy it unable to bill to [employer] insurance when patient tried to refill or get rx for med and it stated patient's health insurance is not [employer].  Goals Addressed:

## 2022-03-07 NOTE — TELEPHONE ENCOUNTER
The patient verbalizes understanding of provider/CSS instructions for follow-up and continued care per provider message.     Lab scheduled for 3/8/2022.

## 2022-03-08 ENCOUNTER — TELEPHONE (OUTPATIENT)
Dept: FAMILY MEDICINE | Facility: CLINIC | Age: 27
End: 2022-03-08

## 2022-03-08 ENCOUNTER — PATIENT OUTREACH (OUTPATIENT)
Dept: CARE COORDINATION | Facility: CLINIC | Age: 27
End: 2022-03-08

## 2022-03-08 ENCOUNTER — LAB (OUTPATIENT)
Dept: LAB | Facility: CLINIC | Age: 27
End: 2022-03-08
Payer: COMMERCIAL

## 2022-03-08 ENCOUNTER — PRENATAL OFFICE VISIT (OUTPATIENT)
Dept: FAMILY MEDICINE | Facility: CLINIC | Age: 27
End: 2022-03-08
Payer: COMMERCIAL

## 2022-03-08 VITALS
SYSTOLIC BLOOD PRESSURE: 90 MMHG | BODY MASS INDEX: 32.66 KG/M2 | HEIGHT: 59 IN | OXYGEN SATURATION: 97 % | WEIGHT: 162 LBS | HEART RATE: 103 BPM | TEMPERATURE: 98.1 F | RESPIRATION RATE: 20 BRPM | DIASTOLIC BLOOD PRESSURE: 60 MMHG

## 2022-03-08 DIAGNOSIS — Z34.82 ENCOUNTER FOR SUPERVISION OF OTHER NORMAL PREGNANCY IN SECOND TRIMESTER: Primary | ICD-10-CM

## 2022-03-08 DIAGNOSIS — R63.0 POOR APPETITE: ICD-10-CM

## 2022-03-08 DIAGNOSIS — Z34.90 PREGNANCY, UNSPECIFIED GESTATIONAL AGE: ICD-10-CM

## 2022-03-08 DIAGNOSIS — E66.811 CLASS 1 OBESITY WITHOUT SERIOUS COMORBIDITY IN ADULT, UNSPECIFIED BMI, UNSPECIFIED OBESITY TYPE: ICD-10-CM

## 2022-03-08 DIAGNOSIS — F43.9 STRESS: ICD-10-CM

## 2022-03-08 LAB
GESTATIONAL GTT 1 HR POST DOSE: 214 MG/DL (ref 60–179)
GESTATIONAL GTT 2 HR POST DOSE: 181 MG/DL (ref 60–154)
GESTATIONAL GTT 3 HR POST DOSE: 131 MG/DL (ref 60–139)
GLUCOSE P FAST SERPL-MCNC: 104 MG/DL (ref 60–94)

## 2022-03-08 PROCEDURE — 99207 PR PRENATAL VISIT: CPT | Performed by: FAMILY MEDICINE

## 2022-03-08 PROCEDURE — 82951 GLUCOSE TOLERANCE TEST (GTT): CPT

## 2022-03-08 PROCEDURE — 36415 COLL VENOUS BLD VENIPUNCTURE: CPT

## 2022-03-08 PROCEDURE — 82952 GTT-ADDED SAMPLES: CPT

## 2022-03-08 NOTE — TELEPHONE ENCOUNTER
Clinic Care Coordination Contact  Program: Health Insurance   County:  [facility] Case #:  [facility] Worker:   Anayeliure #:   Subscriber #:   Renewal:  Date Applied:     FRW Outreach:   3/8/22:Outreach attempted x 2. Left message on voicemail indicating last outreach attempt.   Plan: FRW will send an unreachable letter and remove from panel. Patient can be referred back to FRW.      3/4/22: Outreach attempted x 1. Left message on voicemail with call back information and requested return call.  Plan: FRW will call again within one week.     Health Insurance:      Referral/Screening:  Financial Resource Worker Screening     [facility] Benefits  Is patient requesting help applying for [facility] benefits?: No     Insurance:  [place] Yes  Is this an insurance renewal?: No  Is this a new insurance application request?: Yes  Have you recently applied for insurance?: No  How many people in your household? : 4  Do you file taxes?: Yes  How many dependents do you claim?: 2  What is the monthly gross income for the household (wages, social security, workers comp, and pension)? : 3500     Any other information for the FRW?: According to [facility] pharmacy it unable to bill to [employer] insurance when patient tried to refill or get rx for med and it stated patient's health insurance is not [employer].  Goals Addressed:

## 2022-03-08 NOTE — PATIENT INSTRUCTIONS
Patient Education     Healthy Eating Habits During Pregnancy  It s important to develop healthy eating habits while you are pregnant, for you as well as for your baby. Here are some ways to stay healthy.  Aim for a healthy weight  A slow, steady rate of weight gain is often best. After the first trimester, you may gain about a pound (0.45 kg) a week. If you were overweight before pregnancy, you need to gain fewer pounds (kilograms). Your healthcare provider can give you a healthy weight goal for your pregnancy.  Don t diet  Now is not the time to diet. You may not get enough of the nutrients you and your baby need. Instead, learn how to be a healthy eater. Start by doing it for your baby. Soon, you may do it for yourself.  Vitamins and supplements  Talk with your healthcare provider about taking these and other prenatal vitamins and supplements.    Iron makes the extra blood you need now.    Calcium and vitamin D help build and keep strong bones.    Folic acid helps prevent certain birth defects.    Iodine helps the thyroid work right.    Some vitamins may not be safe to take. Your healthcare provider will tell you which ones to avoid.  Fluids  Drink at least 8 to 10 cups of fluid daily. Your baby needs fluids. Fluids also decrease constipation, flush out toxins and waste, limit swelling, and help prevent bladder infections. Water is best. Other good choices are:    Water or seltzer water with a slice of lemon or lime (These can also help ease an upset stomach.)    Clear soups that are low in salt    Low-fat or fat-free milk, soy or rice milk with calcium added    Popsicles or gelatin  Things to avoid  Some things might harm your growing baby. Don t eat or drink:    Alcohol    Unpasteurized dairy foods and juices    Raw or undercooked meat, poultry, fish, or eggs    Unwashed fruits and vegetables    Prepared meats, like deli meats or hot dogs, unless heated until steaming hot    Fish that are high in mercury, like  shark, swordfish, sean mackerel, tilefish, and albacore tuna  Things to limit  Ask your healthcare provider whether it s safe to eat or drink:    Caffeine    Artificial sweeteners    Organ meats    Certain types of fish    Fish and shellfish that contain mercury in lower amounts, like shrimp, canned light tuna, salmon, pollock, and catfish  Gustavo last reviewed this educational content on 2/1/2018 2000-2021 [employer]. All rights reserved. This information is not intended as a substitute for professional medical care. Always follow your healthcare professional's instructions.

## 2022-03-08 NOTE — TELEPHONE ENCOUNTER
Reason for Call:  Request for results: Glucose result    Name of test or procedure: Gestational Glucose Tolerance Testing, 3 Hour        Date of test of procedure: 3/8/22    Location of the test or procedure: [facility]    OK to leave the result message on voice mail or with a family member? YES    Phone number Patient can be reached at:  Cell number on file:    Telephone Information:   Mobile [phone]       Additional comments: Patient called would like for Dr. Berg team to contact pt back regarding 3hr glucose result. Please contact patient.    Call taken on 3/8/2022 at 4:27 PM by Jodee Marcano

## 2022-03-08 NOTE — PROGRESS NOTES
Assessment/Plan:     Encounter for supervision of other normal pregnancy in second trimester  Stress  Poor appetite  27 year old  at 18w3d here with concerns for fetal wellbeing due to poor oral intake.  She has not hardly been eating or drinking anything in the last couple days due to significant stress reaction to her father's medical condition.  I was able to reassure her that everything seems fine with the baby at this time with normal fetal heart tones.  However, she has lost 2 pounds in the last 5 days and it sounds like she is likely dehydrated by her urine description.  I have strongly encouraged her to try to eat and drink small frequent amounts for her own health as well as the health of the baby.  She is committed to doing this.  I offered to get her in sooner than her next appointment for follow-up, but she is comfortable keeping her current appointment and calling if she is having more trouble.    Is having her 3-hour glucose today; defer management of that result to her primary FMOB doctor.       Return in about 23 days (around 3/31/2022) for prenatal care, with your regular doctor.  Subjective:   Fan Duran is a 27 year old  at 18w3d who is seen for routine prenatal care.   She was here initially for a lab appointment but had some questions about whether her baby was okay, so I also saw her for prenatal visit.  Her usual family medicine OB doctor is Dr. Barney, who wasn't in clinic today.    Patient has not been hardly eating or drinking anything for the last couple of days and she is worried that it could be affecting her baby.  Per her report, her dad has a serious medical condition and she is really worried about him and that is why she cannot eat.  She is hardly been eating or drinking anything.  She was able to force herself to eat a little bit yesterday at the encouragement of her family.  She notes that her urine is quite concentrated.  She is not having nausea, just  "upset and cannot eat.    She has not yet felt quickening  Objective   Vitals: BP 90/60   Pulse 103   Temp 98.1  F (36.7  C) (Oral)   Resp 20   Ht 1.499 m (4' 11\")   Wt 73.5 kg (162 lb)   LMP 10/21/2021 (Approximate)   SpO2 97%   BMI 32.72 kg/m     Exam: Per flowsheet     "

## 2022-03-09 NOTE — TELEPHONE ENCOUNTER
Chart reviewed. Please review findings below.     Component      Latest Ref Rng & Units 3/8/2022   Gestational GTT 3 Hr Post Dose      60 - 139 mg/dL 131

## 2022-03-10 DIAGNOSIS — O24.419 GESTATIONAL DIABETES MELLITUS (GDM), ANTEPARTUM, GESTATIONAL DIABETES METHOD OF CONTROL UNSPECIFIED: Primary | ICD-10-CM

## 2022-03-10 RX ORDER — LANCETS
EACH MISCELLANEOUS
Qty: 200 EACH | Refills: 6 | Status: SHIPPED | OUTPATIENT
Start: 2022-03-10 | End: 2022-10-05

## 2022-03-10 RX ORDER — GLUCOSAMINE HCL/CHONDROITIN SU 500-400 MG
CAPSULE ORAL
Qty: 100 EACH | Refills: 3 | Status: SHIPPED | OUTPATIENT
Start: 2022-03-10 | End: 2022-10-05

## 2022-03-28 ENCOUNTER — HOSPITAL ENCOUNTER (OUTPATIENT)
Dept: ULTRASOUND IMAGING | Facility: HOSPITAL | Age: 27
Discharge: HOME | End: 2022-03-28
Attending: FAMILY MEDICINE | Admitting: FAMILY MEDICINE
Payer: COMMERCIAL

## 2022-03-28 DIAGNOSIS — Z34.90 PREGNANCY, UNSPECIFIED GESTATIONAL AGE: ICD-10-CM

## 2022-03-28 PROCEDURE — 76805 OB US >/= 14 WKS SNGL FETUS: CPT

## 2022-03-29 ENCOUNTER — ALLIED HEALTH/NURSE VISIT (OUTPATIENT)
Dept: EDUCATION SERVICES | Facility: CLINIC | Age: 27
End: 2022-03-29
Attending: FAMILY MEDICINE
Payer: COMMERCIAL

## 2022-03-29 DIAGNOSIS — O24.419 GDM (GESTATIONAL DIABETES MELLITUS): Primary | ICD-10-CM

## 2022-03-29 PROCEDURE — G0108 DIAB MANAGE TRN  PER INDIV: HCPCS | Mod: AE | Performed by: DIETITIAN, REGISTERED

## 2022-03-29 NOTE — PATIENT INSTRUCTIONS
Instructions:     - Continue to check blood sugar 4 times per day. Before breakfast and 1 hour after meals.         Blood sugar goals:       Before breakfast: less 95      1 hour after meals: less 140      2 hours after meals: less 120     -  Continue to eat 3 meals and 3 snacks per day according to the meal plan.   Meal plan:   Limit rice to 1 cup per meal.  Incorporate protein at every meal and snack.  Follow consistent carbohydrate meal plan, eat carbohydrates and protein/fat at all meals/snacks.     - Try to eat within one hour of waking up in the morning. That should help manage appetite.     - Include more protein and vegetables at meals to help stabilize blood sugar.      - Stay active after meals/snacks to manage blood sugars.     - Next appointment:4/13     Call Diabetes Care or send a JANZZ message if you have 3 blood sugars above target in one week.     Diabetes Care: [phone]

## 2022-03-29 NOTE — PROGRESS NOTES
Diabetes Self-Management Education & Support      SUBJECTIVE/OBJECTIVE:  Presents for education related to gestational diabetes.    Accompanied by: Self, Daughter (pt presents with her 8 y/o daughter)  Diabetes management related comments/concerns: hx of prediabetes  Gestational weeks: 21w3d  Hospital planned for delivery: [facility] Next OB Visit Date: 22  Number of previous pregnancies: 2  Had any babies over 9 lbs: No  Previously had Gestational Diabetes: No  Have you ever had thyroid problems or taken thyroid medication?: No  Heart disease, mitral valve prolapse or rheumatic fever?: No  Hypertension : No  High Cholesterol: No  High Triglycerides: No  Do you use tobacco products?: No  Do you drink beer, wine or hard liquor?: No    Cultural Influences/Ethnic Background:  Not  or       Estimated Date of Delivery: Aug 6, 2022    1 hour OGTT  Lab Results   Component Value Date    GLU1 185 (H) 2022         3 hour OGTT    Fasting  Lab Results   Component Value Date    GTTGF 104 (H) 2022       1 hour  Lab Results   Component Value Date    GTTG1 214 (H) 2022       2 hour  Lab Results   Component Value Date    GTTG2 181 (H) 2022       3 hour  Lab Results   Component Value Date    GTTG3 131 2022       Lifestyle and Health Behaviors:  Pre-pregnancy weight (lbs): 172  Exercise:: Unable to exercise (walks around in the house)  Meals include: Breakfast, Dinner (rice, soup, meat, veg)  Beverages: Water, Energy drinks (energy drink and juice on a regular basis)  How many servings of fruits/vegetables per day: 4  Pre-tatyana vitamin?: No (Has not been taking it since end  - could not get a refill)  Supplements?: No  Experiencing nausea?: No  Experiencing heartburn?: No    Healthy Coping:  Emotional response to diabetes: Other (Pt's dad is in the hospital currently and she has been very stressed)  Informal Support system:: Family (, mom, cousin, aunt)  Stage of change:  PREPARATION (Decided to change - considering how)    Current Management:       ASSESSMENT:    Initial GDM visit. Also present is patient's 8 y/o daughter.  This is patient's 3rd pregnancy. It's a girl.  Has kids ages 9 and 4. No hx of GDM   Fam hx: maternal grandmother.  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  High stress levels - dad is currently in the hospital with a serious medical condition.  Reports good family support - , mom, cousin, aunt    Pt shares that she has not been able to fill her rx for glucose meter, testing supplies and prenatal vitamins  I called [facility] at  [phone] (and spoke with the pharmacist, Breanna) to discuss - I was told that apparently pt also has some other plan/commercial plan on file besides [facility] (through Medicaid). And that it would have to be either the pt or her  that would need to call and get it removed so patient can start filling her rx.     In the interim, I provided a sample meter and testing supplies worth 1 week to the pt today, but we would need to help sort this out ASAP so pt can continue to test 4x/day.   Have also communicated with Christy Hernandez CCC about this.    INTERVENTION:  Patient was instructed on Accu-Chek Guide Me meter and was able to provide an accurate return demonstration. Patient's blood glucose reading today was 75 mg/dL.  Extra sample needles and test strips were provided.    Educational topics covered today:  GDM diagnosis, pathophysiology, Risks and Complications of GDM, Means of controlling GDM, Using a Blood Glucose Monitor, Blood Glucose Goals, Logging and Interpreting Glucose Results, Ketone Testing, When to Call a Diabetes Educator or OB Provider, Healthy Eating During Pregnancy, Counting Carbohydrates, Meal Planning for GDM, and Physical Activity    Educational materials provided today:   [facility] Understanding Gestational Diabetes  GDM Log Book  Accu-Chek Guide Me meter kit    Pt verbalized  understanding of concepts discussed and recommendations provided today.     PLAN:    Provided a sample meter and testing supplies worth 1 week to the pt today, but we would need to help sort things out with coverage ASAP so pt can continue to test 4x/day.   Have also communicated with Christy Hernandez CCC about this.    Check glucose 4 times daily, before breakfast and 1 hour after each meal.     Physical activity recommended: at least 30 mins daily as able/safe. Staying active for 10-15 mins after meals/snacks helps lower BG.    Meal plan:   Limit rice to 1 cup per meal.  Incorporate protein at every meal and snack.  Eat 3 small meals + 2-3 healthy snacks daily, including a bedtime snack.  Follow consistent CHO meal plan, eat CHO and protein/fat at all meals/snacks.  Call/e-mail/[facility] message diabetes educator if 3 or more blood sugars are above the goal in 1 week or with questions/concerns.    F/u:4/13  OBGYN:3/31  Instructed pt to always bring her meter, BG log to all clinic visits - pt expressed understanding    Time Spent: 60 minutes  Encounter Type: Individual    Any diabetes medication dose changes were made via the CDE Protocol and Collaborative Practice Agreement with the patient's referring provider. A copy of this encounter was shared with the provider.

## 2022-03-31 ENCOUNTER — PRENATAL OFFICE VISIT (OUTPATIENT)
Dept: FAMILY MEDICINE | Facility: CLINIC | Age: 27
End: 2022-03-31
Payer: COMMERCIAL

## 2022-03-31 VITALS
WEIGHT: 166 LBS | RESPIRATION RATE: 16 BRPM | OXYGEN SATURATION: 97 % | HEIGHT: 59 IN | SYSTOLIC BLOOD PRESSURE: 102 MMHG | DIASTOLIC BLOOD PRESSURE: 64 MMHG | TEMPERATURE: 98 F | HEART RATE: 100 BPM | BODY MASS INDEX: 33.47 KG/M2

## 2022-03-31 DIAGNOSIS — Z34.90 PREGNANCY, UNSPECIFIED GESTATIONAL AGE: Primary | ICD-10-CM

## 2022-03-31 DIAGNOSIS — O24.419 GESTATIONAL DIABETES MELLITUS (GDM), ANTEPARTUM, GESTATIONAL DIABETES METHOD OF CONTROL UNSPECIFIED: ICD-10-CM

## 2022-03-31 PROCEDURE — 99207 PR PRENATAL VISIT: CPT | Performed by: FAMILY MEDICINE

## 2022-03-31 NOTE — PROGRESS NOTES
"SUBJECTIVE:   at 21w5d. Estimated Date of Delivery: Aug 6, 2022.  She denies nausea and vomiting. She denies abdominal pain/cramping, vaginal bleeding, leakage of fluid. Fetal movement is now present.   Concerns: Poor appetite- related to grief and stress- her father is dying of cancer. 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 She doesn't feel like eating much. Has started checking sugars four times daily  Fastings slightly elevated at 109, 115  2 post-prandial elevations: 146, 142  Her mother cooks mostly- and she mixes white and brown rice    ROS  Negative except as noted above in HPI.  OBJECTIVE:  Blood pressure 102/64, pulse 100, temperature 98  F (36.7  C), temperature source Oral, resp. rate 16, height 1.499 m (4' 11\"), weight 75.3 kg (166 lb), last menstrual period 10/21/2021, SpO2 97 %, not currently breastfeeding.  Gen: comfortable, no acute distress.  See OB Vitals flowsheet.  ASSESSMENT/PLAN:  Fan was seen today for prenatal care.    Diagnoses and all orders for this visit:    Pregnancy, unspecified gestational age    Gestational diabetes mellitus (GDM), antepartum, gestational diabetes method of control unspecified: Met with diabetes education- has started checking sugars. She did have 2 slightly elevated fasting levels and also two elevated post-prandial readings- she is under a great deal of stress with caring for her father, who is dying from cancer.  Recommended to continue checking sugars four times daily- try switching from white to brown rice. Also possible her sugars are elevated 2/2 stress.      -IUP at 21w5d:     Prenatal labs reviewed     RTC in 4 weeks or sooner with problems. Hemoglobin, syphilis at next visit      Sully Barney MD    "

## 2022-04-01 DIAGNOSIS — Z71.89 COMPLEX CARE COORDINATION: Primary | ICD-10-CM

## 2022-04-05 ENCOUNTER — PATIENT OUTREACH (OUTPATIENT)
Dept: NURSING | Facility: CLINIC | Age: 27
End: 2022-04-05
Attending: FAMILY MEDICINE
Payer: COMMERCIAL

## 2022-04-05 DIAGNOSIS — Z71.89 COMPLEX CARE COORDINATION: ICD-10-CM

## 2022-04-05 ASSESSMENT — ACTIVITIES OF DAILY LIVING (ADL): DEPENDENT_IADLS:: INDEPENDENT

## 2022-04-05 NOTE — LETTER
[facility]  Patient Centered Plan of Care  About Me:        Patient Name:  Fan Duran    YOB: 1995  Age:         27 year old   Maya MRN:    [id number] Telephone Information:  Home Phone [phone]   Mobile [phone]       Address:  [address] [place] [address] Email address:  [email or web address]      Emergency Contact(s)    Name Relationship Lgl Grd Work Phone Home Phone Mobile Phone   1DONN URBANO Cousin    [phone]           Primary language:  English     needed? No   [employer]:  [phone] op. 1  Other communication barriers:None    Preferred Method of Communication:     Current living arrangement: I live in a private home with spouse    Mobility Status/ Medical Equipment: Independent        Health Maintenance  Health Maintenance Reviewed: Due/Overdue (declined today.)      My Access Plan  Medical Emergency 911   Primary Clinic Line   -     24 Hour Appointment Line [phone] or  [phone] ([phone]) (toll-free)   24 Hour Nurse Line [phone] (toll-free)   Preferred Urgent Care [facility] [phone]     Preferred Hospital [facility]  [phone]     Preferred Pharmacy [id number] - [place], [place] - [address]     Behavioral Health Crisis Line The National Suicide Prevention Lifeline at 1-320.992.1085 or 911             My Care Team Members  Patient Care Team       Relationship Specialty Notifications Start End    Gloria Berg MD PCP - General Family Practice  9/27/19     Phone: [phone] Fax: [phone]         [address] [place] [address]    Angela Ho, PharmD Pharmacist Pharmacist  11/12/19     Phone: [phone]          [facility] [address] [place] [address]    Sully Barney MD Assigned PCP   1/9/22     Phone: [phone] Fax: [phone]          [address] [place] [address]    Christy Hernandez, RN Lead Care Coordinator Primary Care - CC Admissions 4/5/22             My Care Plans  Self Management and Treatment Plan  Goals and (Comments)  Goals        General     1. Med refill (pt-stated)      Notes - Note created  4/5/2022  9:38 AM by Christy Hernandez, RN       Goal Statement: I would like CCRN to assist me with med refill the next 30 days.     Date goal set: 4/5/2022  Barriers: language barrier  Strengths: motivated to attend PCP appt  Date to Achieve By: 5/5/2022  Patient expressed understanding of goal: Yes    Action steps to achieve this goal:  1. I will answer my phone when I am contacted by CCRN.  3. I will follow up with CCC regarding this goal at each outreach until it is completed.                  Action Plans on File:                       Advance Care Plans/Directives Type:   No data recorded    My Medical and Care Information  Problem List   Patient Active Problem List   Diagnosis     Pregnancy, unspecified gestational age     Class 1 obesity without serious comorbidity in adult, unspecified BMI, unspecified obesity type     Gestational diabetes      Current Medications and Allergies:  See printed Medication Report.    Care Coordination Start Date: No linked episodes   Frequency of Care Coordination: 6 weeks     Form Last Updated: 04/05/2022

## 2022-04-05 NOTE — LETTER
[facility] CARE COORDINATION    April 5, 2022    Fan Duran  [address] [place] [address]      Dear Fan,    I am a clinic care coordinator who works with Gloria Berg MD . I wanted to thank you for spending the time to talk with me.  Below is a description of clinic care coordination and how I can further assist you.      The clinic care coordination team is made up of a registered nurse,  and community health worker who understand the health care system. The goal of clinic care coordination is to help you manage your health and improve access to the health care system in the most efficient manner. The team can assist you in meeting your health care goals by providing education, coordinating services, strengthening the communication among your providers and supporting you with any resource needs.    Please feel free to contact me and the Community Health Worker at [phone]   with any questions or concerns. We are focused on providing you with the highest-quality healthcare experience possible and that all starts with you.     Sincerely,     Christy Hernandez RN    Enclosed: I have enclosed a copy of the Patient Centered Plan of Care. This has helpful information and goals that we have talked about. Please keep this in an easy to access place to use as needed.

## 2022-04-05 NOTE — PROGRESS NOTES
Clinic Care Coordination Contact    Clinic Care Coordination Contact  OUTREACH    Referral Information:  Referral Source: PCP    Primary Diagnosis: Other (include Comment box) (medication refill concerns)    Chief Complaint   Patient presents with     Clinic Care Coordination - Follow-up     Clinic Utilization  Difficulty keeping appointments:: No  Compliance Concerns: Yes  No-Show Concerns: No  No PCP office visit in Past Year: No  Utilization    Hospital Admissions  0             ED Visits  0             No Show Count (past year)  2                Current as of: 4/5/2022  2:09 AM            Clinical Concerns:  CCRN completed an assessment today but only able to address glucometer and med refill issues. Patient was driving to attend meeting with family because he dad passed away with cancer yesterday. Patient states she was able to  her glucometer last week but she has to pay $29 and she was told she needs to pay out of pocket for her prenatal as well. CCRN and patient spoke with [employer]  last Friday to remove 3rd party insurance listed on her insurance. [employer] representative spoke with [facility] last Friday to resolve this temporary and she will submit permanent request to remove 3rd party insurance list under patient's insurance and should take up to 2 weeks but in the mean time patient should be able to  her prescription without issues.     Per patient, she was still had to pay out of pocket when she picked up her glucometer last week and chose not to fill her prenatal because she has pay out of pocket.     CCRN spoke with [facility] staff today. Was told that patient has 3rd party insurance but CCRN explained to them what [employer] told us on Friday then they tried to run prenatal again. Was told that it went through this time but getting rejection stating that the brand that ordered was covered by insurance so they need to find the brand that covers  by insurance and will reach out to patient.     Patient is currently pregnant with her 4rd child and due on 8/6/2022. CCRN will follow up with patient the next 30 days to make sure she's able to  all her meds without issues and due to recent passed away of her dad yesterday, CCRN would like to be available for support if needed.       Pain  Pain (GOAL):: No  Health Maintenance Reviewed: Due/Overdue  (declined today.)  Clinical Pathway: None    Medication Management:  Medication review status: Medications reviewed and no changes reported per patient.             Functional Status:  Dependent ADLs:: Independent  Dependent IADLs:: Independent  Bed or wheelchair confined:: No  Mobility Status: Independent  Fallen 2 or more times in the past year?: No  Any fall with injury in the past year?: No    Living Situation:  Current living arrangement:: I live in a private home with spouse  Type of residence:: Private [place] [facility] [place] [facility]\""    Lifestyle & Psychosocial Needs:    Social Determinants of Health     Tobacco Use: Low Risk      Smoking Tobacco Use: Never Smoker     Smokeless Tobacco Use: Never Used   Alcohol Use: Not on file   Financial Resource Strain: Not on file   Food Insecurity: Not on file   Transportation Needs: Not on file   Physical Activity: Not on file   Stress: Not on file   Social Connections: Not on file   Intimate Partner Violence: Not on file   Depression: Not at risk     PHQ-2 Score: 0   Housing Stability: Not on file     Diet:: Diabetic diet  Inadequate nutrition (GOAL):: No  Tube Feeding: No  Inadequate activity/exercise (GOAL):: No  Significant changes in sleep pattern (GOAL): No  Transportation means:: Regular car     Yarsanism or spiritual beliefs that impact treatment:: No  Mental health DX:: No  Mental health management concern (GOAL):: No  Chemical Dependency Status: No Current Concerns  Informal Support system:: Children, Mallory based, Family, Friends      Resources and Interventions:  Current  Resources:      Community Resources: County Worker, None  Supplies Currently Used at Home: Diabetic Supplies  Equipment Currently Used at Home: none  Employment Status: employed full-time     Advance Care Plan/Directive  Advanced Care Plans/Directives on file:: No  Advanced Care Plan/Directive Status: Declined Further Information    Referrals Placed: None    The patient consented via Verbal consent to have contact information and resources sent via text in an unencrypted manner.     Goals:   Goals        General     1. Med refill (pt-stated)      Notes - Note created  4/5/2022  9:38 AM by Christy Hernandez RN       Goal Statement: I would like CCRN to assist me with med refill the next 30 days.     Date goal set: 4/5/2022  Barriers: language barrier  Strengths: motivated to attend PCP appt  Date to Achieve By: 5/5/2022  Patient expressed understanding of goal: Yes    Action steps to achieve this goal:  1. I will answer my phone when I am contacted by CCRN.  3. I will follow up with CCC regarding this goal at each outreach until it is completed.                 Outreach Frequency: 6 weeks  Future Appointments              In 1 week Joi Mcknight RD [facility]    In 3 weeks Sully Barney MD [facility]    In 1 month Sully Barney MD [facility]    In 1 month Sully Barney MD [facility]    In 2 months Sully Barney MD [facility]    In 2 months Sully Barney MD [facility]    In 3 months Vicky Ortiz MD [facility]    In 3 months Vicky Ortiz MD [facility]    In 3 months Sully Barney MD [facility]    In 3 months Sully Barney MD [facility]    In 4 months Sully Barney MD [facility]          Plan:   1) Patient will  her prental by next week.   2) CCRN will follow up in 4 weeks and plan to graduate once she's able to  all her prescriptions.

## 2022-04-13 ENCOUNTER — ALLIED HEALTH/NURSE VISIT (OUTPATIENT)
Dept: EDUCATION SERVICES | Facility: CLINIC | Age: 27
End: 2022-04-13
Payer: COMMERCIAL

## 2022-04-13 ENCOUNTER — TELEPHONE (OUTPATIENT)
Dept: EDUCATION SERVICES | Facility: CLINIC | Age: 27
End: 2022-04-13

## 2022-04-13 DIAGNOSIS — O24.419 GDM (GESTATIONAL DIABETES MELLITUS): Primary | ICD-10-CM

## 2022-04-13 PROCEDURE — G0108 DIAB MANAGE TRN  PER INDIV: HCPCS | Mod: AE | Performed by: DIETITIAN, REGISTERED

## 2022-04-13 NOTE — TELEPHONE ENCOUNTER
Pt has persistently had elevated fasting blood glucose. She understands the potential need for insulin but wants to give it another few days to work on diet and activity and incorporate a HS snack. We would need to review her BG next week to assess for insulin start but I don't have any openings - please call pt to schedule her f/u appointment with any available educator.     Thanks   Joi Mcknight RDN, ALF, [facility]  Diabetes Care and Education

## 2022-04-13 NOTE — PROGRESS NOTES
Diabetes Self-Management Education & Support    SUBJECTIVE/OBJECTIVE:  Presents for education related to gestational diabetes.    Accompanied by: Self  Diabetes management related comments/concerns: hx of prediabetes  Gestational weeks: 21w3d  Next OB Visit Date: 22  Number of previous pregnancies: 3  Had any babies over 9 lbs: No  Previously had Gestational Diabetes: No  Have you ever had thyroid problems or taken thyroid medication?: No  Heart disease, mitral valve prolapse or rheumatic fever?: No  Hypertension : No  High Cholesterol: No  High Triglycerides: No  Do you use tobacco products?: No  Do you drink beer, wine or hard liquor?: No    Cultural Influences/Ethnic Background:  No      LMP 10/21/2021 (Approximate)     At 23 weeks gestation.    Estimated Date of Delivery: Aug 6, 2022    Blood Glucose/Ketone Log:   Date Fasting Post Breakfast Post Lunch Post Supper    98 97 99 118    106 82 111 91   4/10 107 98 138 147 (rice, cookies, noodles)    111 96 117 112    109 -- 129 94    117 104 104 93    110 109 109 101       Lifestyle and Health Behaviors:  Pre-pregnancy weight (lbs): 172  Exercise:: Unable to exercise (walks around in the house)  Meals include: Breakfast, Dinner, Lunch (meals include: rice, soup, meat, veg)  Breakfast: coffee and cookies  Lunch: soup with mushrooms and noodles, egg  Dinner: soup with mushrooms and noodles, egg  Snacks: cookies (from [place])  Beverages: Water, Energy drinks, Coffee (has stopped drinking the energy drink and juice that she was doing before)  How many servings of fruits/vegetables per day: 4  Pre- vitamin?: Yes (was able to get a refill)  Supplements?: No  Experiencing nausea?: No  Experiencing heartburn?: No    Healthy Coping:  Emotional response to diabetes: Other (Pt's dad is in the hospital currently and she has been very stressed)  Informal Support system:: Family (, mom, cousin, aunt)  Stage of change: PREPARATION  "(Decided to change - considering how)    Current Management:       ASSESSMENT:  Ketones: will start testing from tomorrow.   Fasting blood glucoses: 0% in target.  After breakfast: 100% in target.  After lunch: 100% in target.  After dinner: 85% in target.    Grief and stress: her dad passed away from cancer on 4/4. She has had good support (mom, , aunt, cousins, Sikhism group) and states that helps greatly.   She is slowly trying to start taking care of herself but certain days were/are hard and she would not eat anything and hardly sleep for 2-3 hours a night.     She was able to fill rx for testing supplies and also prenatal vitamins.  Has been testing BG 4x/d.  All her FBG have been above goal. Her post prandial BS readings have all been at goal except for 1 elevated reading (food related).   We reviewed the need for insulin for further control - pt understands but wants to give it another few days to work on diet and activity and incorporate a HS snack with protein and good carbs.   We would need to review her BG next week to assess for insulin start but I don't have any openings - I have requested our team schedulers to call pt to schedule her f/u appointment with any available educator.     Shares that she has stopped drinking the energy drink and juice that she was doing before, but could do better with carb foods and portions still.   Is doing basmati rice now - a friend had told her that \"it was better for diabetes.\" Limits the portions though.  Her mom and brother live with her now and mom does most of the cooking.     Reviewed diet and activity guidelines. Encouraged adding more protein to meals and snacks and also incorporating a HS snack with protein and good carbs - gave examples.  Also discussed ketone testing - sent in rx for ketostix today.    INTERVENTION:  Educational topics covered today:  When to Call a Diabetes Educator or OB Provider    PLAN:    We would need to review her BG next week " to assess for insulin start but I don't have any openings - I have requested our team schedulers to call pt to schedule her f/u appointment with any available educator.   Rx for ketostix was sent in.    Check glucose 4 times daily, before breakfast and 1 hour after each meal.     Check Ketones daily for one week, if negative, reduce testing to once a week.      Physical activity recommended: at least 30 mins daily as able/safe. Staying active for 10-15 mins after meals/snacks helps lower BG.     Meal plan:   Limit rice to 1 cup per meal.  Incorporate protein at every meal and snack.  Eat 3 small meals + 2-3 healthy snacks daily, including a bedtime snack.  Follow consistent CHO meal plan, eat CHO and protein/fat at all meals/snacks.    Call/e-mail/[employer] Mobile[employer] message diabetes educator if 3 or more blood sugars are above the goal in 1 week, if ketones are positive, or with questions/concerns.     F/u: team schedulers to call pt   OBGYN:5/2  Instructed pt to always bring her meter, BG log to all clinic visits - pt expressed understanding     Time Spent: 60 minutes  Encounter Type: Individual     Any diabetes medication dose changes were made via the CDE Protocol and Collaborative Practice Agreement with the patient's referring provider. A copy of this encounter was shared with the provider.

## 2022-05-09 NOTE — ADDENDUM NOTE
Encounter addended by: Shabnam Coffey, PT on: 5/9/2022 11:49 AM   Actions taken: Episode resolved, Clinical Note Signed

## 2022-05-09 NOTE — PROGRESS NOTES
[facility]    Outpatient Physical Therapy Discharge Note  Patient: Fan Duran  : 1995    Beginning/End Dates of Reporting Period:  22 to 22    Referring Provider: Sully Barney MD    Therapy Diagnosis: SI joint dysfunction     Client Self Report: Pt arrived 15 min late and would like to be seen for a shorter appointment. She continues to have tingling from her head down to legs. The pain has completed disappeared.     Objective Measurements:  Objective Measure: Pelvic alignment  Details: symmetrical leg length and ASIS          Goals:  Goal Identifier Self-management/HEP   Goal Description Patient will be independent in self-management of condition and HEP.   Target Date 22   Date Met      Progress (detail required for progress note):       Goal Identifier Standing   Goal Description Patient will be able to stand and work in the kitchen for 30 minutes with <3/10 pain in the lower back in order to perform household tasks.   Target Date 22   Date Met      Progress (detail required for progress note):       Goal Identifier Stairs   Goal Description Patient will be able to maneuver stairs with <3/10 pain in the lower back in order to maneuver around her home.   Target Date 22   Date Met      Progress (detail required for progress note):       Goal Identifier Car transfer   Goal Description Patient will be able to get into and out of a car with <3/10 pain in the lower back for improved QOL.   Target Date 22   Date Met      Progress (detail required for progress note):             Plan:  Discharge from therapy.    Discharge:    Reason for Discharge: Patient chooses to discontinue therapy. Pt did not show and cancelled both remaining PT appts.    Equipment Issued: none    Discharge Plan: Patient to continue home program.    Shabnam Coffey, PT, DPT, CLRAFAEL-CLYDE

## 2022-05-11 ENCOUNTER — PATIENT OUTREACH (OUTPATIENT)
Dept: NURSING | Facility: CLINIC | Age: 27
End: 2022-05-11
Payer: COMMERCIAL

## 2022-05-11 NOTE — PROGRESS NOTES
Clinic Care Coordination Contact    Follow Up Progress Note      Assessment: Per patient, she still unable to fill her meds with [facility] even after we both called and resolved with [facility]. Patient agreed to transfer all prescriptions over to [facility] because it was hard to communicate with [facility] staff due to language barrier. CCRN spoke with [facility] today and requested to transfer prescriptions over from [facility].     Patient states pharmacy told her she has 2 insurance. She is not sure what other insurance she has besides the [facility] [place]. She's not able to get lancets and strips prescription because of this. She would like assistant calling the county to find out. Please call her at [phone]      Care Gaps:    Health Maintenance Due   Topic Date Due     PREVENTIVE CARE VISIT  Never done     ASTHMA ACTION PLAN  Never done     ADVANCE CARE PLANNING  Never done     ASTHMA CONTROL TEST  04/06/2022     OBGCT (OB)  04/16/2022     REPEAT ANTIBODY SCREEN (OB)  05/14/2022 5/13/2022 with PCP    Goals addressed this encounter:    Goals Addressed    None         Plan:   1) CCRN will follow up on in again in 1 week.

## 2022-05-13 ENCOUNTER — PATIENT OUTREACH (OUTPATIENT)
Dept: NURSING | Facility: CLINIC | Age: 27
End: 2022-05-13
Payer: COMMERCIAL

## 2022-05-13 ENCOUNTER — PRENATAL OFFICE VISIT (OUTPATIENT)
Dept: FAMILY MEDICINE | Facility: CLINIC | Age: 27
End: 2022-05-13

## 2022-05-13 VITALS
WEIGHT: 171.5 LBS | HEIGHT: 59 IN | HEART RATE: 84 BPM | RESPIRATION RATE: 16 BRPM | OXYGEN SATURATION: 98 % | TEMPERATURE: 98 F | DIASTOLIC BLOOD PRESSURE: 60 MMHG | BODY MASS INDEX: 34.57 KG/M2 | SYSTOLIC BLOOD PRESSURE: 98 MMHG

## 2022-05-13 DIAGNOSIS — F43.21 GRIEF REACTION: ICD-10-CM

## 2022-05-13 DIAGNOSIS — G47.00 INSOMNIA, UNSPECIFIED TYPE: ICD-10-CM

## 2022-05-13 DIAGNOSIS — Z34.90 PREGNANCY, UNSPECIFIED GESTATIONAL AGE: Primary | ICD-10-CM

## 2022-05-13 DIAGNOSIS — O24.419 GESTATIONAL DIABETES MELLITUS (GDM), ANTEPARTUM, GESTATIONAL DIABETES METHOD OF CONTROL UNSPECIFIED: ICD-10-CM

## 2022-05-13 LAB
ALBUMIN MFR UR ELPH: 7.8 MG/DL
ALT SERPL W P-5'-P-CCNC: <9 U/L (ref 0–45)
APTT PPP: 28 SECONDS (ref 22–38)
AST SERPL W P-5'-P-CCNC: 12 U/L (ref 0–40)
CREAT SERPL-MCNC: 0.58 MG/DL (ref 0.6–1.1)
CREAT UR-MCNC: 83 MG/DL
ERYTHROCYTE [DISTWIDTH] IN BLOOD BY AUTOMATED COUNT: 13.3 % (ref 10–15)
GFR SERPL CREATININE-BSD FRML MDRD: >90 ML/MIN/1.73M2
HCT VFR BLD AUTO: 34.1 % (ref 35–47)
HGB BLD-MCNC: 11.3 G/DL (ref 11.7–15.7)
INR PPP: 0.95 (ref 0.85–1.15)
MCH RBC QN AUTO: 28.5 PG (ref 26.5–33)
MCHC RBC AUTO-ENTMCNC: 33.1 G/DL (ref 31.5–36.5)
MCV RBC AUTO: 86 FL (ref 78–100)
PLATELET # BLD AUTO: 266 10E3/UL (ref 150–450)
PROT/CREAT 24H UR: 0.09 MG/MG CR
RBC # BLD AUTO: 3.96 10E6/UL (ref 3.8–5.2)
WBC # BLD AUTO: 10.2 10E3/UL (ref 4–11)

## 2022-05-13 PROCEDURE — 86780 TREPONEMA PALLIDUM: CPT | Performed by: FAMILY MEDICINE

## 2022-05-13 PROCEDURE — 85610 PROTHROMBIN TIME: CPT | Performed by: FAMILY MEDICINE

## 2022-05-13 PROCEDURE — 84450 TRANSFERASE (AST) (SGOT): CPT | Performed by: FAMILY MEDICINE

## 2022-05-13 PROCEDURE — 84156 ASSAY OF PROTEIN URINE: CPT | Performed by: FAMILY MEDICINE

## 2022-05-13 PROCEDURE — 85730 THROMBOPLASTIN TIME PARTIAL: CPT | Performed by: FAMILY MEDICINE

## 2022-05-13 PROCEDURE — 82565 ASSAY OF CREATININE: CPT | Performed by: FAMILY MEDICINE

## 2022-05-13 PROCEDURE — 85027 COMPLETE CBC AUTOMATED: CPT | Performed by: FAMILY MEDICINE

## 2022-05-13 PROCEDURE — 84460 ALANINE AMINO (ALT) (SGPT): CPT | Performed by: FAMILY MEDICINE

## 2022-05-13 PROCEDURE — 99207 PR PRENATAL VISIT: CPT | Performed by: FAMILY MEDICINE

## 2022-05-13 PROCEDURE — 36415 COLL VENOUS BLD VENIPUNCTURE: CPT | Performed by: FAMILY MEDICINE

## 2022-05-13 ASSESSMENT — ASTHMA QUESTIONNAIRES
QUESTION_2 LAST FOUR WEEKS HOW OFTEN HAVE YOU HAD SHORTNESS OF BREATH: MORE THAN ONCE A DAY
QUESTION_5 LAST FOUR WEEKS HOW WOULD YOU RATE YOUR ASTHMA CONTROL: SOMEWHAT CONTROLLED
ACT_TOTALSCORE: 11
QUESTION_3 LAST FOUR WEEKS HOW OFTEN DID YOUR ASTHMA SYMPTOMS (WHEEZING, COUGHING, SHORTNESS OF BREATH, CHEST TIGHTNESS OR PAIN) WAKE YOU UP AT NIGHT OR EARLIER THAN USUAL IN THE MORNING: TWO OR THREE NIGHTS A WEEK
QUESTION_4 LAST FOUR WEEKS HOW OFTEN HAVE YOU USED YOUR RESCUE INHALER OR NEBULIZER MEDICATION (SUCH AS ALBUTEROL): THREE OR MORE TIMES PER DAY
ACT_TOTALSCORE: 11
QUESTION_1 LAST FOUR WEEKS HOW MUCH OF THE TIME DID YOUR ASTHMA KEEP YOU FROM GETTING AS MUCH DONE AT WORK, SCHOOL OR AT HOME: A LITTLE OF THE TIME

## 2022-05-13 ASSESSMENT — PATIENT HEALTH QUESTIONNAIRE - PHQ9: SUM OF ALL RESPONSES TO PHQ QUESTIONS 1-9: 9

## 2022-05-13 NOTE — PROGRESS NOTES
Clinic Care Coordination Contact    Follow Up Progress Note      Assessment: follow up on med refill. CCRN spoke with [facility] today and confirmed that they were able to fill patient's diabetic supplies including ketostix. It's ready for patient to be picked up. Prenatal is not covered by insurance and they had tried with other prenatal brands but still not cover. Patient agrees to pay around $10 for 3 months supplies. Reminded patient of upcoming appt with Dr Barney today. Patient would like to hold off on overdue care gaps at this time due to pregnancy. She plans to address this a couple months after she gives birth. EDC on 8/10/2022 per patient.     Care Gaps:    Health Maintenance Due   Topic Date Due     PREVENTIVE CARE VISIT  Never done     ASTHMA ACTION PLAN  Never done     ADVANCE CARE PLANNING  Never done     ASTHMA CONTROL TEST  04/06/2022     OBGCT (OB)  04/16/2022     REPEAT ANTIBODY SCREEN (OB)  05/14/2022       Postponed to due to pregnancy per patient.     Goals addressed this encounter:    Goals Addressed                    This Visit's Progress       COMPLETED: Med refill (pt-stated)   100%        Goal Statement: I would like CCRN to assist me with med refill the next 30 days.     Date goal set: 4/5/2022  Barriers: language barrier  Strengths: motivated to attend PCP appt  Date to Achieve By: 5/5/2022  Patient expressed understanding of goal: Yes    Action steps to achieve this goal:  1. I will answer my phone when I am contacted by CCRN.  3. I will follow up with CCC regarding this goal at each outreach until it is completed.                  Outreach Frequency: 2 months    Plan:   1) Patient will  her prescriptions today at [facility].   2) CCRN will follow up in 8 weeks.

## 2022-05-13 NOTE — PROGRESS NOTES
"SUBJECTIVE:   at 27w6d by 7 week US. Estimated Date of Delivery: Aug 6, 2022.  She denies nausea and vomiting. She denies abdominal pain/cramping, vaginal bleeding, leakage of fluid. Fetal movement is present.   Concerns: She has not yet scheduled follow-up diabetes education appointment. She had not been able to check sugar due to insurance issues- see CCC note from today- this is now resolved and she can .  Her father passed away 22. She has been grieving- had to wait 3 weeks for burial. Difficulty with sleeping. She is having dreams, worried about things. Worried about other people dying. Feels her thoughts are racing.  Denies headache, vision changes, swelling.  ROS  Negative except as noted above in HPI.  OBJECTIVE:  Blood pressure 98/60, pulse 84, temperature 98  F (36.7  C), temperature source Oral, resp. rate 16, height 1.499 m (4' 11\"), weight 77.8 kg (171 lb 8 oz), last menstrual period 10/21/2021, SpO2 98 %, not currently breastfeeding.  Gen: comfortable, no acute distress.  Ext: no edema  See OB Vitals flowsheet.  ASSESSMENT/PLAN:  Fan was seen today for prenatal care.    Diagnoses and all orders for this visit:    Pregnancy, unspecified gestational age  -     Treponema Abs w Reflex to RPR and Titer; Future  -     CBC with platelets; Future  -     Protein  random urine; Future  -     AST; Future  -     ALT; Future  -     Creatinine; Future  -     Partial thromboplastin time; Future  -     INR; Future    Gestational diabetes mellitus (GDM), antepartum, gestational diabetes method of control unspecified: Due to insurance issue, was out of testing strips- she will pick these up after today's appointment (see CCC note from today) and start checking for next 2 weeks. Her fasting sugars were previously elevated slightly but all other were in range- she will bring to her appointment in 2 weeks. Check baseline HELLP labs, urine protein/cr ratio.     Insomnia, unspecified type/Grief " reaction : Offered support- reviewed options for grief counseling, therapy. She is struggling most with racing thoughts at bedtime- will try unisom to help with sleep initiation.  -     doxylamine (UNISOM) 25 MG TABS tablet; Take 1 tablet (25 mg) by mouth At Bedtime      -IUP at 27w6d:     Prenatal labs reviewed      RTC in 2 weeks or sooner with problems. Tdap next visit, schedule growth US      Sully Barney MD

## 2022-05-14 LAB — T PALLIDUM AB SER QL: NONREACTIVE

## 2022-05-25 ENCOUNTER — PRENATAL OFFICE VISIT (OUTPATIENT)
Dept: FAMILY MEDICINE | Facility: CLINIC | Age: 27
End: 2022-05-25
Payer: COMMERCIAL

## 2022-05-25 VITALS
WEIGHT: 170.5 LBS | DIASTOLIC BLOOD PRESSURE: 60 MMHG | HEART RATE: 78 BPM | BODY MASS INDEX: 34.37 KG/M2 | HEIGHT: 59 IN | OXYGEN SATURATION: 97 % | TEMPERATURE: 98 F | SYSTOLIC BLOOD PRESSURE: 92 MMHG | RESPIRATION RATE: 16 BRPM

## 2022-05-25 DIAGNOSIS — Z34.90 PREGNANCY, UNSPECIFIED GESTATIONAL AGE: Primary | ICD-10-CM

## 2022-05-25 DIAGNOSIS — O24.419 GESTATIONAL DIABETES MELLITUS (GDM), ANTEPARTUM, GESTATIONAL DIABETES METHOD OF CONTROL UNSPECIFIED: ICD-10-CM

## 2022-05-25 PROCEDURE — 90715 TDAP VACCINE 7 YRS/> IM: CPT | Performed by: FAMILY MEDICINE

## 2022-05-25 PROCEDURE — 99207 PR PRENATAL VISIT: CPT | Performed by: FAMILY MEDICINE

## 2022-05-25 PROCEDURE — 90471 IMMUNIZATION ADMIN: CPT | Performed by: FAMILY MEDICINE

## 2022-05-25 NOTE — PROGRESS NOTES
"SUBJECTIVE:   at 29w4d. Estimated Date of Delivery: Aug 6, 2022.  She is doing better- her sleep is improved with unisom. She denies vaginal bleeding, leakage of fluid, or urinary symptoms. Fetal movement is present. She is not having contractions.  Concerns: has been checking sugars regularly the last two weeks. Did not bring in meter. Reports fasting sugars 97-99 (highest 101). Her postprandial levels are all under 120. She usually eats around 7 pm (rice, soup, vegetables).   ROS  Negative except as noted above in HPI  OBJECTIVE:  Blood pressure 92/60, pulse 78, temperature 98  F (36.7  C), temperature source Oral, resp. rate 16, height 1.499 m (4' 11\"), weight 77.3 kg (170 lb 8 oz), last menstrual period 10/21/2021, SpO2 97 %, not currently breastfeeding.  Gen: Comfortable, no acute distress.  Abd: Gravid, non-tender  Ext: no edema  See OB Vitals flowsheet.  ASSESSMENT/PLAN:  Fan was seen today for prenatal care.    Diagnoses and all orders for this visit:    Pregnancy, unspecified gestational age    Gestational diabetes mellitus (GDM), antepartum, gestational diabetes method of control unspecified: Check growth US due to GDM A1. Persistently elevated fasting sugars only- all other post-prandial sugars at goal- encouraged her to switch from white rice to brown rice and to limit her rice portions in evenings.   -     US OB > 14 Weeks; Future    Other orders  -     TDAP VACCINE (Adacel, Boostrix)  [[id number]]      -IUP at 29w4d:     Prenatal labs reviewed    RTC in 2 weeks or sooner with problems.      Sully Barney MD    "

## 2022-06-02 ENCOUNTER — PRENATAL OFFICE VISIT (OUTPATIENT)
Dept: FAMILY MEDICINE | Facility: CLINIC | Age: 27
End: 2022-06-02
Payer: COMMERCIAL

## 2022-06-02 VITALS
WEIGHT: 173.25 LBS | RESPIRATION RATE: 16 BRPM | DIASTOLIC BLOOD PRESSURE: 68 MMHG | HEART RATE: 92 BPM | SYSTOLIC BLOOD PRESSURE: 96 MMHG | BODY MASS INDEX: 34.92 KG/M2 | OXYGEN SATURATION: 97 % | TEMPERATURE: 98.1 F | HEIGHT: 59 IN

## 2022-06-02 DIAGNOSIS — Z34.90 PREGNANCY, UNSPECIFIED GESTATIONAL AGE: Primary | ICD-10-CM

## 2022-06-02 DIAGNOSIS — O24.419 GESTATIONAL DIABETES MELLITUS (GDM), ANTEPARTUM, GESTATIONAL DIABETES METHOD OF CONTROL UNSPECIFIED: ICD-10-CM

## 2022-06-02 PROCEDURE — 99207 PR PRENATAL VISIT: CPT | Performed by: FAMILY MEDICINE

## 2022-06-02 NOTE — PROGRESS NOTES
"SUBJECTIVE:    at 30w5d. Estimated Date of Delivery: Aug 6, 2022.  She is doing well. She denies vaginal bleeding, leakage of fluid, or urinary symptoms. Fetal movement is present. She is not having contractions.  Concerns: has not yet scheduled growth US- they did not call patient. She continues to check sugars four times daily. All 1 hour postprandials at goal. Her fasting sugars are now improved after she changed her diet- eating brown rice and she does not eat late at night. She had one elevation 135 fasting this morning but she went fishing with family last night and ate very late. All other fastings less than 95.  ROS  Negative except as noted above in HPI  OBJECTIVE:  Blood pressure 96/68, pulse 92, temperature 98.1  F (36.7  C), temperature source Oral, resp. rate 16, height 1.499 m (4' 11\"), weight 78.6 kg (173 lb 4 oz), last menstrual period 10/21/2021, SpO2 97 %, not currently breastfeeding.  Gen: Comfortable, no acute distress.  Abd: Gravid, non-tender.  See OB Vitals flowsheet.  ASSESSMENT/PLAN:  Fan was seen today for prenatal care.    Diagnoses and all orders for this visit:    Pregnancy, unspecified gestational age    Gestational diabetes mellitus (GDM), antepartum, gestational diabetes method of control unspecified- gave her # to call to schedule growth US. Sugars at goal with dietary changes. Continue to monitor closely.       -IUP at 30w5d:     Prenatal labs reviewed     RTC in 2 weeks or sooner with problems.        Sully Barney MD    "

## 2022-06-09 ENCOUNTER — HOSPITAL ENCOUNTER (OUTPATIENT)
Dept: ULTRASOUND IMAGING | Facility: HOSPITAL | Age: 27
Discharge: HOME | End: 2022-06-09
Attending: FAMILY MEDICINE | Admitting: FAMILY MEDICINE
Payer: COMMERCIAL

## 2022-06-09 DIAGNOSIS — O24.419 GESTATIONAL DIABETES MELLITUS (GDM), ANTEPARTUM, GESTATIONAL DIABETES METHOD OF CONTROL UNSPECIFIED: ICD-10-CM

## 2022-06-09 PROCEDURE — 76816 OB US FOLLOW-UP PER FETUS: CPT

## 2022-06-10 ENCOUNTER — TELEPHONE (OUTPATIENT)
Dept: FAMILY MEDICINE | Facility: CLINIC | Age: 27
End: 2022-06-10
Payer: COMMERCIAL

## 2022-06-10 NOTE — TELEPHONE ENCOUNTER
Reason for Call:  Request for results:    Name of test or procedure: US fetal    Date of test of procedure: 06/09/22    Location of the test or procedure: [facility]    OK to leave the result message on voice mail or with a family member? NO    Phone number Patient can be reached at:  Home number on file [phone] (home)    Additional comments: Pt is inquiring about what US results means please.  Thanks    Call taken on 6/10/2022 at 2:24 PM by Aria Harrington CMA. TC

## 2022-06-30 ENCOUNTER — PRENATAL OFFICE VISIT (OUTPATIENT)
Dept: FAMILY MEDICINE | Facility: CLINIC | Age: 27
End: 2022-06-30
Payer: COMMERCIAL

## 2022-06-30 VITALS
TEMPERATURE: 98.6 F | SYSTOLIC BLOOD PRESSURE: 92 MMHG | BODY MASS INDEX: 35.9 KG/M2 | WEIGHT: 177.75 LBS | DIASTOLIC BLOOD PRESSURE: 62 MMHG | OXYGEN SATURATION: 98 % | HEART RATE: 103 BPM

## 2022-06-30 DIAGNOSIS — O24.419 GESTATIONAL DIABETES MELLITUS (GDM), ANTEPARTUM, GESTATIONAL DIABETES METHOD OF CONTROL UNSPECIFIED: ICD-10-CM

## 2022-06-30 DIAGNOSIS — Z34.90 PREGNANCY, UNSPECIFIED GESTATIONAL AGE: Primary | ICD-10-CM

## 2022-06-30 PROCEDURE — 99207 PR PRENATAL VISIT: CPT | Performed by: FAMILY MEDICINE

## 2022-06-30 ASSESSMENT — ASTHMA QUESTIONNAIRES
QUESTION_2 LAST FOUR WEEKS HOW OFTEN HAVE YOU HAD SHORTNESS OF BREATH: NOT AT ALL
ACT_TOTALSCORE: 24
QUESTION_3 LAST FOUR WEEKS HOW OFTEN DID YOUR ASTHMA SYMPTOMS (WHEEZING, COUGHING, SHORTNESS OF BREATH, CHEST TIGHTNESS OR PAIN) WAKE YOU UP AT NIGHT OR EARLIER THAN USUAL IN THE MORNING: NOT AT ALL
QUESTION_1 LAST FOUR WEEKS HOW MUCH OF THE TIME DID YOUR ASTHMA KEEP YOU FROM GETTING AS MUCH DONE AT WORK, SCHOOL OR AT HOME: NONE OF THE TIME
QUESTION_4 LAST FOUR WEEKS HOW OFTEN HAVE YOU USED YOUR RESCUE INHALER OR NEBULIZER MEDICATION (SUCH AS ALBUTEROL): NOT AT ALL
QUESTION_5 LAST FOUR WEEKS HOW WOULD YOU RATE YOUR ASTHMA CONTROL: WELL CONTROLLED
ACT_TOTALSCORE: 24

## 2022-06-30 NOTE — PROGRESS NOTES
SUBJECTIVE:   at 34w5d. Estimated Date of Delivery: Aug 6, 2022.  She is doing well. She denies vaginal bleeding, leakage of fluid, or urinary symptoms. Fetal movement is normal- lots more than previous pregnancy. She is not having contractions.  Concerns: She did miss checking her blood sugars the last few weeks due to cousin's sudden death earlier this month. Last few days she has checked and it has been at goal- improved since changing her diet to brown rice.   ROS  Negative except as noted above in HPI  OBJECTIVE:  Blood pressure 92/62, pulse 103, temperature 98.6  F (37  C), temperature source Oral, weight 80.6 kg (177 lb 12 oz), last menstrual period 10/21/2021, SpO2 98 %, not currently breastfeeding.  Gen: Comfortable, no acute distress.  Abd: Gravid, non-tender  See OB Vitals flowsheet.  ASSESSMENT/PLAN:  Fan was seen today for prenatal care.    Diagnoses and all orders for this visit:    Pregnancy, unspecified gestational age    Gestational diabetes mellitus (GDM), antepartum, gestational diabetes method of control unspecified: Sugars at goal per patient report- she did have a few weeks following death of her cousin when she did not check regularly, but recent sugars have been at goal. Plan for 1 more growth US.   -     US OB > 14 Weeks; Future- plan for growth US in about 2 weeks.      -IUP at 34w5d:     Prenatal labs reviewed     RTC in 1 weeks or sooner with problems. GBS next visit. Will see my partner, Dr. Ortiz for next two visits.        Sully Barney MD

## 2022-07-13 ENCOUNTER — PRENATAL OFFICE VISIT (OUTPATIENT)
Dept: FAMILY MEDICINE | Facility: CLINIC | Age: 27
End: 2022-07-13
Payer: COMMERCIAL

## 2022-07-13 VITALS
HEIGHT: 59 IN | OXYGEN SATURATION: 98 % | BODY MASS INDEX: 35.84 KG/M2 | WEIGHT: 177.75 LBS | DIASTOLIC BLOOD PRESSURE: 72 MMHG | TEMPERATURE: 98.1 F | RESPIRATION RATE: 20 BRPM | SYSTOLIC BLOOD PRESSURE: 102 MMHG | HEART RATE: 93 BPM

## 2022-07-13 DIAGNOSIS — O24.410 DIET CONTROLLED GESTATIONAL DIABETES MELLITUS (GDM), ANTEPARTUM: ICD-10-CM

## 2022-07-13 DIAGNOSIS — R25.2 MUSCLE CRAMPS: ICD-10-CM

## 2022-07-13 DIAGNOSIS — O09.90 HIGH-RISK PREGNANCY, UNSPECIFIED TRIMESTER: Primary | ICD-10-CM

## 2022-07-13 PROCEDURE — 87653 STREP B DNA AMP PROBE: CPT | Performed by: FAMILY MEDICINE

## 2022-07-13 PROCEDURE — 99207 PR COMPLICATED OB VISIT: CPT | Performed by: FAMILY MEDICINE

## 2022-07-13 NOTE — PROGRESS NOTES
"SUBJECTIVE:  Fan Duran  at 36w4d. Estimated Date of Delivery: Aug 6, 2022.  She is doing well. She denies vaginal bleeding, leakage of fluid, or urinary symptoms. Fetal movement is present. She is not having contractions.  Concerns:  Muscle cramps in calves, hip pain, some ctx.  Did not bring blood sugar log but state that in the AM, fasting sugars are around 85 and 1 hour post-prandials are between .   ROS  Negative except as noted above in HPI  OBJECTIVE:  Blood pressure 102/72, pulse 93, temperature 98.1  F (36.7  C), temperature source Oral, resp. rate 20, height 1.499 m (4' 11\"), weight 80.6 kg (177 lb 12 oz), last menstrual period 10/21/2021, SpO2 98 %, not currently breastfeeding.  Gen: Comfortable, no acute distress.  Abd: Gravid, non-tender  See OB Vitals flowsheet.  ASSESSMENT/PLAN:  Fan was seen today for prenatal care.    Diagnoses and all orders for this visit:    High-risk pregnancy, unspecified trimester:  -     Group B strep PCR    Diet controlled gestational diabetes mellitus (GDM), antepartum: well-controlled. She will keep checking sugars and will bring log book to next visit    Muscle cramps: will try magnesium for muscle cramps.   -     magnesium 500 MG TABS; Take 1 tablet by mouth At Bedtime      -IUP at 36w4d:     Prenatal labs reviewed and normal.     cfDNA normal    Peripartum Anesthesia: the patient does desire an epidural during labor.     Post-partum Contraception: depoprovera     Breast/Bottle: Formula     Reviewed plans for getting to the hospital    RTC in 1 week or sooner with problems.      Vicky Ortiz MD    "

## 2022-07-14 ENCOUNTER — HOSPITAL ENCOUNTER (OUTPATIENT)
Dept: ULTRASOUND IMAGING | Facility: HOSPITAL | Age: 27
Discharge: HOME | End: 2022-07-14
Attending: FAMILY MEDICINE | Admitting: FAMILY MEDICINE
Payer: COMMERCIAL

## 2022-07-14 DIAGNOSIS — O24.419 GESTATIONAL DIABETES MELLITUS (GDM), ANTEPARTUM, GESTATIONAL DIABETES METHOD OF CONTROL UNSPECIFIED: ICD-10-CM

## 2022-07-14 LAB — GP B STREP DNA SPEC QL NAA+PROBE: NEGATIVE

## 2022-07-14 PROCEDURE — 76816 OB US FOLLOW-UP PER FETUS: CPT

## 2022-07-16 ENCOUNTER — HEALTH MAINTENANCE LETTER (OUTPATIENT)
Age: 27
End: 2022-07-16

## 2022-07-18 ENCOUNTER — PRENATAL OFFICE VISIT (OUTPATIENT)
Dept: FAMILY MEDICINE | Facility: CLINIC | Age: 27
End: 2022-07-18
Payer: COMMERCIAL

## 2022-07-18 VITALS
HEIGHT: 59 IN | BODY MASS INDEX: 36.54 KG/M2 | DIASTOLIC BLOOD PRESSURE: 62 MMHG | TEMPERATURE: 98 F | WEIGHT: 181.25 LBS | RESPIRATION RATE: 16 BRPM | OXYGEN SATURATION: 98 % | HEART RATE: 110 BPM | SYSTOLIC BLOOD PRESSURE: 96 MMHG

## 2022-07-18 DIAGNOSIS — O24.410 DIET CONTROLLED GESTATIONAL DIABETES MELLITUS (GDM) IN THIRD TRIMESTER: ICD-10-CM

## 2022-07-18 DIAGNOSIS — O09.90 HIGH-RISK PREGNANCY, UNSPECIFIED TRIMESTER: Primary | ICD-10-CM

## 2022-07-18 PROCEDURE — 99207 PR COMPLICATED OB VISIT: CPT | Performed by: FAMILY MEDICINE

## 2022-07-18 NOTE — PROGRESS NOTES
"SUBJECTIVE:  Fan Duran  at 37w2d. Estimated Date of Delivery: Aug 6, 2022.  She is doing well. She denies vaginal bleeding, leakage of fluid, or urinary symptoms. Fetal movement is present. She is not having contractions.  Concerns:     95 or lower in the AM, fasting  Post-prandial sugars .     ROS  Negative except as noted above in HPI  OBJECTIVE:  Blood pressure 96/62, pulse 110, temperature 98  F (36.7  C), temperature source Oral, resp. rate 16, height 1.499 m (4' 11\"), weight 82.2 kg (181 lb 4 oz), last menstrual period 10/21/2021, SpO2 98 %, not currently breastfeeding.  Gen: Comfortable, no acute distress.  Abd: Gravid, non-tender  See OB Vitals flowsheet.  ASSESSMENT/PLAN:  Fan was seen today for prenatal care.    Diagnoses and all orders for this visit:    High-risk pregnancy, unspecified trimester:     Diet controlled gestational diabetes mellitus (GDM) in third trimester: well-controlled with diet.       -IUP at 37w2d:     Prenatal labs reviewed and normal.     cfDNA normal    Fetal survey was done at 20 weeks and was normal. Last growth ultrasound showed EFW in 67th %ile.     GBS status is negative.     Peripartum Anesthesia: the patient might desire an epidural during labor.     Post-partum Contraception: depoprovera     Breast/Bottle: Formula     Reviewed plans for getting to the hospital.    RTC in 1 week or sooner with problems.      Vicky Ortiz MD    "

## 2022-07-19 ENCOUNTER — PATIENT OUTREACH (OUTPATIENT)
Dept: NURSING | Facility: CLINIC | Age: 27
End: 2022-07-19

## 2022-07-19 NOTE — PROGRESS NOTES
Clinic Care Coordination Contact    Follow Up Progress Note      Assessment: CCRN spoke with patient via phone today. Per patient, she is now able to refill her meds with no issues and no further assist needed with her med refill. Per patient, she is due on 8/3/22 and needs assist to add new born under her insurance. New goal created.     Care Gaps:    Health Maintenance Due   Topic Date Due     PREVENTIVE CARE VISIT  Never done     ASTHMA ACTION PLAN  Never done     ADVANCE CARE PLANNING  Never done     OBGCT (OB)  2022     REPEAT ANTIBODY SCREEN (OB)  2022     PAP  2023       Per patient, she will address this a couple months after she gave birth.    Goals addressed this encounter:    Goals Addressed                    This Visit's Progress       1. Add new born to insurance (pt-stated)   20%      Goal Statement: I want to add my baby to my MNSure case in the next 90 days so that they will have active health insurance.    Date Goal set: 2022  Barriers: Language; Difficulty navigating MNSure system  Strengths: Willing to accept support  Date to Achieve By: 10/19/22  Patient expressed understanding of goal: Yes    Action steps to achieve this goal:  1. I will complete the Add  Form with the help of [facility] and request it be faxed to [facility] at fax number [phone].  2. I will provide all required information so that my baby can be added to my MNSure Case.  3. I will contact [facility] at [phone] with the support of [facility] if there is further clarification needed.  4. I will request support with contacting the [place] Billing Department at [phone] to resolve medical claims.     IDALIA on 8/3/2022                Outreach Frequency: 6 weeks    Plan:   1) CHW to referral patient to [facility] to add new born under her insurance post delivery

## 2022-07-19 NOTE — LETTER
[facility]  Patient Centered Plan of Care  About Me:        Patient Name:  Fan Duran    YOB: 1995  Age:         27 year old   Maya MRN:    [id number] Telephone Information:  Home Phone [phone]   Mobile [phone]       Address:  [address] [place] [address] Email address:  [email or web address]      Emergency Contact(s)    Name Relationship Lgl Grd Work Phone Home Phone Mobile Phone   1DONN URBANO Cousin    [phone]           Primary language:  English     needed? No   [facility]:  [phone] op. 1  Other communication barriers:None    Preferred Method of Communication:     Current living arrangement: I live in a private home with spouse    Mobility Status/ Medical Equipment: Independent        Health Maintenance  Health Maintenance Reviewed: Due/Overdue       My Access Plan  Medical Emergency 911   Primary Clinic Line   -     24 Hour Appointment Line [phone] or  [phone] ([phone]) (toll-free)   24 Hour Nurse Line [phone] (toll-free)   Preferred Urgent Care [facility] [phone]     Preferred Hospital [facility]  [phone]     Preferred Pharmacy [facility] - [place], [place] - [facility]     Behavioral Health Crisis Line The National Suicide Prevention Lifeline at 1-905.927.9355 or 988             My Care Team Members  Patient Care Team       Relationship Specialty Notifications Start End    Gloria Berg MD PCP - General Family Practice  9/27/19     Phone: [phone] Fax: [phone]         [address] [place] [address]    Angela Ho, PharmD Pharmacist Pharmacist  11/12/19     Phone: [phone]          [facility] [address] [place] [address]    Sully Barney MD Assigned PCP   1/9/22     Phone: [phone] Fax: [phone]         1983 [address] [address]    Mary  Christy, RN Lead Care Coordinator Primary Care - [facility] Admissions 22             My Care Plans  Self Management and Treatment Plan  Goals and (Comments)   Goals        General     1. Add new born to insurance (pt-stated)      Notes - Note edited  2022  1:15 PM by Christy Hernandez RN     Goal Statement: I want to add my baby to my MNSure case in the next 90 days so that they will have active health insurance.    Date Goal set: 2022  Barriers: Language; Difficulty navigating MNSure system  Strengths: Willing to accept support  Date to Achieve By: 10/19/22  Patient expressed understanding of goal: Yes    Action steps to achieve this goal:  1. I will complete the Add [place] Form with the help of [facility] and request it be faxed to [facility] at fax number [phone].  2. I will provide all required information so that my baby can be added to my MNSure Case.  3. I will contact [facility] at [phone] with the support of [facility] if there is further clarification needed.  4. I will request support with contacting the [place] Billing Department at [phone] to resolve medical claims.     IDALIA on 8/3/2022                Action Plans on File:                       Advance Care Plans/Directives Type:   No data recorded    My Medical and Care Information  Problem List   Patient Active Problem List   Diagnosis     High-risk pregnancy, unspecified trimester     Class 1 obesity without serious comorbidity in adult, unspecified BMI, unspecified obesity type     Gestational diabetes      Current Medications and Allergies:  See printed Medication Report.    Care Coordination Start Date: 2022   Frequency of Care Coordination: 6 weeks     Form Last Updated: 2022

## 2022-07-25 ENCOUNTER — PRENATAL OFFICE VISIT (OUTPATIENT)
Dept: FAMILY MEDICINE | Facility: CLINIC | Age: 27
End: 2022-07-25
Payer: COMMERCIAL

## 2022-07-25 VITALS
SYSTOLIC BLOOD PRESSURE: 112 MMHG | BODY MASS INDEX: 36.44 KG/M2 | HEIGHT: 59 IN | HEART RATE: 79 BPM | DIASTOLIC BLOOD PRESSURE: 66 MMHG | TEMPERATURE: 98.3 F | RESPIRATION RATE: 20 BRPM | WEIGHT: 180.75 LBS | OXYGEN SATURATION: 97 %

## 2022-07-25 DIAGNOSIS — Z34.90 PREGNANCY, UNSPECIFIED GESTATIONAL AGE: Primary | ICD-10-CM

## 2022-07-25 PROCEDURE — 99207 PR PRENATAL VISIT: CPT | Performed by: FAMILY MEDICINE

## 2022-07-25 NOTE — PROGRESS NOTES
"SUBJECTIVE:   at 38w2d by 7 week US. Estimated Date of Delivery: Aug 6, 2022.  She denies vaginal bleeding, leakage of fluid, or urinary symptoms. Fetal movement is present. She is having occasional contractions.  Concerns: Grandmother  recently- her family tried to hide it from her because she has lost so many people recently in the last year- and they worry about her. She is using unisom but still having some difficulty sleeping. Worries about things and her baby. Postprandial 1 hours- all normal 120's. Fasting sugars- has had some elevations 100-101. No change to her diet.  ROS  Negative except as noted above in HPI  OBJECTIVE:  Blood pressure 112/66, pulse 79, temperature 98.3  F (36.8  C), temperature source Oral, resp. rate 20, height 1.499 m (4' 11\"), weight 82 kg (180 lb 12 oz), last menstrual period 10/21/2021, SpO2 97 %, not currently breastfeeding.  Gen: Comfortable, no acute distress.  Abd: Gravid, non-tender  See OB Vitals flowsheet.  SVE: closed  ASSESSMENT/PLAN:  Fan was seen today for prenatal care.    Diagnoses and all orders for this visit:    Pregnancy, unspecified gestational age  -     Breast Pump Order for DME - ONLY FOR DME      -IUP at 38w2d:     Prenatal labs reviewed     Few elevated fasting sugars, otherwise at goal. Normal growth US. No change to diet or activity- possible her elevations are related to stress/poor sleep- lost her grandmother recently. Will continue to monitor for the next week.     RTC in 1 weeks or sooner with problems.      Sully Barney MD    "

## 2022-08-01 ENCOUNTER — PRENATAL OFFICE VISIT (OUTPATIENT)
Dept: FAMILY MEDICINE | Facility: CLINIC | Age: 27
End: 2022-08-01
Payer: COMMERCIAL

## 2022-08-01 VITALS
DIASTOLIC BLOOD PRESSURE: 62 MMHG | WEIGHT: 178.5 LBS | SYSTOLIC BLOOD PRESSURE: 94 MMHG | TEMPERATURE: 97.7 F | HEIGHT: 59 IN | HEART RATE: 90 BPM | OXYGEN SATURATION: 99 % | RESPIRATION RATE: 16 BRPM | BODY MASS INDEX: 35.99 KG/M2

## 2022-08-01 DIAGNOSIS — Z34.90 PREGNANCY, UNSPECIFIED GESTATIONAL AGE: Primary | ICD-10-CM

## 2022-08-01 PROCEDURE — 99207 PR PRENATAL VISIT: CPT | Performed by: FAMILY MEDICINE

## 2022-08-01 ASSESSMENT — PAIN SCALES - GENERAL: PAINLEVEL: MILD PAIN (2)

## 2022-08-02 NOTE — PROGRESS NOTES
"SUBJECTIVE:   at 39w2d. Estimated Date of Delivery: Aug 6, 2022.  She is doing well. She denies vaginal bleeding, leakage of fluid, or urinary symptoms. Fetal movement is present. She is having irregular contractions.  Concerns: Two elevated fasting sugars, 100, 110, otherwise all normal fasting and 1 hour postprandial glucose readings.   ROS  Negative except as noted above in HPI  OBJECTIVE:  Blood pressure 94/62, pulse 90, temperature 97.7  F (36.5  C), temperature source Oral, resp. rate 16, height 1.499 m (4' 11\"), weight 81 kg (178 lb 8 oz), last menstrual period 10/21/2021, SpO2 99 %, not currently breastfeeding.  Gen: Comfortable, no acute distress.  Abd: Gravid, non-tender  See OB Vitals flowsheet.  ASSESSMENT/PLAN:  Fan was seen today for prenatal care.    Diagnoses and all orders for this visit:    Pregnancy, unspecified gestational age  -     Breast Pump Order for DME - ONLY FOR DME      -IUP at 39w2d:     Prenatal labs reviewed    Reviewed plans for getting to the hospital    Discussed on-call coverage    RTC in 1 weeks or sooner with problems.      Sully Braney MD    "

## 2022-08-08 ENCOUNTER — PRENATAL OFFICE VISIT (OUTPATIENT)
Dept: FAMILY MEDICINE | Facility: CLINIC | Age: 27
End: 2022-08-08
Payer: COMMERCIAL

## 2022-08-08 VITALS
BODY MASS INDEX: 37.09 KG/M2 | RESPIRATION RATE: 16 BRPM | HEIGHT: 59 IN | HEART RATE: 92 BPM | TEMPERATURE: 98 F | DIASTOLIC BLOOD PRESSURE: 68 MMHG | SYSTOLIC BLOOD PRESSURE: 110 MMHG | WEIGHT: 184 LBS | OXYGEN SATURATION: 98 %

## 2022-08-08 DIAGNOSIS — O48.0 POST-TERM PREGNANCY, 40-42 WEEKS OF GESTATION: Primary | ICD-10-CM

## 2022-08-08 DIAGNOSIS — Z20.822 ENCOUNTER FOR LABORATORY TESTING FOR COVID-19 VIRUS: ICD-10-CM

## 2022-08-08 DIAGNOSIS — O24.410 DIET CONTROLLED GESTATIONAL DIABETES MELLITUS (GDM) IN THIRD TRIMESTER: ICD-10-CM

## 2022-08-08 DIAGNOSIS — O09.90 HIGH-RISK PREGNANCY, UNSPECIFIED TRIMESTER: ICD-10-CM

## 2022-08-08 PROCEDURE — 99207 PR PRENATAL VISIT: CPT | Performed by: FAMILY MEDICINE

## 2022-08-08 NOTE — PATIENT INSTRUCTIONS
Call around 6:30am on Saturday  Plan to arrive around 7:30am to [facility]  Enter in main entrance and check in at front    Plan to come for Covid-19 swab to [facility] this Friday.

## 2022-08-08 NOTE — PROGRESS NOTES
"SUBJECTIVE:   at 40w2d. Estimated Date of Delivery: Aug 6, 2022.  She is doing well. She denies vaginal bleeding, leakage of fluid, or urinary symptoms. Fetal movement is present. She is having some more contractions but nothing regular or painful.  Concerns: No concerns- prefers to wait for spontaneous labor. Has had less of an appetite recently- feels full faster. Does not often eat until early afternoon. Fasting sugars- two elevations (100's), otherwise all normal fasting and post-prandials  ROS  Negative except as noted above in HPI  OBJECTIVE:  Blood pressure 110/68, pulse 92, temperature 98  F (36.7  C), temperature source Oral, resp. rate 16, height 1.499 m (4' 11\"), weight 83.5 kg (184 lb), last menstrual period 10/21/2021, SpO2 98 %, not currently breastfeeding.  Gen: Comfortable, no acute distress.  Abd: Gravid, non-tender  See OB Vitals flowsheet.  NST: baseline 140, moderate variability, accels, no decels. [place]: quiet.   ASSESSMENT/PLAN:  Fan was seen today for prenatal care.    Diagnoses and all orders for this visit:    Post-term pregnancy, 40-42 weeks of gestation    Diet controlled gestational diabetes mellitus (GDM) in third trimester: Only few elevated fasting sugars- no more than 2 per week and very close to goal (). All other readings at goal. Growth US normal. NST reactive.     High-risk pregnancy, unspecified trimester    Encounter for laboratory testing for COVID-19 virus: Planning for post-dates induction at 41w0d.  -     Asymptomatic COVID-19 Virus (Coronavirus) by PCR; Future    Other orders  -     Fetal Non Stress Test by MD/RN      -IUP at 40w2d:     Prenatal labs reviewed    Reviewed post-dates induction. Cervix borderline favorable- bishops 5. Reviewed options for induction- patient elects to wait for spontaneously labor but is amenable to induction at 41w0d. Ideally, patient would be favorable and plan for pitocin induction.     Scheduled lab-only visit for " Covid-19 swab on Friday, in anticipation of induction Saturday.        Sully Barney MD

## 2022-08-09 ENCOUNTER — HOSPITAL ENCOUNTER (INPATIENT)
Facility: HOSPITAL | Age: 27
LOS: 3 days | Discharge: HOME HEALTH | End: 2022-08-12
Attending: FAMILY MEDICINE | Admitting: FAMILY MEDICINE
Payer: COMMERCIAL

## 2022-08-09 PROBLEM — Z36.89 ENCOUNTER FOR TRIAGE IN PREGNANT PATIENT: Status: ACTIVE | Noted: 2022-08-09

## 2022-08-09 LAB — SARS-COV-2 RNA RESP QL NAA+PROBE: NEGATIVE

## 2022-08-09 PROCEDURE — 258N000003 HC RX IP 258 OP 636: Performed by: FAMILY MEDICINE

## 2022-08-09 PROCEDURE — 86850 RBC ANTIBODY SCREEN: CPT | Performed by: FAMILY MEDICINE

## 2022-08-09 PROCEDURE — U0003 INFECTIOUS AGENT DETECTION BY NUCLEIC ACID (DNA OR RNA); SEVERE ACUTE RESPIRATORY SYNDROME CORONAVIRUS 2 (SARS-COV-2) (CORONAVIRUS DISEASE [COVID-19]), AMPLIFIED PROBE TECHNIQUE, MAKING USE OF HIGH THROUGHPUT TECHNOLOGIES AS DESCRIBED BY CMS-2020-01-R: HCPCS | Performed by: FAMILY MEDICINE

## 2022-08-09 PROCEDURE — 120N000001 HC R&B MED SURG/OB

## 2022-08-09 PROCEDURE — 86780 TREPONEMA PALLIDUM: CPT | Performed by: FAMILY MEDICINE

## 2022-08-09 PROCEDURE — 250N000013 HC RX MED GY IP 250 OP 250 PS 637: Performed by: FAMILY MEDICINE

## 2022-08-09 PROCEDURE — 36415 COLL VENOUS BLD VENIPUNCTURE: CPT | Performed by: FAMILY MEDICINE

## 2022-08-09 RX ORDER — NALOXONE HYDROCHLORIDE 0.4 MG/ML
0.2 INJECTION, SOLUTION INTRAMUSCULAR; INTRAVENOUS; SUBCUTANEOUS
Status: DISCONTINUED | OUTPATIENT
Start: 2022-08-09 | End: 2022-08-10 | Stop reason: HOSPADM

## 2022-08-09 RX ORDER — MISOPROSTOL 200 UG/1
400 TABLET ORAL
Status: DISCONTINUED | OUTPATIENT
Start: 2022-08-09 | End: 2022-08-10 | Stop reason: HOSPADM

## 2022-08-09 RX ORDER — MORPHINE SULFATE 10 MG/ML
10 INJECTION, SOLUTION INTRAMUSCULAR; INTRAVENOUS
Status: ACTIVE | OUTPATIENT
Start: 2022-08-09 | End: 2022-08-09

## 2022-08-09 RX ORDER — OXYTOCIN/0.9 % SODIUM CHLORIDE 30/500 ML
340 PLASTIC BAG, INJECTION (ML) INTRAVENOUS CONTINUOUS PRN
Status: DISCONTINUED | OUTPATIENT
Start: 2022-08-09 | End: 2022-08-10 | Stop reason: HOSPADM

## 2022-08-09 RX ORDER — HYDROXYZINE HYDROCHLORIDE 50 MG/1
100 TABLET, FILM COATED ORAL
Status: COMPLETED | OUTPATIENT
Start: 2022-08-09 | End: 2022-08-09

## 2022-08-09 RX ORDER — NALOXONE HYDROCHLORIDE 0.4 MG/ML
0.4 INJECTION, SOLUTION INTRAMUSCULAR; INTRAVENOUS; SUBCUTANEOUS
Status: DISCONTINUED | OUTPATIENT
Start: 2022-08-09 | End: 2022-08-12 | Stop reason: HOSPADM

## 2022-08-09 RX ORDER — METOCLOPRAMIDE HYDROCHLORIDE 5 MG/ML
10 INJECTION INTRAMUSCULAR; INTRAVENOUS EVERY 6 HOURS PRN
Status: DISCONTINUED | OUTPATIENT
Start: 2022-08-09 | End: 2022-08-10 | Stop reason: HOSPADM

## 2022-08-09 RX ORDER — NALOXONE HYDROCHLORIDE 0.4 MG/ML
0.2 INJECTION, SOLUTION INTRAMUSCULAR; INTRAVENOUS; SUBCUTANEOUS
Status: DISCONTINUED | OUTPATIENT
Start: 2022-08-09 | End: 2022-08-12 | Stop reason: HOSPADM

## 2022-08-09 RX ORDER — OXYTOCIN 10 [USP'U]/ML
10 INJECTION, SOLUTION INTRAMUSCULAR; INTRAVENOUS
Status: DISCONTINUED | OUTPATIENT
Start: 2022-08-09 | End: 2022-08-12 | Stop reason: HOSPADM

## 2022-08-09 RX ORDER — CARBOPROST TROMETHAMINE 250 UG/ML
250 INJECTION, SOLUTION INTRAMUSCULAR
Status: DISCONTINUED | OUTPATIENT
Start: 2022-08-09 | End: 2022-08-10 | Stop reason: HOSPADM

## 2022-08-09 RX ORDER — KETOROLAC TROMETHAMINE 30 MG/ML
30 INJECTION, SOLUTION INTRAMUSCULAR; INTRAVENOUS
Status: DISCONTINUED | OUTPATIENT
Start: 2022-08-09 | End: 2022-08-12 | Stop reason: HOSPADM

## 2022-08-09 RX ORDER — ONDANSETRON 2 MG/ML
4 INJECTION INTRAMUSCULAR; INTRAVENOUS EVERY 6 HOURS PRN
Status: DISCONTINUED | OUTPATIENT
Start: 2022-08-09 | End: 2022-08-10 | Stop reason: HOSPADM

## 2022-08-09 RX ORDER — OXYTOCIN/0.9 % SODIUM CHLORIDE 30/500 ML
100-340 PLASTIC BAG, INJECTION (ML) INTRAVENOUS CONTINUOUS PRN
Status: DISCONTINUED | OUTPATIENT
Start: 2022-08-09 | End: 2022-08-12 | Stop reason: HOSPADM

## 2022-08-09 RX ORDER — NALOXONE HYDROCHLORIDE 0.4 MG/ML
0.4 INJECTION, SOLUTION INTRAMUSCULAR; INTRAVENOUS; SUBCUTANEOUS
Status: DISCONTINUED | OUTPATIENT
Start: 2022-08-09 | End: 2022-08-10 | Stop reason: HOSPADM

## 2022-08-09 RX ORDER — OXYTOCIN 10 [USP'U]/ML
10 INJECTION, SOLUTION INTRAMUSCULAR; INTRAVENOUS
Status: DISCONTINUED | OUTPATIENT
Start: 2022-08-09 | End: 2022-08-10 | Stop reason: HOSPADM

## 2022-08-09 RX ORDER — PROCHLORPERAZINE 25 MG
25 SUPPOSITORY, RECTAL RECTAL EVERY 12 HOURS PRN
Status: DISCONTINUED | OUTPATIENT
Start: 2022-08-09 | End: 2022-08-10 | Stop reason: HOSPADM

## 2022-08-09 RX ORDER — ONDANSETRON 4 MG/1
4 TABLET, ORALLY DISINTEGRATING ORAL EVERY 6 HOURS PRN
Status: DISCONTINUED | OUTPATIENT
Start: 2022-08-09 | End: 2022-08-10 | Stop reason: HOSPADM

## 2022-08-09 RX ORDER — SODIUM CHLORIDE, SODIUM LACTATE, POTASSIUM CHLORIDE, CALCIUM CHLORIDE 600; 310; 30; 20 MG/100ML; MG/100ML; MG/100ML; MG/100ML
INJECTION, SOLUTION INTRAVENOUS CONTINUOUS
Status: DISCONTINUED | OUTPATIENT
Start: 2022-08-10 | End: 2022-08-10 | Stop reason: HOSPADM

## 2022-08-09 RX ORDER — PROCHLORPERAZINE MALEATE 10 MG
10 TABLET ORAL EVERY 6 HOURS PRN
Status: DISCONTINUED | OUTPATIENT
Start: 2022-08-09 | End: 2022-08-10 | Stop reason: HOSPADM

## 2022-08-09 RX ORDER — MISOPROSTOL 200 UG/1
800 TABLET ORAL
Status: DISCONTINUED | OUTPATIENT
Start: 2022-08-09 | End: 2022-08-10 | Stop reason: HOSPADM

## 2022-08-09 RX ORDER — IBUPROFEN 800 MG/1
800 TABLET, FILM COATED ORAL
Status: DISCONTINUED | OUTPATIENT
Start: 2022-08-09 | End: 2022-08-12 | Stop reason: HOSPADM

## 2022-08-09 RX ORDER — LIDOCAINE 40 MG/G
CREAM TOPICAL
Status: DISCONTINUED | OUTPATIENT
Start: 2022-08-09 | End: 2022-08-10 | Stop reason: HOSPADM

## 2022-08-09 RX ORDER — METHYLERGONOVINE MALEATE 0.2 MG/ML
200 INJECTION INTRAVENOUS
Status: DISCONTINUED | OUTPATIENT
Start: 2022-08-09 | End: 2022-08-10 | Stop reason: HOSPADM

## 2022-08-09 RX ORDER — METOCLOPRAMIDE 10 MG/1
10 TABLET ORAL EVERY 6 HOURS PRN
Status: DISCONTINUED | OUTPATIENT
Start: 2022-08-09 | End: 2022-08-10 | Stop reason: HOSPADM

## 2022-08-09 RX ORDER — CITRIC ACID/SODIUM CITRATE 334-500MG
30 SOLUTION, ORAL ORAL
Status: DISCONTINUED | OUTPATIENT
Start: 2022-08-09 | End: 2022-08-10 | Stop reason: HOSPADM

## 2022-08-09 RX ADMIN — HYDROXYZINE HYDROCHLORIDE 100 MG: 50 TABLET ORAL at 21:04

## 2022-08-09 RX ADMIN — SODIUM CHLORIDE, POTASSIUM CHLORIDE, SODIUM LACTATE AND CALCIUM CHLORIDE: 600; 310; 30; 20 INJECTION, SOLUTION INTRAVENOUS at 23:54

## 2022-08-09 ASSESSMENT — ACTIVITIES OF DAILY LIVING (ADL)
CONCENTRATING,_REMEMBERING_OR_MAKING_DECISIONS_DIFFICULTY: NO
FALL_HISTORY_WITHIN_LAST_SIX_MONTHS: NO
DIFFICULTY_COMMUNICATING: NO
WALKING_OR_CLIMBING_STAIRS_DIFFICULTY: NO
WEAR_GLASSES_OR_BLIND: NO
DIFFICULTY_EATING/SWALLOWING: NO
TOILETING_ISSUES: NO
DOING_ERRANDS_INDEPENDENTLY_DIFFICULTY: NO
CHANGE_IN_FUNCTIONAL_STATUS_SINCE_ONSET_OF_CURRENT_ILLNESS/INJURY: NO
ADLS_ACUITY_SCORE: 18
ADLS_ACUITY_SCORE: 18
DRESSING/BATHING_DIFFICULTY: NO

## 2022-08-09 NOTE — LETTER
August 12, 2022      Fan Duran  [address] [place] [address]        To Whom It May Concern:    Fan Duran was hospitalized 8/9/22-8/12/22. She delivered on 8/10/22.  Please excuse her , Sim Jarrett, from work for this reason.  He was here in this hospital with his wife.          Sincerely,        Alda Coppola MD

## 2022-08-10 LAB
ABO/RH(D): NORMAL
ANTIBODY SCREEN: NEGATIVE
GLUCOSE BLDC GLUCOMTR-MCNC: 116 MG/DL (ref 70–99)
GLUCOSE BLDC GLUCOMTR-MCNC: 135 MG/DL (ref 70–99)
GLUCOSE BLDC GLUCOMTR-MCNC: 95 MG/DL (ref 70–99)
HOLD SPECIMEN: NORMAL
SPECIMEN EXPIRATION DATE: NORMAL
T PALLIDUM AB SER QL: NONREACTIVE

## 2022-08-10 PROCEDURE — 10S0XZZ REPOSITION PRODUCTS OF CONCEPTION, EXTERNAL APPROACH: ICD-10-PCS | Performed by: FAMILY MEDICINE

## 2022-08-10 PROCEDURE — 120N000001 HC R&B MED SURG/OB

## 2022-08-10 PROCEDURE — 250N000009 HC RX 250: Performed by: FAMILY MEDICINE

## 2022-08-10 PROCEDURE — 250N000013 HC RX MED GY IP 250 OP 250 PS 637: Performed by: FAMILY MEDICINE

## 2022-08-10 PROCEDURE — 722N000001 HC LABOR CARE VAGINAL DELIVERY SINGLE

## 2022-08-10 PROCEDURE — 59400 OBSTETRICAL CARE: CPT | Performed by: FAMILY MEDICINE

## 2022-08-10 PROCEDURE — 10907ZC DRAINAGE OF AMNIOTIC FLUID, THERAPEUTIC FROM PRODUCTS OF CONCEPTION, VIA NATURAL OR ARTIFICIAL OPENING: ICD-10-PCS | Performed by: FAMILY MEDICINE

## 2022-08-10 PROCEDURE — 250N000011 HC RX IP 250 OP 636: Performed by: FAMILY MEDICINE

## 2022-08-10 RX ORDER — MODIFIED LANOLIN
OINTMENT (GRAM) TOPICAL
Status: DISCONTINUED | OUTPATIENT
Start: 2022-08-10 | End: 2022-08-12 | Stop reason: HOSPADM

## 2022-08-10 RX ORDER — NICOTINE POLACRILEX 4 MG
15-30 LOZENGE BUCCAL
Status: DISCONTINUED | OUTPATIENT
Start: 2022-08-10 | End: 2022-08-12 | Stop reason: HOSPADM

## 2022-08-10 RX ORDER — OXYTOCIN/0.9 % SODIUM CHLORIDE 30/500 ML
1-24 PLASTIC BAG, INJECTION (ML) INTRAVENOUS CONTINUOUS
Status: DISCONTINUED | OUTPATIENT
Start: 2022-08-10 | End: 2022-08-10 | Stop reason: HOSPADM

## 2022-08-10 RX ORDER — ACETAMINOPHEN 325 MG/1
650 TABLET ORAL EVERY 4 HOURS PRN
Status: DISCONTINUED | OUTPATIENT
Start: 2022-08-10 | End: 2022-08-12 | Stop reason: HOSPADM

## 2022-08-10 RX ORDER — OXYTOCIN 10 [USP'U]/ML
10 INJECTION, SOLUTION INTRAMUSCULAR; INTRAVENOUS
Status: DISCONTINUED | OUTPATIENT
Start: 2022-08-10 | End: 2022-08-12 | Stop reason: HOSPADM

## 2022-08-10 RX ORDER — CARBOPROST TROMETHAMINE 250 UG/ML
250 INJECTION, SOLUTION INTRAMUSCULAR
Status: DISCONTINUED | OUTPATIENT
Start: 2022-08-10 | End: 2022-08-12 | Stop reason: HOSPADM

## 2022-08-10 RX ORDER — DEXTROSE MONOHYDRATE 25 G/50ML
25-50 INJECTION, SOLUTION INTRAVENOUS
Status: DISCONTINUED | OUTPATIENT
Start: 2022-08-10 | End: 2022-08-12 | Stop reason: HOSPADM

## 2022-08-10 RX ORDER — METHYLERGONOVINE MALEATE 0.2 MG/ML
200 INJECTION INTRAVENOUS
Status: DISCONTINUED | OUTPATIENT
Start: 2022-08-10 | End: 2022-08-12 | Stop reason: HOSPADM

## 2022-08-10 RX ORDER — IBUPROFEN 800 MG/1
800 TABLET, FILM COATED ORAL EVERY 6 HOURS PRN
Status: DISCONTINUED | OUTPATIENT
Start: 2022-08-10 | End: 2022-08-12 | Stop reason: HOSPADM

## 2022-08-10 RX ORDER — MISOPROSTOL 200 UG/1
800 TABLET ORAL
Status: DISCONTINUED | OUTPATIENT
Start: 2022-08-10 | End: 2022-08-12 | Stop reason: HOSPADM

## 2022-08-10 RX ORDER — DOCUSATE SODIUM 100 MG/1
100 CAPSULE, LIQUID FILLED ORAL DAILY
Status: DISCONTINUED | OUTPATIENT
Start: 2022-08-10 | End: 2022-08-12 | Stop reason: HOSPADM

## 2022-08-10 RX ORDER — OXYTOCIN/0.9 % SODIUM CHLORIDE 30/500 ML
340 PLASTIC BAG, INJECTION (ML) INTRAVENOUS CONTINUOUS PRN
Status: DISCONTINUED | OUTPATIENT
Start: 2022-08-10 | End: 2022-08-12 | Stop reason: HOSPADM

## 2022-08-10 RX ORDER — HYDROCORTISONE 25 MG/G
CREAM TOPICAL 3 TIMES DAILY PRN
Status: DISCONTINUED | OUTPATIENT
Start: 2022-08-10 | End: 2022-08-12 | Stop reason: HOSPADM

## 2022-08-10 RX ORDER — FENTANYL CITRATE 50 UG/ML
100 INJECTION, SOLUTION INTRAMUSCULAR; INTRAVENOUS
Status: DISCONTINUED | OUTPATIENT
Start: 2022-08-10 | End: 2022-08-10 | Stop reason: HOSPADM

## 2022-08-10 RX ORDER — SODIUM CHLORIDE, SODIUM LACTATE, POTASSIUM CHLORIDE, CALCIUM CHLORIDE 600; 310; 30; 20 MG/100ML; MG/100ML; MG/100ML; MG/100ML
INJECTION, SOLUTION INTRAVENOUS CONTINUOUS PRN
Status: DISCONTINUED | OUTPATIENT
Start: 2022-08-10 | End: 2022-08-10 | Stop reason: HOSPADM

## 2022-08-10 RX ORDER — MISOPROSTOL 200 UG/1
400 TABLET ORAL
Status: DISCONTINUED | OUTPATIENT
Start: 2022-08-10 | End: 2022-08-12 | Stop reason: HOSPADM

## 2022-08-10 RX ORDER — BISACODYL 10 MG
10 SUPPOSITORY, RECTAL RECTAL DAILY PRN
Status: DISCONTINUED | OUTPATIENT
Start: 2022-08-10 | End: 2022-08-12 | Stop reason: HOSPADM

## 2022-08-10 RX ORDER — LIDOCAINE 40 MG/G
CREAM TOPICAL
Status: DISCONTINUED | OUTPATIENT
Start: 2022-08-10 | End: 2022-08-10 | Stop reason: HOSPADM

## 2022-08-10 RX ADMIN — ACETAMINOPHEN 650 MG: 325 TABLET ORAL at 21:46

## 2022-08-10 RX ADMIN — IBUPROFEN 800 MG: 800 TABLET ORAL at 09:15

## 2022-08-10 RX ADMIN — DOCUSATE SODIUM 100 MG: 100 CAPSULE, LIQUID FILLED ORAL at 09:15

## 2022-08-10 RX ADMIN — KETOROLAC TROMETHAMINE 30 MG: 30 INJECTION, SOLUTION INTRAMUSCULAR; INTRAVENOUS at 03:27

## 2022-08-10 RX ADMIN — Medication 1 MILLI-UNITS/MIN: at 02:37

## 2022-08-10 RX ADMIN — IBUPROFEN 800 MG: 800 TABLET ORAL at 16:38

## 2022-08-10 ASSESSMENT — ACTIVITIES OF DAILY LIVING (ADL)
ADLS_ACUITY_SCORE: 18

## 2022-08-10 NOTE — PLAN OF CARE
Problem: Bleeding (Labor)  Goal: Hemostasis  Outcome: Met     Problem: Change in Fetal Wellbeing (Labor)  Goal: Stable Fetal Wellbeing  Outcome: Met     Problem: Delayed Labor Progression (Labor)  Goal: Effective Progression to Delivery  Outcome: Met     Problem: Infection (Labor)  Goal: Absence of Infection Signs and Symptoms  Outcome: Met     Problem: Plan of Care - These are the overarching goals to be used throughout the patient stay.    Goal: Absence of Hospital-Acquired Illness or Injury  Intervention: Prevent and Manage VTE (Venous Thromboembolism) Risk  Recent Flowsheet Documentation  Taken 8/10/2022 0319 by Destini Son, RN  Activity Management: up ad dieter

## 2022-08-10 NOTE — L&D DELIVERY NOTE
OB Vaginal Delivery Note    Fan Duran MRN# [id number]   Age: 27 year old YOB: 1995       GA: 40w4d  GP:   Labor Complications: None   Delivery QBL: 50 mL  Delivery Type: Vaginal, Spontaneous   ROM to Delivery Time: (Delivered) Hours: 2 Minutes: 52  [place] Weight:     1 Minute 5 Minute 10 Minute   Apgar Totals:            LINDA AWAN;JANES RAMOS;MARCI SOLER     Delivery Details:  Fan Duran, a 27 year old  female delivered a viable infant. On arrival to L&D, patient was noted to be in prodromal labor with irregular contraction pattern. Cervix was 4 cm dilated. She was given oral hydroxyzine for therapeutic rest and then progressed to 5-6 cm. AROM was performed to augment labor due to ineffective contraction pattern. She progressed to complete.  Delivery was via vaginal, spontaneous  to a sterile field under none  anesthesia. Infant delivered in vertex    occiput  anterior  position. Patient delivered at the end of a contraction, but anterior shoulder delivered with gentle downward traction and McRobert's maneuver. The cord was clamped x 2, cut by father of baby. Cord blood was obtained in routine fashion    Cord complications: nuchal , reduced  Placenta delivered at 8/10/2022  3:03 AM . Placental disposition was Hospital disposal . Fundal massage performed and fundus found to be firm.     Episiotomy: none    Perineum, vagina, cervix were inspected, and the following lacerations were noted:   Perineal lacerations: none                  Excellent hemostasis was noted. Needle count correct. Infant and patient in delivery room in good and stable condition.        Niurka Duran-Fan [[id number]]    Labor Event Times    Labor onset date: 22 Onset time: 11:26 PM   Dilation complete date: 8/10/22 Complete time:  1:30 AM   Start pushing date/time: 8/10/2022 0130      Labor Events     labor?: No   steroids: None  Labor Type:  AROM  Predominate monitoring during 1st stage: continuous electronic fetal monitoring     Antibiotics received during labor?: No     Rupture date/time: 8/10/22 0006   Rupture type: Artificial Rupture of Membranes  Fluid color: Clear  Fluid odor: Normal     Augmentation: AROM  Indications for augmentation: Ineffective Contraction Pattern  1:1 continuous labor support provided by?: RN       Delivery/Placenta Date and Time    Delivery Date: 8/10/22 Delivery Time:  2:58 AM   Placenta Date/Time: 8/10/2022  3:03 AM  Oxytocin given at the time of delivery: after delivery of baby  Delivering clinician: Sully Barney MD   Other personnel present at delivery:  Provider Role   Destini Son, RN Registered Nurse   Brooke Brush RN Lensing, Cynthia L, RN          Vaginal Counts     Initial count performed by 2 team members:  Two Team Members   Sully Brush       [place] Suture Needles Sponges (RETIRED) Instruments   Initial counts 0 0 5    Added to count       Relief counts       Final counts   5          Placed during labor Accounted for at the end of labor   FSE No NA   IUPC No NA   Cervidil No NA              Final count performed by 2 team members:  Two Team Members   MD Destini Anton Rn      Final count correct?: Yes     Cord    Cord Complications: Nuchal   Nuchal Intervention: reduced         Nuchal cord description: tight nuchal cord            Labor Events and Shoulder Dystocia    Fetal Tracing Prior to Delivery: Category 2  Fetal Tracing Comments: variable decelerations with contractions  Shoulder dystocia present?: Neg     Delivery (Maternal) (Provider to Complete) ([id number])    Episiotomy: None  Perineal lacerations: None    Repair suture: None  Genital tract inspection done: Pos     Blood Loss  Mother: Renee Farzadmich KIRAN #[id number]   Start of Mother's Information    Delivery Blood Loss  08/09/22 2326 - 08/10/22 0319    Delivery QBL (mL) Hospital Encounter 50 mL     Total  50 mL         End of Mother's Information  Mother: Fan Duran #[id number]          Delivery - Provider to Complete ([id number])    Delivering clinician: Sully Barney MD  Attempted Delivery Types (Choose all that apply): Spontaneous Vaginal Delivery  Delivery Type (Choose the 1 that will go to the Birth History): Vaginal, Spontaneous                   Other personnel:  Provider Role   Destini Son RN Registered Nurse   Brooke Brush RN Lensing, Cynthia L, RN                 Placenta    Date/Time: 8/10/2022  3:03 AM  Removal: Spontaneous  Disposition: Hospital disposal           Anesthesia    Method: None                Presentation and Position    Presentation: Vertex     Occiput Anterior                 Sully Barney MD

## 2022-08-10 NOTE — PROGRESS NOTES
"Fan Duran presented self to [facility] with c/o contraction that \"do not let her rest since last night\".  EFM applied.  FHT category: I.  Contraction pattern observed: q 7-9min, palpate moderate   /77 (BP Location: Right arm, Patient Position: Semi-Kaur's, Cuff Size: Adult Regular)   Temp 99.4  F (37.4  C) (Oral)   Resp 16   LMP 10/21/2021 (Approximate)   Breastfeeding Yes  .  Cervical exam performed as documented. Porter 6/10.     Dr. Barney notified of above, orders received to allow therapeutic rest, and evaluate labor in a few hours.     Gypsy Jacobs RN   "

## 2022-08-10 NOTE — PLAN OF CARE
Problem: Pain (Postpartum Vaginal Delivery)  Goal: Acceptable Pain Control  Outcome: Ongoing, Progressing     Problem: Adjustment to Role Transition (Postpartum Vaginal Delivery)  Goal: Successful Maternal Role Transition  Outcome: Ongoing, Progressing     Problem: Bleeding (Postpartum Vaginal Delivery)  Goal: Hemostasis  Outcome: Ongoing, Progressing     Pt stable and VSS. Fundus firm and bleeding light with no clots. Denies pain.

## 2022-08-10 NOTE — PLAN OF CARE
Problem: Plan of Care - These are the overarching goals to be used throughout the patient stay.    Goal: Plan of Care Review/Shift Note  Description: The Plan of Care Review/Shift note should be completed every shift.  The Outcome Evaluation is a brief statement about your assessment that the patient is improving, declining, or no change.  This information will be displayed automatically on your shift note.  Outcome: Ongoing, Progressing   Fan is 6 hours post delivery, she has not been out of bed yet. This writer into room to do 6 hour check and to get Fan out of bed. Fan is currently asking to not get up. POC made with patient to eat her breakfast that had just been delivered and we will get up to bathroom at around 10am. This writer explained why its important to get out of bed and empty bladder. Fan agrees to plan.  Fabiola Liang RN  8/10/2022 9:29 AM

## 2022-08-10 NOTE — H&P
Maternal Admission H&P  [facility]  Date of Admission: 2022  Date of Service: 8/10/2022    Name      Fan Duran         1995  MRN       [id number]  PCP        Gloria Berg at [facility], [phone].    ________________________________________________________________________    Assessment and Plan:  27 year old  at 40w4d with pregnancy complicated by diet controlled GDM, presenting with contractions, now in active labor s/p AROM with clear fluid. FHT category 1. Maternal vitals stable.     Baby AGA. Anticipate .    Group B strep: negative    Plans to breast and bottle feed    Postpartum contra: depo  ________________________________________________________________________    Chief Complaint: active labor management.    HPI: Fan Duran is a 27 year old woman at 40w4d, based on an Estimated Date of Delivery: Aug 6, 2022. She presents with contractions which started yesterday and she did not get much sleep. On arrival, she was 4/60/-3. She was given oral vistaril for therapeutic rest, but she continued to have more intense contractions and on recheck was 5-6/90/-2. On my arrival to hospital, she was 6-7 cm with bulging bag.     Pregnancy complicated by GDMA1, well controlled, obesity (pre-pregnancy weight 34). Growth US have showed normal interval growth (36w5d 67%ile).     Patient Active Problem List    Diagnosis Date Noted     Encounter for triage in pregnant patient 2022     Priority: Medium     Labor and delivery affected by pelvic inlet contraction 2022     Priority: Medium     Gestational diabetes 2022     Priority: Medium     High-risk pregnancy, unspecified trimester 2022     Priority: Medium     Class 1 obesity without serious comorbidity in adult, unspecified BMI, unspecified obesity type 2022     Priority: Medium      OB History    Para Term  AB Living   4 2  2 0 1 2   SAB IAB Ectopic Multiple Live Births   0 0 0 0 2      # Outcome Date GA Lbr Mauricio/2nd Weight Sex Delivery Anes PTL Lv   4 Current            3 AB 09/30/19           2 Term 04/14/17   3.175 kg (7 lb) M Vag-Spont   FATMATA      Birth Comments: Born in [facility]   1 Term 07/11/12   2.948 kg (6 lb 8 oz) F Vag-Vacuum   FATMATA      Birth Comments: [facility]. Vacuum assisted delivery for failure to progress in 2nd stage     Review of Systems Negative except what is noted in HPI.   Past Medical History:   Diagnosis Date     Asthma 2019     H/O: iron deficiency anemia     as a young child     Miscarriage 10/1/2019     Overweight       Past Surgical History:   Procedure Laterality Date     NO PAST SURGERIES     Patient has no known allergies.  Family History   Problem Relation Age of Onset     Depression Mother      Cancer Father         esophageal cancer - getting treatment; he is a smoker     Hepatitis Father         C     No Known Problems Other      No Known Problems Son      Social History     Socioeconomic History     Marital status:      Spouse name: Not on file     Number of children: Not on file     Years of education: Not on file     Highest education level: Not on file   Occupational History     Not on file   Tobacco Use     Smoking status: Never Smoker     Smokeless tobacco: Never Used     Tobacco comment: passive smoker - parents smoked   Substance and Sexual Activity     Alcohol use: Never     Drug use: Never     Sexual activity: Yes     Partners: Male     Birth control/protection: Injection     Comment: SP = 1   Other Topics Concern     Not on file   Social History Narrative    As of 9/29/19            Arrival in [facility]: [address] [place], [place]  Moved to [place] 8/26/2019            Marrital status:  to  Taw; not abusive but he's forgetful/ignorant; he drinks sometimes with friends but is not abusive then either        Living situation: lives with husb and and 2 kids (one son  -Chetan Luiza, daughter Rozina Ehsaypaw)              Languages spoken: Penelope, some English          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, 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       works with [employer] (as of 11/2019) - 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              Place of bir th: [place]  Years in a refugee camp: 15            Family not in [place]: Paternal grandparents - in [place]      Family in [place]: Maternal grandpa, maternal aunt, parents            Education previous to [place] to the [place]: to grade 10      Education started in the [place]: finished high  school in  ([employer] in [place], [place])              Ad[employer]: [employer]        Is a [place] citizen       Social Determinants of Health     Financial Resource Strain: Not on file   Food Insecurity: Not on file   Transportation Needs: Not on file   Physical Activity: Not on file   Stress: Not on file   Social Connections: Not on file   Intimate Partner Violence: Not on file   Housing Stability: Not on file     Prior to Admission Medication List  Medications Prior to Admission   Medication Sig Dispense Refill Last Dose     alcohol swab prep pads Use to swab area of injection/dustin as directed. 100 each 3      blood glucose (NO BRAND SPECIFIED) test strip Use to test blood sugar 4 times daily or as directed. To accompany: Blood Glucose Monitor Brands: per insurance. 200 strip 6      blood glucose monitoring (NO BRAND SPECIFIED) meter device kit Use to test blood sugar 4 times daily or as directed. Preferred blood glucose meter OR supplies to accompany: Blood Glucose Monitor Brands: per insurance. 1 kit 0      magnesium 500 MG TABS Take 1 tablet by mouth At Bedtime 90 tablet 3      Prenatal Vit-Fe Fumarate-FA (PRENATAL VITAMIN) 27-0.8 MG TABS Take 1 tablet by mouth daily 90 tablet 3      thin (NO BRAND SPECIFIED) lancets Use with lanceting device. To accompany: Blood Glucose Monitor Brands: per insurance. 200 each 6       Allergies  No Known Allergies  Immunization History   Administered Date(s)  Administered     COVID-19,PF,Moderna 07/23/2021, 08/20/2021     COVID-19,PF,Moderna Booster 02/16/2022     Flu, Unspecified 11/09/2010, 09/12/2011     HPV Quadrivalent 03/11/2011, 05/11/2011, 09/12/2011     HPV9 10/29/2019, 01/02/2020     HepA, Unspecified 08/04/2010, 03/11/2011     HepB, Unspecified 08/04/2010, 09/09/2010, 12/09/2010     Hepatitis A Immunity: Titer/MD Dx 09/27/2019     Hepatitis B Immunity: Titer 09/27/2019     Influenza Vaccine IM > 6 months Valent IIV4 (Alfuria,Fluzone) 09/29/2020, 10/06/2021     Influenza Vaccine, 6+MO IM (QUADRIVALENT W/PRESERVATIVES) 10/15/2019     MMR 08/04/2010, 09/09/2010     Meningococcal Mcv4 Conjugate,unspecified  08/04/2010     Poliovirus, inactivated (IPV) 08/04/2010, 09/09/2010, 11/09/2010     TD (ADULT, 7+) 10/29/2019     Td,adult,historic,unspecified 09/09/2010, 03/11/2011     Tdap (Adacel,Boostrix) 08/04/2010, 05/25/2022     Varicella 08/04/2010, 09/09/2010     Varicella Immunity: Titer/MD Dx 09/27/2019, 10/02/2019      Physical Exam  Patient Vitals for the past 24 hrs:   BP Temp Temp src Resp   08/10/22 0000 -- 98.6  F (37  C) Oral 16   08/09/22 2012 -- 99.4  F (37.4  C) Oral --   08/09/22 1953 119/77 -- -- 16     Wt Readings from Last 1 Encounters:   08/08/22 83.5 kg (184 lb)   Prepregnancy: 76.9 kg (169 lb 8 oz), BMI 34.22. Total gain: 6.577 kg (14 lb 8 oz) (expected gain: 5 kg (11 lb)-9 kg (19 lb)).    HEART: RRR, no murmur  LUNGS: CTA bilaterally  Fetal Heart Tones: Baseline 140 bpm, variability moderate (6-25 bpm), early decelerations. Reactive.  CONTRACTIONS:  regular, every 3-6 minutes.  CERVIX: dilation 6-7 cm , 90% effaced, soft, and -2 station.  FLUID: Clear with AROM  Fetal Presentation: vertex.  Labs  Group B Strep PCR   Date Value Ref Range Status   07/13/2022 Negative Negative Final     Comment:     Presumed negative for Streptococcus agalactiae (Group B Streptococcus) or the number of organisms may be below the limit of detection of the assay.       Antibody Screen   Date Value Ref Range Status   01/06/2022 Negative Negative Final     Hepatitis B Surface Antigen   Date Value Ref Range Status   01/06/2022 Nonreactive Nonreactive Final     Neisseria gonorrhoeae   Date Value Ref Range Status   01/03/2020 Negative Negative Final     Hemoglobin   Date Value Ref Range Status   05/13/2022 11.3 (L) 11.7 - 15.7 g/dL Final      Admission on 08/09/2022   Component Date Value Ref Range Status     SARS CoV2 PCR 08/09/2022 Negative  Negative Final    NEGATIVE: SARS-CoV-2 (COVID-19) RNA not detected, presumed negative.     GLUCOSE BY METER POCT 08/10/2022 95  70 - 99 mg/dL Final        Completed by:   Sully Barney MD  [facility]  8/10/2022 12:24 AM   used: Not needed.

## 2022-08-11 LAB — GLUCOSE BLDC GLUCOMTR-MCNC: 89 MG/DL (ref 70–99)

## 2022-08-11 PROCEDURE — 99207 PR SUBSEQUENT HOSPITAL CARE FOR MOTHER, 15 MINUTES: CPT | Performed by: FAMILY MEDICINE

## 2022-08-11 PROCEDURE — 250N000013 HC RX MED GY IP 250 OP 250 PS 637: Performed by: FAMILY MEDICINE

## 2022-08-11 PROCEDURE — 120N000001 HC R&B MED SURG/OB

## 2022-08-11 RX ORDER — OXYCODONE HYDROCHLORIDE 5 MG/1
5 TABLET ORAL EVERY 4 HOURS PRN
Status: DISCONTINUED | OUTPATIENT
Start: 2022-08-11 | End: 2022-08-12 | Stop reason: HOSPADM

## 2022-08-11 RX ADMIN — OXYCODONE HYDROCHLORIDE 5 MG: 5 TABLET ORAL at 19:58

## 2022-08-11 RX ADMIN — DOCUSATE SODIUM 100 MG: 100 CAPSULE, LIQUID FILLED ORAL at 09:00

## 2022-08-11 RX ADMIN — IBUPROFEN 800 MG: 800 TABLET ORAL at 16:44

## 2022-08-11 RX ADMIN — IBUPROFEN 800 MG: 800 TABLET ORAL at 23:34

## 2022-08-11 RX ADMIN — IBUPROFEN 800 MG: 800 TABLET ORAL at 06:19

## 2022-08-11 RX ADMIN — ACETAMINOPHEN 650 MG: 325 TABLET ORAL at 16:43

## 2022-08-11 RX ADMIN — IBUPROFEN 800 MG: 800 TABLET ORAL at 00:33

## 2022-08-11 RX ADMIN — ACETAMINOPHEN 650 MG: 325 TABLET ORAL at 23:33

## 2022-08-11 ASSESSMENT — ACTIVITIES OF DAILY LIVING (ADL)
ADLS_ACUITY_SCORE: 18

## 2022-08-11 NOTE — PROGRESS NOTES
Vaginal Delivery Postpartum Progress Note  Location: [facility]    Patient Name:  Fan Duran  :      1995  MRN:      [id number]      Assessment:  Normal postpartum course.    Having significant cramping.     Plan:  Continue current care. Will start aqua-k pad +/- oxycodone.      Subjective:  The patient reports severe uterine cramping that is not adequately controlled with ibuprofen and Tylenol.  Asked for heating pad, but was told we don't have one.    Voiding without difficulty, lochia normal, tolerating normal diet, and passing flatus.   The patient has no emotional concerns.  The baby is well and being fed by bottle.  She is interested in breastfeeding, but isn't sure how to do it.    Scheduled Meds:    docusate sodium  100 mg Oral Daily     Continuous Infusions:    - MEDICATION INSTRUCTIONS -       - MEDICATION INSTRUCTIONS -       oxytocin in 0.9% NaCl       oxytocin in 0.9% NaCl 100 mL/hr (08/10/22 0335)     PRN Meds:.acetaminophen, benzocaine-menthol, bisacodyl, carboprost, glucose **OR** dextrose **OR** glucagon, hydrocortisone (Perianal), ibuprofen, ketorolac **OR** ketorolac **OR** ibuprofen, lanolin, lidocaine (buffered or not buffered), methylergonovine, misoprostol **OR** misoprostol, naloxone **OR** naloxone **OR** naloxone **OR** naloxone, - MEDICATION INSTRUCTIONS -, - MEDICATION INSTRUCTIONS -, oxyCODONE, oxytocin in 0.9% NaCl, oxytocin in 0.9% NaCl, oxytocin, oxytocin, tranexamic acid, witch hazel-glycerin    Objective:  /69 (BP Location: Right arm)   Pulse 67   Temp 98.3  F (36.8  C) (Oral)   Resp 15   LMP 10/21/2021 (Approximate)   SpO2 98%   Breastfeeding Yes     .  Patient Vitals for the past 24 hrs:   BP Temp Temp src Pulse Resp SpO2   22 0814 115/69 98.3  F (36.8  C) Oral 67 15 98 %   22 0025 113/69 97.7  F (36.5  C) Oral 70 16 97 %   08/10/22 1704 102/59 98.2  F (36.8  C) Oral 70 17 98 %   08/10/22 1330 105/69 98.5  F (36.9  C) Oral 74 16 97 %        General:  Awake and alert, no acute distress  Cardiovascular:  Heart rate and rhythm regular, no murmur rub or josiah  Respiratory:  Clear to ausculation bilaterally.  Abdomen:  Soft and nontender.  The uterine fundus is firm and midline, about 2 fingerbreadths below umbulicus..     Hemoglobin   Date/Time Value Ref Range Status   2022 02:08 PM 11.3 (L) 11.7 - 15.7 g/dL Final   2022 10:30 AM 12.5 11.7 - 15.7 g/dL Final   2020 01:58 PM 14.4 12.0 - 16.0 g/dL Final         Hospital Labs:  Results for orders placed or performed during the hospital encounter of 22   Asymptomatic COVID-19 Virus (Coronavirus) by PCR Nasopharyngeal    Specimen: Nasopharyngeal; Swab   Result Value Ref Range    SARS CoV2 PCR Negative Negative   Treponema Abs w Reflex to RPR and Titer   Result Value Ref Range    Treponema Antibody Total Nonreactive Nonreactive   Glucose by meter   Result Value Ref Range    GLUCOSE BY METER POCT 95 70 - 99 mg/dL   Extra Purple Top Tube   Result Value Ref Range    Hold Specimen JIC    Glucose by meter   Result Value Ref Range    GLUCOSE BY METER POCT 135 (H) 70 - 99 mg/dL   Glucose by meter   Result Value Ref Range    GLUCOSE BY METER POCT 116 (H) 70 - 99 mg/dL   Glucose by meter   Result Value Ref Range    GLUCOSE BY METER POCT 89 70 - 99 mg/dL   Adult Type and Screen   Result Value Ref Range    ABO/RH(D) B POS     Antibody Screen Negative Negative    SPECIMEN EXPIRATION DATE [id number]           Provider:  Dr. Alda Coppola    Date:  2022  Time:  11:46 AM    Patient Name:  Fan Duran  :      1995  MRN:      [id number]

## 2022-08-11 NOTE — PLAN OF CARE
Patient vital signs and assessment findings were WNL. Pain is being managed with ibuprofen. Patient would like to discharge today.    Phyllis Thomas RN  8/11/2022 4:42 AM

## 2022-08-12 VITALS
OXYGEN SATURATION: 98 % | RESPIRATION RATE: 18 BRPM | SYSTOLIC BLOOD PRESSURE: 119 MMHG | DIASTOLIC BLOOD PRESSURE: 73 MMHG | HEART RATE: 60 BPM | TEMPERATURE: 97.8 F

## 2022-08-12 PROCEDURE — 250N000011 HC RX IP 250 OP 636: Performed by: FAMILY MEDICINE

## 2022-08-12 PROCEDURE — 99207 PR SUBSEQUENT HOSPITAL CARE FOR MOTHER, 15 MINUTES: CPT | Performed by: FAMILY MEDICINE

## 2022-08-12 PROCEDURE — 250N000013 HC RX MED GY IP 250 OP 250 PS 637: Performed by: FAMILY MEDICINE

## 2022-08-12 RX ORDER — ACETAMINOPHEN 325 MG/1
325-650 TABLET ORAL EVERY 6 HOURS PRN
Qty: 30 TABLET | Refills: 0 | Status: SHIPPED | OUTPATIENT
Start: 2022-08-12 | End: 2022-09-11

## 2022-08-12 RX ORDER — DOCUSATE SODIUM 100 MG/1
100 CAPSULE, LIQUID FILLED ORAL 2 TIMES DAILY PRN
Qty: 28 CAPSULE | Refills: 0 | Status: SHIPPED | OUTPATIENT
Start: 2022-08-12 | End: 2022-09-11

## 2022-08-12 RX ORDER — IBUPROFEN 800 MG/1
800 TABLET, FILM COATED ORAL EVERY 8 HOURS PRN
Qty: 30 TABLET | Refills: 0 | Status: SHIPPED | OUTPATIENT
Start: 2022-08-12 | End: 2022-09-11

## 2022-08-12 RX ORDER — MEDROXYPROGESTERONE ACETATE 150 MG/ML
150 INJECTION, SUSPENSION INTRAMUSCULAR
Status: DISCONTINUED | OUTPATIENT
Start: 2022-08-12 | End: 2022-08-12 | Stop reason: HOSPADM

## 2022-08-12 RX ORDER — OXYCODONE HYDROCHLORIDE 5 MG/1
5 TABLET ORAL EVERY 4 HOURS PRN
Qty: 6 TABLET | Refills: 0 | Status: SHIPPED | OUTPATIENT
Start: 2022-08-12 | End: 2022-10-05

## 2022-08-12 RX ADMIN — DOCUSATE SODIUM 100 MG: 100 CAPSULE, LIQUID FILLED ORAL at 09:48

## 2022-08-12 RX ADMIN — MEDROXYPROGESTERONE ACETATE 150 MG: 150 INJECTION, SUSPENSION, EXTENDED RELEASE INTRAMUSCULAR at 11:30

## 2022-08-12 RX ADMIN — ACETAMINOPHEN 650 MG: 325 TABLET ORAL at 06:38

## 2022-08-12 RX ADMIN — OXYCODONE HYDROCHLORIDE 5 MG: 5 TABLET ORAL at 00:53

## 2022-08-12 RX ADMIN — IBUPROFEN 800 MG: 800 TABLET ORAL at 06:38

## 2022-08-12 ASSESSMENT — ACTIVITIES OF DAILY LIVING (ADL)
ADLS_ACUITY_SCORE: 18

## 2022-08-12 NOTE — DISCHARGE SUMMARY
Maternal Discharge Summary  [facility]aternity Care  Date of Service: 2022    Name      Fan Duran         1995  MRN       [id number]  PCP        Gloria Medina at [facility], [phone].    ________________________________________________________________________    Assessment:  Fan Duran is a 27 year old now  s/p vaginal, spontaneous  at 40w4d on 8/10/22.      Discharge Plan:   1. Discharge to Home. Condition at Discharge:  stable  2. Physical activity: Regular.  3. Diet:  Regular.  4. Home care nurse: ordered for 2 days from now.  5. Lactation clinic appointment: prn.  6. Contraception:  Depo Provera will be given today prior to discharge.  7. Follow up with [facility] in 6 weeks.          Medication List      Started    acetaminophen 325 MG tablet  Commonly known as: TYLENOL  325-650 mg, Oral, EVERY 6 HOURS PRN     docusate sodium 100 MG capsule  Commonly known as: COLACE  100 mg, Oral, 2 TIMES DAILY PRN     ibuprofen 800 MG tablet  Commonly known as: ADVIL/MOTRIN  800 mg, Oral, EVERY 8 HOURS PRN     oxyCODONE 5 MG tablet  Commonly known as: ROXICODONE  5 mg, Oral, EVERY 4 HOURS PRN           ________________________________________________________________________    Admission Date:  2022  Discharge Date:  2022  Delivery Date:  8/10/2022  Gestational Age at Delivery: 40w4d    Principal Diagnosis: Labor and delivery  Delivery type: Vaginal, Spontaneous     Principal Problem:     (normal spontaneous vaginal delivery)  Active Problems:    Class 1 obesity without serious comorbidity in adult, unspecified BMI, unspecified obesity type    Gestational diabetes    Encounter for triage in pregnant patient    Labor and delivery affected by pelvic inlet contraction      Subjective:  Patient denies headache, dizziness, dyspnea, and leg pain. Ambulating and eating.    Discharge Exam:  Patient Vitals for the past  24 hrs:   BP Temp Temp src Pulse Resp SpO2   08/12/22 0807 119/73 97.8  F (36.6  C) Oral 60 18 98 %   08/11/22 2330 108/62 98.4  F (36.9  C) Oral 65 16 --   08/11/22 2105 105/69 98.6  F (37  C) Oral 78 18 98 %   08/11/22 1550 109/58 98.6  F (37  C) Oral 89 16 97 %     General - alert, comfortable  Heart - RRR, no murmurs  Lungs - CTA bilaterally  Abdomen - fundus firm, nontender, below umbilicus  Extremities - trace edema  Admission on 08/09/2022   Component Date Value Ref Range Status     SARS CoV2 PCR 08/09/2022 Negative  Negative Final    NEGATIVE: SARS-CoV-2 (COVID-19) RNA not detected, presumed negative.     Treponema Antibody Total 08/09/2022 Nonreactive  Nonreactive Final     ABO/RH(D) 08/09/2022 B POS   Final     Antibody Screen 08/09/2022 Negative  Negative Final     SPECIMEN EXPIRATION DATE 08/09/2022 [id number]   Final     GLUCOSE BY METER POCT 08/10/2022 95  70 - 99 mg/dL Final     Hold Specimen 08/10/2022 JIC   Final     GLUCOSE BY METER POCT 08/10/2022 135 (A) 70 - 99 mg/dL Final     GLUCOSE BY METER POCT 08/10/2022 116 (A) 70 - 99 mg/dL Final     GLUCOSE BY METER POCT 08/11/2022 89  70 - 99 mg/dL Final      Discharge Medications:   See medication reconciliation.     Completed by:   Alda Coppola MD  [facility]  8/12/2022 10:15 AM   used: Not needed.

## 2022-08-12 NOTE — PLAN OF CARE
Problem: Plan of Care - These are the overarching goals to be used throughout the patient stay.    Goal: Optimal Comfort and Wellbeing  Intervention: Monitor Pain and Promote Comfort  Recent Flowsheet Documentation  Taken 8/11/2022 1958 by Xiomara Caraballo, RN  Pain Management Interventions:   medication (see MAR)   distraction     Evangelinna remained stable all shift. Been rating abdominal cramps at 8/10. Oxycodone given with good results. T pump not available per stores, warm pack given and applied over abdomen. Up and about in room independently.

## 2022-08-12 NOTE — PROGRESS NOTES
"Outreach Nurse Note    Fan Duran  [id number]  1995    Chart reviewed, discharge plan discussed with patient, needs assessed. Patient requests home care visit, nurse visit planned for 22, Home Care Intake updated by this writer. NOTE: You need to call Fan's phone number twice in order for the call to go thru or just text to the number listed. (#[phone]) Follow-up post-delivery appointment planned in 2 & 6 weeks with Dr Martell at the [facility]-Patient to schedule (NOTE: Discharge summary states follow up in 6 weeks)  NOTE: Patient stated she did not have many clothes for  and only had a Tshirt for newborns discharge. Also asked if she could take hospital blanket home with her. This writer brought in a Bundles of Love and a Second Stork tshirt/long sleeve& long legged onsie and a receiving blanket for  to go home in.  Raoul also asked her bedside nurse for pants for herself as she had no pants to go home in, so a pair of scrubs were given by nurse.  This writer walked family to the front door and helped to load Fan's belongings into the vehicle. FOB placed infant car seat with infant in it into the car seat base. It was noted that FOB drove up in a newer shinny SUV with leather seats.      Patient is reported to have support at home and feels ready to discharge today with , \"Gifty Barry\". Outreach RN will continue to follow and assist if needed with discharge plan. No additional needs identified at this time.    Ian Monge RN  "

## 2022-08-12 NOTE — PLAN OF CARE
Discharge instructions and danger signs reviewed with Fan and her spouse. Education completed. AVS signed. Awaiting filled prescriptions to be delivered prior to discharge.     Problem: Plan of Care   Goal: Readiness for Transition of Care  Outcome: Met

## 2022-08-12 NOTE — PLAN OF CARE
Patient is managing pain with Oxycodone, Ibuprofen and Tylenol.   Postpartum assessment WNL.   Tdap up to date.   Still needs to complete mood assessment and birth certificate.    Problem: Bleeding (Postpartum Vaginal Delivery)  Goal: Hemostasis  Outcome: Ongoing, Progressing     Problem: Infection (Postpartum Vaginal Delivery)  Goal: Absence of Infection Signs/Symptoms  Outcome: Ongoing, Progressing     Problem: Pain (Postpartum Vaginal Delivery)  Goal: Acceptable Pain Control  Outcome: Ongoing, Progressing     Problem: Adjustment to Role Transition (Postpartum Vaginal Delivery)  Goal: Successful Maternal Role Transition  Outcome: Met  Intervention: Support Maternal Role Transition  Recent Flowsheet Documentation  Taken 8/11/2022 2330 by Constanza Marcano, RN  Parent/Child Attachment Promotion:    positive reinforcement provided    caring behavior modeled    strengths emphasized

## 2022-08-12 NOTE — DISCHARGE INSTRUCTIONS
You have a Home Care nurse visit scheduled for , 22.  The home care nurse will contact you to confirm the appointment time.  If you do not receive a call by Guy morning, please call [facility] at [phone]. Please do not schedule a clinic appointment for  on the same day as the home nurse visit.

## 2022-08-15 ENCOUNTER — PATIENT OUTREACH (OUTPATIENT)
Dept: NURSING | Facility: CLINIC | Age: 27
End: 2022-08-15

## 2022-08-15 ENCOUNTER — PATIENT OUTREACH (OUTPATIENT)
Dept: CARE COORDINATION | Facility: CLINIC | Age: 27
End: 2022-08-15

## 2022-08-15 NOTE — PROGRESS NOTES
Clinic Care Coordination Contact    Follow Up Progress Note      Assessment: CCRN spoke with patient today. Patient gave birth to her new daughter on 8/10/2022 and patient needs assist to add new born to her insurance. Patient requesting assist. FRW screening completed today.     MRN: [id number] - new born daughter - Gifty Hendrickslouiswinston  Financial Resource Worker Screening    [facility] Benefits  Is patient requesting help applying for [facility] benefits?: No    [place]:  [place]  Is this an insurance renewal?: No  Is this a new insurance application request?: Yes  Have you recently applied for insurance?: No  How many people in your household? : 5  Do you file taxes?: Yes  How many dependents do you claim?: 4  What is the monthly gross income for the household (wages, social security, workers comp, and pension)? : 3400    Any other information for the FRW?: Need assist to add new born under mom's insurance and to notify the [facility].    Care Coordination team will tell patient:   Thank you for answering all the questions, based on screening questions, our Financial Resource Worker will reach out to you with additional questions and next steps.         Goals:    Goals        General     1. Add new born to insurance (pt-stated)      Notes - Note edited  2022  1:15 PM by Christy Hernandez RN     Goal Statement: I want to add my baby to my MNSure case in the next 90 days so that they will have active health insurance.    Date Goal set: 2022  Barriers: Language; Difficulty navigating MNSure system  Strengths: Willing to accept support  Date to Achieve By: 10/19/22  Patient expressed understanding of goal: Yes    Action steps to achieve this goal:  1. I will complete the Add  Form with the help of [facility] and request it be faxed to [facility] at fax number [phone].  2. I will provide all required information so that my baby can be added to my MNSure Case.  3. I will  contact [facility] at [phone] with the support of [facility] if there is further clarification needed.  4. I will request support with contacting the [place] [facility] at [phone] to resolve medical claims.     IDALIA on 8/3/2022          2. Preventative care (pt-stated)      Notes - Note created  8/15/2022  1:03 PM by Christy Hernandez RN       Goal Statement: I want to attend an Annual Physical exam in the next 90 days so that I may address my current health maintenance care gaps with my PCP.     Date goal set: 8/15/2022  Barriers: English as 2nd language  Strengths: motivated to attend PCP appt  Date to Achieve By: 11/15/2022  Patient expressed understanding of goal: Yes    Action steps to achieve this goal:  1. I will answer my phone when I am contacted to schedule my Annual Physical.  2. I will attend my Annual Physical appointment at [facility] on TBD.  3. I will schedule a follow up appointment with my PCP if it is recommended to do so while I am at the clinic.  4. I will follow up with [facility] regarding this goal at each outreach until it is completed.                   Care Gaps:    Health Maintenance Due   Topic Date Due     PREVENTIVE CARE VISIT  Never done     ASTHMA ACTION PLAN  Never done     ADVANCE CARE PLANNING  Never done     PAP  01/03/2023       Postponed to with next CHW outreach        Outreach Frequency: 6 weeks    Plan:   1) FRW screening completed today by CCRN to assist patient adding new born to her insurance.   2) CHW to assist with preventative care appt and 6 weeks post partum appt.

## 2022-08-15 NOTE — PROGRESS NOTES
Clinic Care Coordination Contact  Program: New Born  County:[facility] Case #:  [facility] Worker:   Yon #:   Subscriber #:   Renewal:  Date Applied:     FRW Outreach:   8/15/2022:  FRW called patient and left a vm with call back information. FRW will make outreach next week      Health Insurance:      Referral/Screening:  FRW Screening    Row Name 08/15/22 8516         [facility] Benefits     Is patient requesting help applying for [facility] benefits? No                   [place]:     [place] No         Is this an insurance renewal? No         Is this a new insurance application request? Yes         Have you recently applied for insurance? No         How many people in your household?  5         Do you file taxes? Yes         How many dependents do you claim? 4         What is the monthly gross income for the household (wages, social security, workers comp, and pension)?  3400                   OTHER     Is this a annie care application? No         Any other information for the FRW? Need assist to add new born under mom's insurance and to notify the county.               Goals Addressed:

## 2022-08-22 ENCOUNTER — PATIENT OUTREACH (OUTPATIENT)
Dept: CARE COORDINATION | Facility: CLINIC | Age: 27
End: 2022-08-22

## 2022-08-22 NOTE — PROGRESS NOTES
Clinic Care Coordination - Chart Review Only    Situation/Background: Patient chart reviewed by care coordinator related to [employer] [facility] conversion.    Assessment: Patient continues to be followed by Clinic Care Coordination.    Plan: Patient's chart updated to align with [employer] [facility] program for ongoing patient management.

## 2022-08-23 ENCOUNTER — PATIENT OUTREACH (OUTPATIENT)
Dept: CARE COORDINATION | Facility: CLINIC | Age: 27
End: 2022-08-23

## 2022-08-23 NOTE — PROGRESS NOTES
Clinic Care Coordination Contact  Program: New Born  County:[facility] Case #:  [facility] Worker:   Yon #:   Subscriber #:   Renewal:  Date Applied:     FRW Outreach:   8/23/2022:  FRW called patient attempt x2 and left a vm with call back information. FRW will make outreach next week  8/15/2022:  FRW called patient and left a vm with call back information. FRW will make outreach next week      Health Insurance:      Referral/Screening:  FRW Screening    Row Name 08/15/22 6849         [facility] Benefits     Is patient requesting help applying for [facility] benefits? No                   [place]urance:     [place] No         Is this an insurance renewal? No         Is this a new insurance application request? Yes         Have you recently applied for insurance? No         How many people in your household?  5         Do you file taxes? Yes         How many dependents do you claim? 4         What is the monthly gross income for the household (wages, social security, workers comp, and pension)?  3400                   OTHER     Is this a annie care application? No         Any other information for the FRW? Need assist to add new born under mom's insurance and to notify the county.               Goals Addressed:

## 2022-08-24 ENCOUNTER — LAB (OUTPATIENT)
Dept: LAB | Facility: CLINIC | Age: 27
End: 2022-08-24
Payer: COMMERCIAL

## 2022-08-24 DIAGNOSIS — R30.0 DYSURIA: ICD-10-CM

## 2022-08-24 DIAGNOSIS — R30.0 DYSURIA: Primary | ICD-10-CM

## 2022-08-24 DIAGNOSIS — R10.84 ABDOMINAL PAIN, GENERALIZED: Primary | ICD-10-CM

## 2022-08-24 DIAGNOSIS — R10.84 ABDOMINAL PAIN, GENERALIZED: ICD-10-CM

## 2022-08-24 LAB
BACTERIA #/AREA URNS HPF: ABNORMAL /HPF
RBC #/AREA URNS AUTO: >100 /HPF
SQUAMOUS #/AREA URNS AUTO: ABNORMAL /LPF
TRANS CELLS #/AREA URNS HPF: ABNORMAL /HPF
WBC #/AREA URNS AUTO: ABNORMAL /HPF

## 2022-08-24 PROCEDURE — 81015 MICROSCOPIC EXAM OF URINE: CPT

## 2022-08-24 PROCEDURE — 87086 URINE CULTURE/COLONY COUNT: CPT

## 2022-08-26 ENCOUNTER — PATIENT OUTREACH (OUTPATIENT)
Dept: CARE COORDINATION | Facility: CLINIC | Age: 27
End: 2022-08-26

## 2022-08-26 NOTE — PROGRESS NOTES
Clinic Care Coordination Contact  Program: New Born  County:[facility] Case #:  [facility] Worker:   Yon #:   Subscriber #:   Renewal:  Date Applied:     FRW Outreach:   8/26/2022: FRW called patient and [facility] to add baby Asha Durbin 8/10/2022 to her insurance. FRW will follow up in 2-3 weeks  8/23/2022:  FRW called patient attempt x2 and left a vm with call back information. FRW will make outreach next week  8/15/2022:  FRW called patient and left a vm with call back information. FRW will make outreach next week      Health Insurance:      Referral/Screening:  FRW Screening    Row Name 08/15/22 1640         [facility] Benefits     Is patient requesting help applying for [facility] benefits? No                   Insurance:     [place] No         Is this an insurance renewal? No         Is this a new insurance application request? Yes         Have you recently applied for insurance? No         How many people in your household?  5         Do you file taxes? Yes         How many dependents do you claim? 4         What is the monthly gross income for the household (wages, social security, workers comp, and pension)?  3400                   OTHER     Is this a annie care application? No         Any other information for the FRW? Need assist to add new born under mom's insurance and to notify the county.               Goals Addressed:

## 2022-08-27 LAB — BACTERIA UR CULT: NO GROWTH

## 2022-09-16 ENCOUNTER — PATIENT OUTREACH (OUTPATIENT)
Dept: CARE COORDINATION | Facility: CLINIC | Age: 27
End: 2022-09-16

## 2022-09-16 NOTE — PROGRESS NOTES
Clinic Care Coordination Contact  Program: New Born  County:[facility] Case #:  [facility] Worker:   Anayeliure #:   Subscriber #:   Renewal:  Date Applied:     FRW Outreach:   2022: FRW called and left mom a message that baby has active health insurance and we added it to the chat and clams are being reprocessed.   2022: FRW called patient and [facility] mets to add baby Asha Durbin 8/10/2022 to her insurance. FRW will follow up in 2-3 weeks  2022:  FRW called patient attempt x2 and left a vm with call back information. FRW will make outreach next week  8/15/2022:  FRW called patient and left a vm with call back information. FRW will make outreach next week      Health Insurance:  Baby Insurance   This subscriber has eligibility for [place]: Medical [place].  Sanag Type 11: Automatic  eligibility  Eligibility Begin Date: 2022  Eligibility End Date: --/--/----  This subscriber is eligible for the following service types: Medical Care ,  Chiropractic ,  Dental Care ,  Hospital ,  Hospital - Inpatient ,  Hospital - Outpatient ,  Emergency Services ,  Pharmacy ,  Professional (Physician) Visit - Office ,  Vision (Optometry) ,  Mental Health ,  Urgent Care  Referral/Screening:  FRW Screening    Row Name 08/15/22 6739         [facility] Benefits     Is patient requesting help applying for [facility] benefits? No                   Insurance:     [place]? No         Is this an insurance renewal? No         Is this a new insurance application request? Yes         Have you recently applied for insurance? No         How many people in your household?  5         Do you file taxes? Yes         How many dependents do you claim? 4         What is the monthly gross income for the household (wages, social security, workers comp, and pension)?  3400                   OTHER     Is this a annie care application? No         Any other information for the FRW? Need assist to add new  born under mom's insurance and to notify the county.               Goals Addressed:

## 2022-09-18 ENCOUNTER — HEALTH MAINTENANCE LETTER (OUTPATIENT)
Age: 27
End: 2022-09-18

## 2022-09-19 ENCOUNTER — PATIENT OUTREACH (OUTPATIENT)
Dept: CARE COORDINATION | Facility: CLINIC | Age: 27
End: 2022-09-19

## 2022-09-19 NOTE — PROGRESS NOTES
Clinic Care Coordination Contact    Community Health Worker Follow Up    Care Gaps:   Health Maintenance Due   Topic Date Due     PREVENTIVE CARE VISIT  Never done     ASTHMA ACTION PLAN  Never done     ADVANCE CARE PLANNING  Never done     COVID-19 Vaccine (4 - Booster for Moderna series) 04/13/2022     INFLUENZA VACCINE (1) 09/01/2022     PAP  01/03/2023     Scheduled on 11/15/2022 for physical.      Care Plan:   Care Plan: General     Problem: HP GENERAL PROBLEM     Goal: 1. Health Insurance  Completed 9/19/2022    Start Date: 7/19/2022 Expected End Date: 10/19/2022    This Visit's Progress: 100%    Note:     Goal Statement: I want to add my baby to my MNSure case in the next 90 days so that they will have active health insurance.    Date Goal set: 7/19/2022  Barriers: Language; Difficulty navigating MNSure system  Strengths: Willing to accept support  Date to Achieve By: 10/19/22  Patient expressed understanding of goal: Yes    Action steps to achieve this goal:  1. I received my new born's health insurance [place] card in the mail.   2. I will bring it to the clinic at the next visit.     Goal completed: 9/19/2022            Goal: 1. Physical     Start Date: 8/15/2022 Expected End Date: 11/15/2022    This Visit's Progress: 20%    Note:     Goal Statement: I want to attend an Annual Physical exam in the next 90 days so that I may address my current health maintenance care gaps with my PCP.     Date goal set: 8/15/2022  Barriers: English as 2nd language  Strengths: motivated to attend PCP appt  Date to Achieve By: 11/15/2022  Patient expressed understanding of goal: Yes    Action steps to achieve this goal:  1. I will attend my physical appointment as scheduled on 11/15/2022.  2. I will remember my appointment for physical.                     Problem: HP GENERAL PROBLEM             Intervention and Education during outreach:  -Patient received new born's [place] health [place] card and will bring it to the clinic at the  next office visit.   -Patient has no barrier and no additional resources needed at this time.   -Patient to call CHW for additional coordination assistance when needed.       CHW Next Outreach: In one month.

## 2022-09-21 ENCOUNTER — PATIENT OUTREACH (OUTPATIENT)
Dept: CARE COORDINATION | Facility: CLINIC | Age: 27
End: 2022-09-21

## 2022-09-21 NOTE — LETTER
[facility] CARE COORDINATION    September 21, 2022    Fan Duran  [address] [place] [address]    Dear Fan,  Your Care Team congratulates you on your journey to maintain wellness. This document will help guide you on your journey to maintain a healthy lifestyle.  You can use this to help you overcome any barriers you may encounter.  If you should have any questions or concerns, you can contact the members of your Care Team or contact your Primary Care Clinic for assistance.     Health Maintenance  Health Maintenance Reviewed:      My Access Plan  Medical Emergency 911   Primary Clinic Line   -     24 Hour Appointment Line [phone] or  [phone] ([phone]) (toll-free)   24 Hour Nurse Line [phone] (toll-free)   Preferred Urgent Care     Preferred Hospital     Preferred Pharmacy [facility] - [place], [place] - [address]     Behavioral Health Crisis Line The National Suicide Prevention Lifeline at 1-917.431.5524 or 911     My Care Team Members  Patient Care Team       Relationship Specialty Notifications Start End    Gloria Berg MD PCP - General Family Practice  9/27/19     Phone: [phone] Fax: [phone]         [address] [place] [address]    Angela Ho, PharmD Pharmacist Pharmacist  11/12/19     Phone: [phone]          [facility] [address] [place] [address]    Sully Barney MD Assigned PCP   1/9/22     Phone: [phone] Fax: [phone]         [address] [place] [id number]               Goals        COMPLETED: Med refill (pt-stated)         Goal Statement: I would like CCRN to assist me with med refill the next 30 days.     Date goal set: 4/5/2022  Barriers: language barrier  Strengths: motivated to attend PCP appt  Date to Achieve By: 5/5/2022  Patient expressed understanding of goal: Yes    Action steps to achieve this goal:  1. I will answer my phone when I am contacted  by CCRN.  3. I will follow up with CCC regarding this goal at each outreach until it is completed.                      It has been your Clinic Care Team's pleasure to work with you on your goals.    Regards,  Your Clinic Care Team

## 2022-09-21 NOTE — PROGRESS NOTES
Clinic Care Coordination Contact    Follow Up Progress Note      Assessment: CCRN spoke with patient today. Per patient, she already received her daughter - MRN: [id number] insurance card in the mail. Daughter showed 0 balance now. No further assist needed from [facility]. Reminded patient of her upcoming appts on 10/5/2022. Agreed to be graduated from [facility]. Will reach out to [facility] with further needs.     Care Gaps:    Health Maintenance Due   Topic Date Due     PREVENTIVE CARE VISIT  Never done     ASTHMA ACTION PLAN  Never done     ADVANCE CARE PLANNING  Never done     COVID-19 Vaccine (4 - Booster for Moderna series) 04/13/2022     INFLUENZA VACCINE (1) 09/01/2022     PAP  01/03/2023       10/5/2022      Plan: Patient has been graduated from [facility].

## 2022-10-05 ENCOUNTER — PRENATAL OFFICE VISIT (OUTPATIENT)
Dept: FAMILY MEDICINE | Facility: CLINIC | Age: 27
End: 2022-10-05
Payer: COMMERCIAL

## 2022-10-05 VITALS
HEART RATE: 85 BPM | DIASTOLIC BLOOD PRESSURE: 64 MMHG | TEMPERATURE: 98.8 F | OXYGEN SATURATION: 98 % | HEIGHT: 59 IN | RESPIRATION RATE: 16 BRPM | BODY MASS INDEX: 35.13 KG/M2 | WEIGHT: 174.25 LBS | SYSTOLIC BLOOD PRESSURE: 106 MMHG

## 2022-10-05 DIAGNOSIS — Z86.32 HISTORY OF GESTATIONAL DIABETES: ICD-10-CM

## 2022-10-05 DIAGNOSIS — Z86.32 HISTORY OF DIET CONTROLLED GESTATIONAL DIABETES MELLITUS (GDM): ICD-10-CM

## 2022-10-05 DIAGNOSIS — Z23 HIGH PRIORITY FOR 2019-NCOV VACCINE: Primary | ICD-10-CM

## 2022-10-05 PROBLEM — O24.419 GESTATIONAL DIABETES: Status: RESOLVED | Noted: 2022-03-31 | Resolved: 2022-10-05

## 2022-10-05 PROBLEM — Z36.89 ENCOUNTER FOR TRIAGE IN PREGNANT PATIENT: Status: RESOLVED | Noted: 2022-08-09 | Resolved: 2022-10-05

## 2022-10-05 PROBLEM — O09.90 HIGH-RISK PREGNANCY, UNSPECIFIED TRIMESTER: Status: RESOLVED | Noted: 2022-01-06 | Resolved: 2022-10-05

## 2022-10-05 LAB — HBA1C MFR BLD: 6.3 % (ref 0–5.6)

## 2022-10-05 PROCEDURE — 99207 PR POST PARTUM EXAM: CPT | Performed by: FAMILY MEDICINE

## 2022-10-05 PROCEDURE — 90471 IMMUNIZATION ADMIN: CPT | Performed by: FAMILY MEDICINE

## 2022-10-05 PROCEDURE — 90686 IIV4 VACC NO PRSV 0.5 ML IM: CPT | Performed by: FAMILY MEDICINE

## 2022-10-05 PROCEDURE — 36415 COLL VENOUS BLD VENIPUNCTURE: CPT | Performed by: FAMILY MEDICINE

## 2022-10-05 PROCEDURE — 83036 HEMOGLOBIN GLYCOSYLATED A1C: CPT | Performed by: FAMILY MEDICINE

## 2022-10-05 NOTE — PROGRESS NOTES
Assessment/Plan:     Fan was seen today for postpartum care and imm/inj.    Diagnoses and all orders for this visit:    High priority for 2019-nCoV vaccine: No moderna booster available today- reviewed options and gave Pfizer bivalent booster.   -     COVID-19,PF,PFIZER BOOSTER BIVALENT    History of gestational diabetes: Declines 2 hour GTT- A1C completed today- in prediabetes level. Recheck at her next physical in January with PCP.  -     Hemoglobin A1c; Future  -     Hemoglobin A1c    Other orders  -     INFLUENZA VACCINE IM >6 MO VALENT IIV4 (AFLURIA/FLUZONE)        Anemia:  Normal antepartum hemoglobin with no excessive blood loss  Breastfeeding/Bottle feeding:  Pt initially  the baby, but is now bottle feeding.   Contraception:   Depo-Provera given in hospital 22- will schedule repeat in 3 months.  Depression:   See [facility] survey  Exercise:   Encouraged increasing exercise based on how she is feeling.  Healthcare maintenance:   Not due for pap smear until January  Immunizations:    Immunizations needed; see orders      Follow-up in January for annual preventive physical with PCP, Dr. Berg.         Subjective:      Fan Duran is a 27 year old female who presents for a postpartum visit.   She is postpartum following a vaginal delivery  I have fully reviewed the prenatal and intrapartum course.   Delivery notes below  Postpartum course has been Unremarkable.  Baby's course has been Uncomplicated.  Periods:  Have resumed normal Patient's last menstrual period was 2022 (exact date).   Bowel or bladder concerns: denies  Patient has not resumed intercourse.    [place]  Depression Scale: see flowsheet    Last hgb on file:    Lab Results   Component Value Date    HGB 11.3 (L) 2022        The following portions of the patient's history were reviewed and updated as appropriate: allergies, current medications and problem list     OB History     "Para Term  AB Living   4 3 3 0 1 3   SAB IAB Ectopic Multiple Live Births   0 0 0 0 3      # Outcome Date GA Lbr Mauricio/2nd Weight Sex Delivery Anes PTL Lv   4 Term 08/10/22 40w4d 02:04 :28 3.35 kg (7 lb 6.2 oz) F Vag-Spont None N FATMATA      Name: KELLI,FEMALE-LUIS FELIPE      Apgar1: 8  Apgar5: 9   3 AB 19           2 Term 17   3.175 kg (7 lb) M Vag-Spont   FATMATA      Birth Comments: Born in [facility]   1 Term 12   2.948 kg (6 lb 8 oz) F Vag-Vacuum   FATMATA      Birth Comments: [facility]. Vacuum assisted delivery for failure to progress in 2nd stage     Information for the patient's :  Gifty Barry [[id number]]   Gifty Barry       Objective:      /64   Pulse 85   Temp 98.8  F (37.1  C) (Oral)   Resp 16   Ht 1.499 m (4' 11\")   Wt 79 kg (174 lb 4 oz)   LMP 2022 (Exact Date)   SpO2 98%   Breastfeeding No   BMI 35.19 kg/m      Physical Exam:  General Appearance: Alert, cooperative, no distress, appears stated age  Lungs: Clear to auscultation bilaterally, respirations unlabored  Heart: Regular rate and rhythm, S1 and S2 normal, no murmur, rub, or gallop  Abdomen: Soft, non-tender    Sully Barney MD  "

## 2022-10-06 ENCOUNTER — TELEPHONE (OUTPATIENT)
Dept: FAMILY MEDICINE | Facility: CLINIC | Age: 27
End: 2022-10-06

## 2022-10-06 NOTE — TELEPHONE ENCOUNTER
----- Message from Sully Barney MD sent at 10/5/2022  3:54 PM CDT -----  Patient had postpartum visit today and I forgot to schedule her next depo shot. She had first one 8/12/22- please call and schedule her for her next one within the recommended time frame

## 2022-10-06 NOTE — TELEPHONE ENCOUNTER
Reached out to patient to assist with an appointment for future Depo shot.  An appt made for 2nd week of November. Date, time and location confirmed with patient.     BESSY LazaroN, RN  [facility]

## 2022-11-11 DIAGNOSIS — Z30.013 EVALUATION FOR CONTRACEPTIVE INJECTION: Primary | ICD-10-CM

## 2022-11-11 RX ORDER — MEDROXYPROGESTERONE ACETATE 150 MG/ML
150 INJECTION, SUSPENSION INTRAMUSCULAR
Status: DISCONTINUED | OUTPATIENT
Start: 2022-11-11 | End: 2023-11-06

## 2022-11-11 NOTE — TELEPHONE ENCOUNTER
Patient was NS for today's Depo injection.   Calling to schedule new appointment.  She will try to come before 5 PM today.

## 2023-01-18 ENCOUNTER — NURSE TRIAGE (OUTPATIENT)
Dept: NURSING | Facility: CLINIC | Age: 28
End: 2023-01-18
Payer: COMMERCIAL

## 2023-01-18 ENCOUNTER — MEDICAL CORRESPONDENCE (OUTPATIENT)
Dept: HEALTH INFORMATION MANAGEMENT | Facility: CLINIC | Age: 28
End: 2023-01-18

## 2023-01-19 ENCOUNTER — TELEPHONE (OUTPATIENT)
Dept: FAMILY MEDICINE | Facility: CLINIC | Age: 28
End: 2023-01-19
Payer: COMMERCIAL

## 2023-01-19 NOTE — TELEPHONE ENCOUNTER
I do not see in the chart that this patient went to the ER. She might have gone to a hospital that does not show up on the chart. Please call her and find out how she is doing, and if she went to the ER?        Placed a call to patient in attempt to assess status update per provider recommendation from above. However, no answer.   left with clinic call back number provided.      NANCY Lazaro, RN  [facility]

## 2023-01-19 NOTE — TELEPHONE ENCOUNTER
I do not see in the chart that this patient went to the ER. She might have gone to a hospital that does not show up on the chart. Please call her and find out how she is doing, and if she went to the ER?

## 2023-01-19 NOTE — TELEPHONE ENCOUNTER
"\"I have vaginal bleeding that it is not normal. I had my baby 5 mo ago. Blood is red with large clots. I feel hot when the clots come out. Back and abdominal pain.  Period began 1/2/2023--bleeding small amounts since then, today it came in a much larger amount.    She was dizzy earlier tonight. A little bit dizzy now.   Abdominal pain =\"5\", Back pain=\"6\". She has not taken anything for the pain yet. She has Tylenol.    Bleeding amount= using pads. Changing saturated pad every 5 minutes.Bleeding increased in amount @ 6pm.     Triaged to a disposition of Go to ED now, which she intends to do (CHERI).    Mary Gerard RN Triage Nurse Advisor 10:30 PM 1/18/2023  Reason for Disposition    SEVERE vaginal bleeding (e.g., soaking 2 pads or tampons per hour and present 2 or more hours; 1 menstrual cup every 2 hours)    Additional Information    Negative: Shock suspected (e.g., cold/pale/clammy skin, too weak to stand, low BP, rapid pulse)    Negative: Difficult to awaken or acting confused (e.g., disoriented, slurred speech)    Negative: Passed out (i.e., lost consciousness, collapsed and was not responding)    Negative: Sounds like a life-threatening emergency to the triager    Negative: Followed a genital area injury (e.g., vagina, vulva)    Negative: Pregnant 20 or more weeks    Negative: Pregnant < 20 weeks  (less than 5 months)    Negative: Postpartum (from 0 to 6 weeks after delivery)    Negative: Bleeding occurring > 12 months after menopause    Negative: Bleeding from sexual abuse or rape    Negative: [1] Vaginal discharge is main symptom AND [2] small amount of blood    Negative: SEVERE abdominal pain    Negative: SEVERE dizziness (e.g., unable to stand, requires support to walk, feels like passing out now)    Protocols used: VAGINAL BLEEDING - [id number]      "

## 2023-01-20 NOTE — TELEPHONE ENCOUNTER
Called pt in an attempt to follow-up with pt x 2. Could not reach pt. Left VM to call back.    Sim Paz Cem Say, BSN RN  [facility]

## 2023-01-20 NOTE — TELEPHONE ENCOUNTER
Pt called back stating that she did not go to ED.    - abdominal pain that feels like cramps  - a little dizziness  - back pain as well. Pain comes and goes  - pain is at a 4/10  - no fever, no SOB, no chest pain  - pt states that her family thinks she had a miscarriage. Pt stated she has a 5 month old baby and is not sure if this was a miscarriage as she was unaware of being pregnant.  - pt states she is feeling better than when she called last time.  - appt offered today 1/20/23. Pt refused and stated she cannot make it today. Scheduled apt with Dr. Hoover on 1/30/23.    - writer recommended pt goes to ED if symptoms worsen or if she is having new symptoms. Pt verbalized understanding.    Sim Herring, BSN RN  [facility]

## 2023-01-22 ENCOUNTER — HOSPITAL ENCOUNTER (EMERGENCY)
Facility: HOSPITAL | Age: 28
Discharge: HOME | End: 2023-01-22
Attending: EMERGENCY MEDICINE | Admitting: EMERGENCY MEDICINE
Payer: COMMERCIAL

## 2023-01-22 ENCOUNTER — APPOINTMENT (OUTPATIENT)
Dept: ULTRASOUND IMAGING | Facility: HOSPITAL | Age: 28
End: 2023-01-22
Attending: EMERGENCY MEDICINE
Payer: COMMERCIAL

## 2023-01-22 VITALS
OXYGEN SATURATION: 99 % | WEIGHT: 181 LBS | TEMPERATURE: 98.4 F | DIASTOLIC BLOOD PRESSURE: 96 MMHG | BODY MASS INDEX: 35.53 KG/M2 | HEART RATE: 85 BPM | RESPIRATION RATE: 18 BRPM | SYSTOLIC BLOOD PRESSURE: 131 MMHG | HEIGHT: 60 IN

## 2023-01-22 DIAGNOSIS — N93.9 VAGINAL BLEEDING: ICD-10-CM

## 2023-01-22 LAB
ABO/RH(D): NORMAL
ALBUMIN SERPL BCG-MCNC: 4.9 G/DL (ref 3.5–5.2)
ALBUMIN UR-MCNC: 20 MG/DL
ALP SERPL-CCNC: 60 U/L (ref 35–104)
ALT SERPL W P-5'-P-CCNC: 51 U/L (ref 10–35)
ANION GAP SERPL CALCULATED.3IONS-SCNC: 11 MMOL/L (ref 7–15)
ANTIBODY SCREEN: NEGATIVE
APPEARANCE UR: ABNORMAL
AST SERPL W P-5'-P-CCNC: 37 U/L (ref 10–35)
BASOPHILS # BLD AUTO: 0.1 10E3/UL (ref 0–0.2)
BASOPHILS NFR BLD AUTO: 1 %
BILIRUB SERPL-MCNC: 0.2 MG/DL
BILIRUB UR QL STRIP: NEGATIVE
BUN SERPL-MCNC: 13.5 MG/DL (ref 6–20)
CALCIUM SERPL-MCNC: 9.9 MG/DL (ref 8.6–10)
CHLORIDE SERPL-SCNC: 102 MMOL/L (ref 98–107)
COLOR UR AUTO: YELLOW
CREAT SERPL-MCNC: 0.8 MG/DL (ref 0.51–0.95)
DEPRECATED HCO3 PLAS-SCNC: 26 MMOL/L (ref 22–29)
EOSINOPHIL # BLD AUTO: 0.4 10E3/UL (ref 0–0.7)
EOSINOPHIL NFR BLD AUTO: 5 %
ERYTHROCYTE [DISTWIDTH] IN BLOOD BY AUTOMATED COUNT: 12.7 % (ref 10–15)
GFR SERPL CREATININE-BSD FRML MDRD: >90 ML/MIN/1.73M2
GLUCOSE SERPL-MCNC: 146 MG/DL (ref 70–99)
GLUCOSE UR STRIP-MCNC: NEGATIVE MG/DL
HCG INTACT+B SERPL-ACNC: 684 MIU/ML
HCT VFR BLD AUTO: 39.7 % (ref 35–47)
HGB BLD-MCNC: 13 G/DL (ref 11.7–15.7)
HGB UR QL STRIP: ABNORMAL
HOLD SPECIMEN: NORMAL
HYALINE CASTS: 4 /LPF
IMM GRANULOCYTES # BLD: 0 10E3/UL
IMM GRANULOCYTES NFR BLD: 0 %
KETONES UR STRIP-MCNC: NEGATIVE MG/DL
LEUKOCYTE ESTERASE UR QL STRIP: ABNORMAL
LIPASE SERPL-CCNC: 68 U/L (ref 13–60)
LYMPHOCYTES # BLD AUTO: 2.3 10E3/UL (ref 0.8–5.3)
LYMPHOCYTES NFR BLD AUTO: 23 %
MCH RBC QN AUTO: 27.9 PG (ref 26.5–33)
MCHC RBC AUTO-ENTMCNC: 32.7 G/DL (ref 31.5–36.5)
MCV RBC AUTO: 85 FL (ref 78–100)
MONOCYTES # BLD AUTO: 0.5 10E3/UL (ref 0–1.3)
MONOCYTES NFR BLD AUTO: 5 %
MUCOUS THREADS #/AREA URNS LPF: PRESENT /LPF
NEUTROPHILS # BLD AUTO: 6.5 10E3/UL (ref 1.6–8.3)
NEUTROPHILS NFR BLD AUTO: 66 %
NITRATE UR QL: NEGATIVE
NRBC # BLD AUTO: 0 10E3/UL
NRBC BLD AUTO-RTO: 0 /100
PH UR STRIP: 6 [PH] (ref 5–7)
PLATELET # BLD AUTO: 313 10E3/UL (ref 150–450)
POTASSIUM SERPL-SCNC: 3.6 MMOL/L (ref 3.4–5.3)
PROT SERPL-MCNC: 8.6 G/DL (ref 6.4–8.3)
RBC # BLD AUTO: 4.66 10E6/UL (ref 3.8–5.2)
RBC URINE: >182 /HPF
SODIUM SERPL-SCNC: 139 MMOL/L (ref 136–145)
SP GR UR STRIP: 1.02 (ref 1–1.03)
SPECIMEN EXPIRATION DATE: NORMAL
SQUAMOUS EPITHELIAL: 2 /HPF
UROBILINOGEN UR STRIP-MCNC: <2 MG/DL
WBC # BLD AUTO: 9.8 10E3/UL (ref 4–11)
WBC URINE: 0 /HPF

## 2023-01-22 PROCEDURE — 81003 URINALYSIS AUTO W/O SCOPE: CPT | Performed by: EMERGENCY MEDICINE

## 2023-01-22 PROCEDURE — 76856 US EXAM PELVIC COMPLETE: CPT

## 2023-01-22 PROCEDURE — 86901 BLOOD TYPING SEROLOGIC RH(D): CPT | Performed by: EMERGENCY MEDICINE

## 2023-01-22 PROCEDURE — 83690 ASSAY OF LIPASE: CPT | Performed by: EMERGENCY MEDICINE

## 2023-01-22 PROCEDURE — 84702 CHORIONIC GONADOTROPIN TEST: CPT | Performed by: EMERGENCY MEDICINE

## 2023-01-22 PROCEDURE — 250N000013 HC RX MED GY IP 250 OP 250 PS 637: Performed by: EMERGENCY MEDICINE

## 2023-01-22 PROCEDURE — 85025 COMPLETE CBC W/AUTO DIFF WBC: CPT | Performed by: EMERGENCY MEDICINE

## 2023-01-22 PROCEDURE — 80053 COMPREHEN METABOLIC PANEL: CPT | Performed by: EMERGENCY MEDICINE

## 2023-01-22 PROCEDURE — 36415 COLL VENOUS BLD VENIPUNCTURE: CPT | Performed by: STUDENT IN AN ORGANIZED HEALTH CARE EDUCATION/TRAINING PROGRAM

## 2023-01-22 PROCEDURE — 85025 COMPLETE CBC W/AUTO DIFF WBC: CPT | Performed by: STUDENT IN AN ORGANIZED HEALTH CARE EDUCATION/TRAINING PROGRAM

## 2023-01-22 PROCEDURE — 99284 EMERGENCY DEPT VISIT MOD MDM: CPT | Mod: 25

## 2023-01-22 PROCEDURE — 87086 URINE CULTURE/COLONY COUNT: CPT | Performed by: EMERGENCY MEDICINE

## 2023-01-22 RX ORDER — ACETAMINOPHEN 325 MG/1
975 TABLET ORAL ONCE
Status: COMPLETED | OUTPATIENT
Start: 2023-01-22 | End: 2023-01-22

## 2023-01-22 RX ADMIN — ACETAMINOPHEN 975 MG: 325 TABLET, FILM COATED ORAL at 19:00

## 2023-01-22 ASSESSMENT — ACTIVITIES OF DAILY LIVING (ADL)
ADLS_ACUITY_SCORE: 35
ADLS_ACUITY_SCORE: 35

## 2023-01-22 NOTE — ED TRIAGE NOTES
"     Triage Assessment     Row Name 01/22/23 3814       Triage Assessment (Adult)    Airway WDL WDL       Respiratory WDL    Respiratory WDL WDL       Skin Circulation/Temperature WDL    Skin Circulation/Temperature WDL WDL       Cardiac WDL    Cardiac WDL WDL       Peripheral/Neurovascular WDL    Peripheral Neurovascular WDL WDL       Cognitive/Neuro/Behavioral WDL    Cognitive/Neuro/Behavioral WDL WDL            Patient reports that she had a baby 5 months ago and has had \"little bit of vaginal bleeding every day since then.\"  Pt states increased bleeding 5 days ago, today changed pad every 2 hours.  Pt also c/o \"dizzy and sleepy.\"      "

## 2023-01-23 NOTE — ED PROVIDER NOTES
EMERGENCY DEPARTMENT ENCOUNTER      NAME: Fan Duran  AGE: 28 year old female  YOB: 1995  MRN: [id number]  EVALUATION DATE & TIME: 1/22/2023  6:00 PM    PCP: Gloria Berg    ED PROVIDER: Marcelino Cervantes M.D.      Chief Complaint   Patient presents with     Abdominal Pain     Vaginal Bleeding         FINAL IMPRESSION:  1. Vaginal bleeding          ED COURSE & MEDICAL DECISION MAKING:    Pertinent Labs & Imaging studies reviewed. (See chart for details)  28 year old female presents to the Emergency Department for evaluation of abdominal pain, vaginal bleeding. Patient appears non toxic with stable vitals signs, patient is afebrile with no tachycardia or hypoxia, no increased work of breathing.  Lungs are clear, I cannot reproduce any significant tenderness with deep palpation my abdominal exam, no flank pain or CVA tenderness, no midline back pain.  Does endorse some vaginal bleeding, patient states that she has been off of birth control for 2 months.  No reports of discharge, fever or body aches.  Considered ectopic, miscarriage, tubo-ovarian abscess, ovarian cyst or torsion, less likely STI or PID.  No focal tenderness on exam to suggest appendicitis, diverticulitis, cholecystitis or pancreatitis, nothing to suggest perforation, obstruction, GI bleed.  No flank pain to suggest nephrolithiasis pyelonephritis, considered but low suspicion for cystitis.  No reports of any chest or pulmonary complaints with nothing to suggest atypical ACS, PE, dissection, ruptured AAA or esophageal rupture.  We will obtain screening labs, urine studies and ultrasound imaging.  Patient was given Tylenol.    Reassessment: Labs here showed no acute concerning findings, did note elevated quantitative hCG, concerning for pregnancy or with her symptoms possible miscarriage.  Considered but less likely ectopic.  That said, ultrasound imaging reported no acute concerning findings with certainly nothing to suggest  ectopic.  Possible patient with early pregnancy and miscarriage given her reports of passing blood clots.  Urine studies here showed leukocytes and blood but no nitrates, no reports of bacteria, she denies any urinary symptoms we will send for culture and treat only if positive.  Repeat exam benign patient states abdominal pain resolved following Tylenol.  With improvement in symptoms, negative work-up and otherwise reassuring vitals feel she is safe for discharge and close follow-up.  I instructed the patient to follow-up with her primary care provider in the next 2 days for repeat evaluation and repeat serial hCG testing.  I discussed all these findings and need for continued outpatient evaluation with the patient who agreed with the care plan.  All of her questions were answered and reasons to return discussed.  Patient felt comfort this plan was discharged in stable condition.        6:18 PM I met with the patient for the initial interview and physical examination. Discussed plan for treatment and workup in the ED. PPE: Provider wore gloves, N95 mask, and eye protection.    10:10 PM Repeat exam is benign. Discussed findings and discharge with close follow up.     Medical Decision Making    History:    Supplemental history from: Documented in chart, if applicable    External Record(s) reviewed: Documented in chart, if applicable. and Inpatient Record: Hospital Stay 8/9/22    Work Up:    Chart documentation includes differential considered and any EKGs or imaging independently interpreted by provider, where specified.    In additional to work up documented, I considered the following work up: Documented in chart, if applicable.    External consultation:    Discussion of management with another provider: Documented in chart, if applicable    Complicating factors:    Care impacted by chronic illness: N/A    Care affected by social determinants of health: Access to Medical Care    Disposition considerations:  "Discharge. No recommendations on prescription strength medication(s). Admission consideration documented above, if applicable.          At the conclusion of the encounter I discussed the results of all of the tests and the disposition. The questions were answered and return precautions provided. The patient or family acknowledged understanding and was agreeable with the care plan.         MEDICATIONS GIVEN IN THE EMERGENCY:  Medications   acetaminophen (TYLENOL) tablet 975 mg (975 mg Oral Given 1/22/23 1900)       NEW PRESCRIPTIONS STARTED AT TODAY'S ER VISIT  There are no discharge medications for this patient.           =================================================================    HPI    Patient information was obtained from: the patient     Use of Intrepreter: N/A        Fan Duran is a 28 year old female with a history of asthma who presents to the ED via walk in for evaluation of abdominal pain and vaginal bleeding.     The patient reports the onset of lower abdominal pain, which radiates into her lower back, and vaginal bleeding five days ago (1/18). Her abdominal pain worsened today, prompting presentation to the ED. She describes this pain as being \"like contractions,\" and she denies taking any medications for this pain at home. She also endorses lightheadedness. The patient notes that she is five months post partum. She had a vaginal delivery with no complications, and her pregnancy was also without complications. She is not on birth control at this time. The patient denies fever, vomiting, change in bowel or bladder habits, syncope, and any other symptoms or complaints at this time.     Per Chart Review:   8/9/22-8/12/22 The patient was admitted to [facility] for a normal spontaeous vaginal delivery with no complications at 40 weeks 4 days gestation. The patient had gestational diabetes with this pregnany, but it was well controlled. She was discharged home.     REVIEW OF SYSTEMS "   Constitutional:  Denies fever, chills  Respiratory:  Denies productive cough or increased work of breathing  Cardiovascular:  Denies chest pain, palpitations  GI:  Denies nausea, vomiting, or change in bowel or bladder habits. Endorses lower abdominal pain   : Endorses vaginal bleeding  Musculoskeletal:  Denies any new muscle/joint swelling. Endorses low back pain  Skin:  Denies rash   Neurologic:  Denies focal weakness, syncope. Endorses lightheadedness   All systems negative except as marked.     PAST MEDICAL HISTORY:  Past Medical History:   Diagnosis Date     Asthma 2019     H/O: iron deficiency anemia     as a young child     Miscarriage 10/1/2019     Overweight        PAST SURGICAL HISTORY:  Past Surgical History:   Procedure Laterality Date     NO PAST SURGERIES           CURRENT MEDICATIONS:    Prior to Admission medications    Not on File        ALLERGIES:  No Known Allergies    FAMILY HISTORY:  Family History   Problem Relation Age of Onset     Depression Mother      Cancer Father         esophageal cancer - getting treatment; he is a smoker     Hepatitis Father         C     Mental Illness Daughter      No Known Problems Son      No Known Problems Other        SOCIAL HISTORY:   Social History     Socioeconomic History     Marital status:    Tobacco Use     Smoking status: Never     Smokeless tobacco: Never     Tobacco comments:     passive smoker - parents smoked   Substance and Sexual Activity     Alcohol use: Never     Drug use: Never     Sexual activity: Yes     Partners: Male     Birth control/protection: Injection     Comment: SP = 1   Social History Narrative    As of 9/2022        Arrival in [facility]: [address] [place], [place]  Moved to [place] 8/26/2019            Marrital status:  to Sim Jarrett; not abusive but he's forgetful/ignorant; he drinks sometimes with friends but is not abusive then either        [facility]: [facility] and and 2 kids (one son -Chetan Jarrett, daughter Julia (Rozina)  [place], and as of 8/2022 a new baby girl)              Languages spoken: Penelope, some English          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, 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       works with [employer] (as of 11/2019) - 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              Place of [place]: [place]  Years in a refugee camp: 15            Family not in [place]: Paternal grandparents - in [facility]      Family in [place]: Maternal grandpa, maternal aunt, parents            Education previous to [place] to the [place]: to grade 10      Education started in the [place] [employer]              Protestant: Latter day        Is a [place] citizen         VITALS:  Patient Vitals for the past 24 hrs:   BP Temp Temp src Pulse Resp SpO2 Height Weight   01/22/23 2218 (!) 131/96 -- -- 85 -- 99 % -- --   01/22/23 2055 -- -- -- 94 -- 99 % -- --   01/22/23 2054 -- -- -- 95 -- 100 % -- --   01/22/23 2053 -- -- -- 87 -- 100 % -- --   01/22/23 2052 -- -- -- 88 -- 99 % -- --   01/22/23 1921 116/74 -- -- 88 -- 100 % -- --   01/22/23 1900 120/69 -- -- 83 -- 100 % -- --   01/22/23 1733 123/70 98.4  F (36.9  C) Temporal 98 18 100 % 1.524 m (5') 82.1 kg (181 lb)        PHYSICAL EXAM    Constitutional:  Awake, alert, in no apparent distress  HENT:  Normocephalic, Atraumatic. Bilateral external ears normal. Oropharynx moist. Nose normal. Neck- Normal range of motion with no guarding, No midline cervical tenderness, Supple, No stridor.   Eyes:  PERRL, EOMI with no signs of entrapment, Conjunctiva normal, No discharge.   Respiratory:  Normal breath sounds, No respiratory distress, No wheezing.    Cardiovascular:  Normal heart rate, Normal rhythm, No appreciable rubs or gallops.   GI:  Soft, No tenderness, No distension, No palpable masses  : No CVA tenderness.  Musculoskeletal:  Intact distal pulses, No edema. Good range of motion in all major joints. No tenderness to palpation or major deformities noted.  Back-nontender along midline cervical,  thoracic and lumbar spine with no step-offs or signs of trauma.  Integument:  Warm, Dry, No erythema, No rash.   Neurologic:  Alert & oriented, Normal motor function, Normal sensory function, No focal deficits noted.   Psychiatric:  Affect normal, Judgment normal, Mood normal.     LAB:  All pertinent labs reviewed and interpreted.  Results for orders placed or performed during the hospital encounter of 01/22/23   US Pelvis Cmplt w Transvag & Doppler LmtPel Duplex Limited    Impression    IMPRESSION:    1.  Negative pelvic ultrasound.         CBC with platelets and differential   Result Value Ref Range    WBC Count 9.8 4.0 - 11.0 10e3/uL    RBC Count 4.66 3.80 - 5.20 10e6/uL    Hemoglobin 13.0 11.7 - 15.7 g/dL    Hematocrit 39.7 35.0 - 47.0 %    MCV 85 78 - 100 fL    MCH 27.9 26.5 - 33.0 pg    MCHC 32.7 31.5 - 36.5 g/dL    RDW 12.7 10.0 - 15.0 %    Platelet Count 313 150 - 450 10e3/uL    % Neutrophils 66 %    % Lymphocytes 23 %    % Monocytes 5 %    % Eosinophils 5 %    % Basophils 1 %    % Immature Granulocytes 0 %    NRBCs per 100 WBC 0 <1 /100    Absolute Neutrophils 6.5 1.6 - 8.3 10e3/uL    Absolute Lymphocytes 2.3 0.8 - 5.3 10e3/uL    Absolute Monocytes 0.5 0.0 - 1.3 10e3/uL    Absolute Eosinophils 0.4 0.0 - 0.7 10e3/uL    Absolute Basophils 0.1 0.0 - 0.2 10e3/uL    Absolute Immature Granulocytes 0.0 <=0.4 10e3/uL    Absolute NRBCs 0.0 10e3/uL   Extra Blue Top Tube   Result Value Ref Range    Hold Specimen JIC    Extra Red Top Tube   Result Value Ref Range    Hold Specimen JIC    Extra Green Top (Lithium Heparin) Tube   Result Value Ref Range    Hold Specimen JIC    Comprehensive metabolic panel   Result Value Ref Range    Sodium 139 136 - 145 mmol/L    Potassium 3.6 3.4 - 5.3 mmol/L    Chloride 102 98 - 107 mmol/L    Carbon Dioxide (CO2) 26 22 - 29 mmol/L    Anion Gap 11 7 - 15 mmol/L    Urea Nitrogen 13.5 6.0 - 20.0 mg/dL    Creatinine 0.80 0.51 - 0.95 mg/dL    Calcium 9.9 8.6 - 10.0 mg/dL    Glucose 146 (H)  70 - 99 mg/dL    Alkaline Phosphatase 60 35 - 104 U/L    AST 37 (H) 10 - 35 U/L    ALT 51 (H) 10 - 35 U/L    Protein Total 8.6 (H) 6.4 - 8.3 g/dL    Albumin 4.9 3.5 - 5.2 g/dL    Bilirubin Total 0.2 <=1.2 mg/dL    GFR Estimate >90 >60 mL/min/1.73m2   Result Value Ref Range    Lipase 68 (H) 13 - 60 U/L   HCG quantitative pregnancy   Result Value Ref Range    hCG Quantitative 684 (H) <5 mIU/mL   UA with Microscopic reflex to Culture    Specimen: Urine, Midstream   Result Value Ref Range    Color Urine Yellow Colorless, Straw, Light Yellow, Yellow    Appearance Urine Turbid (A) Clear    Glucose Urine Negative Negative mg/dL    Bilirubin Urine Negative Negative    Ketones Urine Negative Negative mg/dL    Specific Gravity Urine 1.025 1.001 - 1.030    Blood Urine >1.0 mg/dL (A) Negative    pH Urine 6.0 5.0 - 7.0    Protein Albumin Urine 20 (A) Negative mg/dL    Urobilinogen Urine <2.0 <2.0 mg/dL    Nitrite Urine Negative Negative    Leukocyte Esterase Urine 250 Derrick/uL (A) Negative    Mucus Urine Present (A) None Seen /LPF    RBC Urine >182 (H) <=2 /HPF    WBC Urine 0 <=5 /HPF    Squamous Epithelials Urine 2 (H) <=1 /HPF    Hyaline Casts Urine 4 (H) <=2 /LPF   Adult Type and Screen   Result Value Ref Range    ABO/RH(D) B POS     Antibody Screen Negative Negative    SPECIMEN EXPIRATION DATE [id number]        RADIOLOGY:  US Pelvis Cmplt w Transvag & Doppler LmtPel Duplex Limited   Final Result   IMPRESSION:     1.  Negative pelvic ultrasound.                      EKG:    None.     PROCEDURES:   None.          I, Aleisha Wili, am serving as a scribe to document services personally performed by Marcelino Cervantes MD, based on my observation and the provider's statements to me. I, Marcelino Cervantes MD attest that Aleisha Rasheed is acting in a scribe capacity, has observed my performance of the services and has documented them in accordance with my direction.    Marcelino Cervantes M.D.  Emergency Medicine  [facility]  [facility] EMERGENCY DEPARTMENT  [address] [place] [id number]  [phone]  Dept: [phone]      Marcelino Cervantes MD  01/22/23 7172

## 2023-01-23 NOTE — ED NOTES
PT showed this RN pictures of her significant vaginal bleeding including fairly large clots close to the size of the pt's hand these were all passed on weds, today she endorses the bleeding has slowed down but the pain has become worse today. MD is aware.

## 2023-01-24 ENCOUNTER — TELEPHONE (OUTPATIENT)
Dept: LAB | Facility: CLINIC | Age: 28
End: 2023-01-24
Payer: COMMERCIAL

## 2023-01-24 DIAGNOSIS — N93.9 VAGINAL BLEEDING: Primary | ICD-10-CM

## 2023-01-24 DIAGNOSIS — Z86.32 HISTORY OF GESTATIONAL DIABETES: ICD-10-CM

## 2023-01-24 DIAGNOSIS — Z13.220 SCREENING FOR HYPERLIPIDEMIA: ICD-10-CM

## 2023-01-24 LAB — BACTERIA UR CULT: NO GROWTH

## 2023-01-24 NOTE — PROGRESS NOTES
Patient has a lab only apt on 1/25/23, but there are no orders in chart. Please add orders before apt. Thank you.

## 2023-01-30 ENCOUNTER — OFFICE VISIT (OUTPATIENT)
Dept: FAMILY MEDICINE | Facility: CLINIC | Age: 28
End: 2023-01-30
Payer: COMMERCIAL

## 2023-01-30 VITALS
BODY MASS INDEX: 34.46 KG/M2 | HEIGHT: 60 IN | SYSTOLIC BLOOD PRESSURE: 110 MMHG | TEMPERATURE: 97.9 F | RESPIRATION RATE: 16 BRPM | DIASTOLIC BLOOD PRESSURE: 70 MMHG | WEIGHT: 175.5 LBS | HEART RATE: 89 BPM | OXYGEN SATURATION: 99 %

## 2023-01-30 DIAGNOSIS — O20.0 THREATENED ABORTION: Primary | ICD-10-CM

## 2023-01-30 DIAGNOSIS — Z13.220 SCREENING FOR HYPERLIPIDEMIA: ICD-10-CM

## 2023-01-30 DIAGNOSIS — O03.9 MISCARRIAGE: ICD-10-CM

## 2023-01-30 LAB
CHOLEST SERPL-MCNC: 207 MG/DL
ERYTHROCYTE [DISTWIDTH] IN BLOOD BY AUTOMATED COUNT: 12.4 % (ref 10–15)
HCG INTACT+B SERPL-ACNC: 35 MIU/ML
HCT VFR BLD AUTO: 38.3 % (ref 35–47)
HDLC SERPL-MCNC: 52 MG/DL
HGB BLD-MCNC: 12.3 G/DL (ref 11.7–15.7)
LDLC SERPL CALC-MCNC: 130 MG/DL
MCH RBC QN AUTO: 27.1 PG (ref 26.5–33)
MCHC RBC AUTO-ENTMCNC: 32.1 G/DL (ref 31.5–36.5)
MCV RBC AUTO: 84 FL (ref 78–100)
NONHDLC SERPL-MCNC: 155 MG/DL
PLATELET # BLD AUTO: 341 10E3/UL (ref 150–450)
RBC # BLD AUTO: 4.54 10E6/UL (ref 3.8–5.2)
TRIGL SERPL-MCNC: 125 MG/DL
WBC # BLD AUTO: 8.1 10E3/UL (ref 4–11)

## 2023-01-30 PROCEDURE — 85027 COMPLETE CBC AUTOMATED: CPT | Performed by: FAMILY MEDICINE

## 2023-01-30 PROCEDURE — 84702 CHORIONIC GONADOTROPIN TEST: CPT | Performed by: FAMILY MEDICINE

## 2023-01-30 PROCEDURE — 36415 COLL VENOUS BLD VENIPUNCTURE: CPT | Performed by: FAMILY MEDICINE

## 2023-01-30 PROCEDURE — 99213 OFFICE O/P EST LOW 20 MIN: CPT | Performed by: FAMILY MEDICINE

## 2023-01-30 PROCEDURE — 80061 LIPID PANEL: CPT | Performed by: FAMILY MEDICINE

## 2023-01-30 ASSESSMENT — ASTHMA QUESTIONNAIRES
QUESTION_3 LAST FOUR WEEKS HOW OFTEN DID YOUR ASTHMA SYMPTOMS (WHEEZING, COUGHING, SHORTNESS OF BREATH, CHEST TIGHTNESS OR PAIN) WAKE YOU UP AT NIGHT OR EARLIER THAN USUAL IN THE MORNING: NOT AT ALL
QUESTION_4 LAST FOUR WEEKS HOW OFTEN HAVE YOU USED YOUR RESCUE INHALER OR NEBULIZER MEDICATION (SUCH AS ALBUTEROL): NOT AT ALL
ACT_TOTALSCORE: 25
QUESTION_2 LAST FOUR WEEKS HOW OFTEN HAVE YOU HAD SHORTNESS OF BREATH: NOT AT ALL
QUESTION_5 LAST FOUR WEEKS HOW WOULD YOU RATE YOUR ASTHMA CONTROL: COMPLETELY CONTROLLED
ACT_TOTALSCORE: 25
QUESTION_1 LAST FOUR WEEKS HOW MUCH OF THE TIME DID YOUR ASTHMA KEEP YOU FROM GETTING AS MUCH DONE AT WORK, SCHOOL OR AT HOME: NONE OF THE TIME

## 2023-01-30 NOTE — PROGRESS NOTES
Assessment & Plan     Threatened   Bleeding has now slowed significantly. Repeat quant hCG today and follow up based on results. Repeat hgb to make sure she has not become anemic.   - HCG quantitative pregnancy  - CBC with platelets                 BMI:   Estimated body mass index is 34.27 kg/m  as calculated from the following:    Height as of this encounter: 1.524 m (5').    Weight as of this encounter: 79.6 kg (175 lb 8 oz).           No follow-ups on file.    Shabnam Hoover MD  [facility] STAR Chavira is a 28 year old, presenting for the following health issues:  Vaginal Bleeding (Heavy bleeding 2-3 weeks )      HPI   Here for follow up of threatened AB   (2019)    Pos home UPT , bleeding started 3 days before that and has had daily bleeding since. 6-7 pads or tampons per day. Similar bleeding occurred in 2019 with SAB.     ER visit  for early pregnancy bleeding. Pelvic ultrasound neg. Quant . Hgb 13.     Was having lower abd/pelvic pain earlier in the course of bleeding, which has resolved.     Gave birth 5 months ago, initially on depo provera, but forgot to come for next depo injection about 2 months ago.    B pos blood type    No fevers or chills, but ever since bleeding started she has periods of feeling hot and cold. No foul discharge.           Review of Systems         Objective    /70   Pulse 89   Temp 97.9  F (36.6  C) (Oral)   Resp 16   Ht 1.524 m (5')   Wt 79.6 kg (175 lb 8 oz)   LMP 2022 (Approximate)   SpO2 99%   BMI 34.27 kg/m    Body mass index is 34.27 kg/m .  Physical Exam   Gen: well appearing, NAD  Skin: no pallor  Pelvis: normal external genitalia. Speculum exam reveals normal closed cervical os, no bleeding or blood in vaginal vault. No abnormal discharge

## 2023-01-31 ENCOUNTER — TELEPHONE (OUTPATIENT)
Dept: FAMILY MEDICINE | Facility: CLINIC | Age: 28
End: 2023-01-31
Payer: COMMERCIAL

## 2023-01-31 ENCOUNTER — TELEPHONE (OUTPATIENT)
Dept: NURSING | Facility: CLINIC | Age: 28
End: 2023-01-31
Payer: COMMERCIAL

## 2023-01-31 NOTE — TELEPHONE ENCOUNTER
Telephone call  Patient calling back after missing a call.  Red her Doctor Kiko's message and she was transferred to [facility] for a appointment for her nest lab work.    Bettina Bourgeois RN   [facility] Nurse Advisor  12:50 PM 1/31/2023

## 2023-01-31 NOTE — TELEPHONE ENCOUNTER
Called pt in an attempt to relay lab result. Left VM to call clinic back.    - if pt calls back, please relay Dr. Hoover's message. Thanks    [facility] Sherman Say, BSN RN  [facility]    ----- Message from Shabnam Hoover MD sent at 1/30/2023  7:24 PM CST -----  Please call w results - Pregnancy hormone has decreased significantly, consistent with miscarriage, like we suspected. The hormone needs to be checked weekly until <5. I have placed order for lab only appt in one week. It is important to not get pregnant before the lab goes back to normal.

## 2023-01-31 NOTE — LETTER
"February 2, 2023      Fan QIANA Duran  [address] [place] [address]        Dear ,    We are writing to inform you of your test results.    Test results indicate you may require additional follow up, see comment below.    \"Pregnancy hormone has decreased significantly, consistent with miscarriage, like we suspected. The hormone needs to be checked weekly until <5. I have placed order for lab only appt in one week. It is important to not get pregnant before the lab goes back to normal.\"  Resulted Orders   HCG quantitative pregnancy   Result Value Ref Range    hCG Quantitative 35 (H) <5 mIU/mL      Comment:      Adult: 0-5 mIU/mL for healthy non-pregnant person  Neonates: Should be within normal ranges by 2 days after birth     CBC with platelets   Result Value Ref Range    WBC Count 8.1 4.0 - 11.0 10e3/uL    RBC Count 4.54 3.80 - 5.20 10e6/uL    Hemoglobin 12.3 11.7 - 15.7 g/dL    Hematocrit 38.3 35.0 - 47.0 %    MCV 84 78 - 100 fL    MCH 27.1 26.5 - 33.0 pg    MCHC 32.1 31.5 - 36.5 g/dL    RDW 12.4 10.0 - 15.0 %    Platelet Count 341 150 - 450 10e3/uL       If you have any questions or concerns, please call the clinic at the number listed above.     Sincerely,    Dr. Hoover  "

## 2023-02-01 NOTE — TELEPHONE ENCOUNTER
Writer attempt #2 to call pt regarding Dr. Hoover's message below. No answer, left non-detailed VM with call back number.    If pt calls back, please relay Dr. Hoover's message to patient. Thanks.    BESSY ConroyN, RN   [facility]

## 2023-02-02 NOTE — TELEPHONE ENCOUNTER
Writer attempt #3 to call pt regarding Dr. Hoover's message below. No answer/busy.     Third attempt, letter will be sent.    Closing encounter.     BESSY ConroyN, RN              [facility]

## 2023-02-03 ENCOUNTER — HOSPITAL ENCOUNTER (EMERGENCY)
Facility: HOSPITAL | Age: 28
Discharge: HOME | End: 2023-02-03
Attending: EMERGENCY MEDICINE | Admitting: EMERGENCY MEDICINE
Payer: COMMERCIAL

## 2023-02-03 ENCOUNTER — APPOINTMENT (OUTPATIENT)
Dept: RADIOLOGY | Facility: HOSPITAL | Age: 28
End: 2023-02-03
Attending: EMERGENCY MEDICINE
Payer: COMMERCIAL

## 2023-02-03 VITALS
RESPIRATION RATE: 20 BRPM | HEART RATE: 90 BPM | SYSTOLIC BLOOD PRESSURE: 109 MMHG | DIASTOLIC BLOOD PRESSURE: 68 MMHG | OXYGEN SATURATION: 98 % | TEMPERATURE: 99.6 F | WEIGHT: 175 LBS | HEIGHT: 60 IN | BODY MASS INDEX: 34.36 KG/M2

## 2023-02-03 DIAGNOSIS — J06.9 UPPER RESPIRATORY TRACT INFECTION, UNSPECIFIED TYPE: ICD-10-CM

## 2023-02-03 LAB
FLUAV RNA SPEC QL NAA+PROBE: NEGATIVE
FLUBV RNA RESP QL NAA+PROBE: NEGATIVE
RSV RNA SPEC NAA+PROBE: NEGATIVE
SARS-COV-2 RNA RESP QL NAA+PROBE: NEGATIVE

## 2023-02-03 PROCEDURE — 87637 SARSCOV2&INF A&B&RSV AMP PRB: CPT | Performed by: EMERGENCY MEDICINE

## 2023-02-03 PROCEDURE — 99284 EMERGENCY DEPT VISIT MOD MDM: CPT | Mod: CS,25

## 2023-02-03 PROCEDURE — C9803 HOPD COVID-19 SPEC COLLECT: HCPCS

## 2023-02-03 PROCEDURE — 71046 X-RAY EXAM CHEST 2 VIEWS: CPT

## 2023-02-03 ASSESSMENT — ACTIVITIES OF DAILY LIVING (ADL): ADLS_ACUITY_SCORE: 33

## 2023-02-03 NOTE — Clinical Note
Fan Duran was seen and treated in our emergency department on 2/3/2023.  She may return to work on 02/08/2023.       If you have any questions or concerns, please don't hesitate to call.      Anitha Giles MD

## 2023-02-04 NOTE — ED TRIAGE NOTES
Pt here with cough, congestion, headache, body aches and fever for the last 2 days or so. She has had some tylenol without consistent relief. Having chest pain when she coughs, cough is productive sounding.      Triage Assessment     Row Name 02/03/23 2031       Triage Assessment (Adult)    Airway WDL WDL       Respiratory WDL    Respiratory WDL cough    Cough Frequency infrequent    Cough Type productive       Skin Circulation/Temperature WDL    Skin Circulation/Temperature WDL WDL       Cardiac WDL    Cardiac WDL WDL       Peripheral/Neurovascular WDL    Peripheral Neurovascular WDL WDL       Cognitive/Neuro/Behavioral WDL    Cognitive/Neuro/Behavioral WDL WDL

## 2023-02-04 NOTE — DISCHARGE INSTRUCTIONS
Please follow-up with your Primary Care Provider within 3-4 days for a recheck; call to arrange appointment.    Return to the ER for worsening symptoms, worsening cough, if you develop shortness of breath, persistent vomiting, persistent fevers or other concerns.

## 2023-02-04 NOTE — ED PROVIDER NOTES
Emergency Department Encounter     Evaluation Date & Time:   2/3/2023  8:36 PM    CHIEF COMPLAINT:  Nasal Congestion, Fever, and Cough      Triage Note:  Pt here with cough, congestion, headache, body aches and fever for the last 2 days or so. She has had some tylenol without consistent relief. Having chest pain when she coughs, cough is productive sounding.      Triage Assessment     Row Name 23       Triage Assessment (Adult)    Airway WDL WDL       Respiratory WDL    Respiratory WDL cough    Cough Frequency infrequent    Cough Type productive       Skin Circulation/Temperature WDL    Skin Circulation/Temperature WDL WDL       Cardiac WDL    Cardiac WDL WDL       Peripheral/Neurovascular WDL    Peripheral Neurovascular WDL WDL       Cognitive/Neuro/Behavioral WDL    Cognitive/Neuro/Behavioral WDL WDL                    Impression and Plan       FINAL IMPRESSION:    ICD-10-CM    1. Upper respiratory tract infection, unspecified type  J06.9             ED COURSE & MEDICAL DECISION MAKIN:40 PM I met with the patient, obtained history, performed an initial exam, and discussed options and plan for diagnostics and treatment here in the ED. PPE worn including surgical mask, surgical gloves, surgical gown.    28 year old female, otherwise healthy, who presents for evaluation of URI symptoms with cough, nasal and chest congestion, body aches, headaches and subjective fevers for the past 2 days. She reports chest pain that occurs only with coughing and denies shortness of breath. Patient states she is vaccinated against COVID-19 and the flu.    Daughter has had similar symptoms and tested positive in our ED tonight for COVID.    On exam, she has no respiratory distress with clear and symmetrical breath sounds.    COVID, influenza and RSV tests negative.    CXR performed and was negative. I do not suspect PE and risks of CTA chest felt to outweigh benefit.    Given daughter's positive COVID test, patient  likely with COVID or other viral URI; I do not think antibiotics are indicated.     Vitals are reassuring and I do not think blood work and / or admission are indicated.  Patient discharged to home with follow-up with her primary care provider this upcoming week for recheck.  Return precautions provided.  Patient stable throughout ED course.      At the conclusion of the encounter I discussed the results of all the tests and the disposition. The questions were answered. The patient and family acknowledged understanding and were agreeable with the care plan.    Medical Decision Making    Work Up:    Chart documentation includes differential considered and any EKGs or imaging independently interpreted by provider, where specified.    In additional to work up documented, I considered the following work up: Documented in chart, if applicable.      Disposition considerations: Discharge. No recommendations on prescription strength medication(s). I considered admission, but ultimately discharged patient given reassuring vitals, evaluation and exam..      MEDICATIONS GIVEN IN THE EMERGENCY DEPARTMENT:  Medications - No data to display    NEW PRESCRIPTIONS STARTED AT TODAY'S ED VISIT:  There are no discharge medications for this patient.      HPI     History obtained from: Patient      Fan Duran is a 28 year old female, otherwise healthy, who presents to this ED by private vehicle with her daughter and significant other for evaluation of URI symptoms x 2 days. Patient endorses subjective fevers with cough, nasal and chest congestion, body aches and headache for the past 2 days. Patient states she is vaccinated against COVID-19 and the flu.    She otherwise reports chest pain that occurs only when she coughs. She denies associated shortness of breath. No abdominal pain, nausea, vomiting, diarrhea or other concerns.     Here with infant daughter who has similar symptoms.     REVIEW OF SYSTEMS:  All other systems  reviewed and are negative.      Medical History     Past Medical History:   Diagnosis Date     Asthma 2019     H/O: iron deficiency anemia      Miscarriage 10/1/2019     Overweight        Past Surgical History:   Procedure Laterality Date     NO PAST SURGERIES         Family History   Problem Relation Age of Onset     Depression Mother      Cancer Father         esophageal cancer - getting treatment; he is a smoker     Hepatitis Father         C     Mental Illness Daughter      No Known Problems Son      No Known Problems Other        Social History     Tobacco Use     Smoking status: Never     Passive exposure: Current     Smokeless tobacco: Never     Tobacco comments:     passive smoker - parents smoked   Substance Use Topics     Alcohol use: Never     Drug use: Never       No current outpatient medications on file.      Physical Exam     First Vitals:  Patient Vitals for the past 24 hrs:   BP Temp Temp src Pulse Resp SpO2 Height Weight   02/03/23 2200 109/68 -- -- 90 20 98 % -- --   02/03/23 2030 132/77 99.6  F (37.6  C) Oral 111 20 97 % 1.524 m (5') 79.4 kg (175 lb)       PHYSICAL EXAM:   Physical Exam    GENERAL: Awake, alert.  In no acute distress.   HEENT: Normocephalic, atraumatic. Pupils equal, round and reactive. Conjunctiva normal.   NECK: No stridor.  PULMONARY: Symmetrical breath sounds without distress.  Lungs clear to auscultation bilaterally without wheezes, rhonchi or rales.  CARDIO: Borderline tachycardic rate with regular rhythm.  No significant murmur, rub or gallop.  Radial pulses strong and symmetrical.  ABDOMINAL: Abdomen soft, non-distended and non-tender to palpation.    EXTREMITIES: No lower extremity swelling or edema.      NEURO: Alert and oriented to person, place and time.  Cranial nerves grossly intact.  No focal motor deficit.  PSYCH: Normal mood and affect.      Results     LAB:  All pertinent labs reviewed and interpreted  Labs Ordered and Resulted from Time of ED Arrival to Time of  ED Departure   INFLUENZA A/B & SARS-COV2 PCR MULTIPLEX - Normal       Result Value    Influenza A PCR Negative      Influenza B PCR Negative      RSV PCR Negative      SARS CoV2 PCR Negative         RADIOLOGY:  Chest XR,  PA & LAT   Final Result   IMPRESSION: Negative chest.            I, Karlos Parson, am serving as a scribe to document services personally performed by Anitha Giles MD based on my observation and the provider's statements to me. I, Anitha Giles MD attest that Karlos Parson is acting in a scribe capacity, has observed my performance of the services and has documented them in accordance with my direction.    Anitha Giles MD  Emergency Medicine  [facility] EMERGENCY DEPARTMENT         Anitha Giles MD  02/04/23 0930

## 2023-02-06 ENCOUNTER — LAB (OUTPATIENT)
Dept: LAB | Facility: CLINIC | Age: 28
End: 2023-02-06
Payer: COMMERCIAL

## 2023-02-06 DIAGNOSIS — O03.9 MISCARRIAGE: ICD-10-CM

## 2023-02-06 LAB — HCG INTACT+B SERPL-ACNC: 5 MIU/ML

## 2023-02-06 PROCEDURE — 36415 COLL VENOUS BLD VENIPUNCTURE: CPT

## 2023-02-06 PROCEDURE — 84702 CHORIONIC GONADOTROPIN TEST: CPT

## 2023-02-07 ENCOUNTER — TELEPHONE (OUTPATIENT)
Dept: FAMILY MEDICINE | Facility: CLINIC | Age: 28
End: 2023-02-07
Payer: COMMERCIAL

## 2023-02-07 NOTE — TELEPHONE ENCOUNTER
Hormone level continues to improve. Needs another lab check in one week.     Called patient in attempt to relay provider test message above.  However, no answer.  Message left on vm request a call back with clinic number provided.    BESSY LazaroN, RN  [facility]

## 2023-02-07 NOTE — TELEPHONE ENCOUNTER
Provider Recommendation Follow Up:   Reached patient/caregiver. Informed of provider's recommendations. Patient verbalized understanding and agrees with the plan. Transferred to scheduling to schedule a lab only appointment.    Juliane Stone RN  [place] Nurse Advisor  10:26 AM  2/7/2023

## 2023-02-21 ENCOUNTER — LAB (OUTPATIENT)
Dept: LAB | Facility: CLINIC | Age: 28
End: 2023-02-21
Payer: COMMERCIAL

## 2023-02-21 DIAGNOSIS — O03.9 MISCARRIAGE: ICD-10-CM

## 2023-02-21 LAB — HCG INTACT+B SERPL-ACNC: <1 MIU/ML

## 2023-02-21 PROCEDURE — 84702 CHORIONIC GONADOTROPIN TEST: CPT

## 2023-02-21 PROCEDURE — 36415 COLL VENOUS BLD VENIPUNCTURE: CPT

## 2023-06-21 ENCOUNTER — TRANSFERRED RECORDS (OUTPATIENT)
Dept: HEALTH INFORMATION MANAGEMENT | Facility: CLINIC | Age: 28
End: 2023-06-21
Payer: COMMERCIAL

## 2023-07-30 ENCOUNTER — HEALTH MAINTENANCE LETTER (OUTPATIENT)
Age: 28
End: 2023-07-30

## 2023-08-23 ENCOUNTER — LAB (OUTPATIENT)
Dept: LAB | Facility: CLINIC | Age: 28
End: 2023-08-23
Payer: COMMERCIAL

## 2023-08-23 ENCOUNTER — ALLIED HEALTH/NURSE VISIT (OUTPATIENT)
Dept: FAMILY MEDICINE | Facility: CLINIC | Age: 28
End: 2023-08-23
Payer: COMMERCIAL

## 2023-08-23 DIAGNOSIS — Z30.013 EVALUATION FOR CONTRACEPTIVE INJECTION: ICD-10-CM

## 2023-08-23 DIAGNOSIS — Z30.9 CONTRACEPTIVE MANAGEMENT: Primary | ICD-10-CM

## 2023-08-23 DIAGNOSIS — Z30.013 EVALUATION FOR CONTRACEPTIVE INJECTION: Primary | ICD-10-CM

## 2023-08-23 LAB — HCG UR QL: NEGATIVE

## 2023-08-23 PROCEDURE — 81025 URINE PREGNANCY TEST: CPT

## 2023-08-23 PROCEDURE — 96372 THER/PROPH/DIAG INJ SC/IM: CPT | Performed by: FAMILY MEDICINE

## 2023-08-23 PROCEDURE — 99207 PR NO CHARGE NURSE ONLY: CPT

## 2023-08-23 RX ADMIN — MEDROXYPROGESTERONE ACETATE 150 MG: 150 INJECTION, SUSPENSION INTRAMUSCULAR at 13:18

## 2023-08-23 NOTE — PROGRESS NOTES
Clinic Administered Medication Documentation      Depo Provera Documentation    Depo-Provera Standing Order inclusion/exclusion criteria reviewed.     Is this the initial or subsequent dose of Depo Provera? Subsequent dose - patient is not within the acceptable window of time (11-15 weeks) for subsequent injection. Pregnancy test is indicated. Pregnancy test result: negative.    Patient meets: inclusion criteria     Is there an active order (written within the past 365 days, with administrations remaining, not ) in the chart? Yes.     Prior to injection, verified patient identity using patient's name and date of birth. Medication was administered. Please see MAR and medication order for additional information.     Vial/Syringe: Single dose vial. Was entire vial of medication used? Yes    Patient instructed to remain in clinic for 15 minutes and report any adverse reaction to staff immediately but patient declined.  NEXT INJECTION DUE: 23 - 23    Patient has no refills remaining. Routed to the provider and Patient will schedule physical /depo injection with provider today.  Daughter was seen by  patient request to be seen for depo injection and was added to nurse schedule ok per  to give depo injection if negative UPT.

## 2023-11-30 ENCOUNTER — TELEPHONE (OUTPATIENT)
Dept: FAMILY MEDICINE | Facility: CLINIC | Age: 28
End: 2023-11-30

## 2023-11-30 DIAGNOSIS — Z30.013 EVALUATION FOR CONTRACEPTIVE INJECTION: ICD-10-CM

## 2023-11-30 RX ORDER — MEDROXYPROGESTERONE ACETATE 150 MG/ML
150 INJECTION, SUSPENSION INTRAMUSCULAR
Status: DISCONTINUED | OUTPATIENT
Start: 2023-11-30 | End: 2024-09-12

## 2023-11-30 NOTE — TELEPHONE ENCOUNTER
Dr. Howell  Please review pended Depo medication.  Patient has an appt to come in for the recurrent dose today but current order  23.  Last provider visit: 23; future appt: 2024 for routine physical.    Thank you    Miguel

## 2024-01-12 ENCOUNTER — OFFICE VISIT (OUTPATIENT)
Dept: FAMILY MEDICINE | Facility: CLINIC | Age: 29
End: 2024-01-12
Payer: COMMERCIAL

## 2024-01-12 VITALS
OXYGEN SATURATION: 98 % | SYSTOLIC BLOOD PRESSURE: 98 MMHG | HEIGHT: 60 IN | DIASTOLIC BLOOD PRESSURE: 66 MMHG | RESPIRATION RATE: 16 BRPM | TEMPERATURE: 98.1 F | WEIGHT: 172.3 LBS | BODY MASS INDEX: 33.83 KG/M2 | HEART RATE: 73 BPM

## 2024-01-12 DIAGNOSIS — R74.01 TRANSAMINITIS: ICD-10-CM

## 2024-01-12 DIAGNOSIS — Z00.00 ROUTINE GENERAL MEDICAL EXAMINATION AT A HEALTH CARE FACILITY: Primary | ICD-10-CM

## 2024-01-12 DIAGNOSIS — Z78.9 USES HORMONAL CONTRACEPTIVE PATCH AS PRIMARY BIRTH CONTROL METHOD: ICD-10-CM

## 2024-01-12 DIAGNOSIS — N91.2 AMENORRHEA: Primary | ICD-10-CM

## 2024-01-12 DIAGNOSIS — Z13.220 LIPID SCREENING: ICD-10-CM

## 2024-01-12 DIAGNOSIS — L83 ACANTHOSIS NIGRICANS: ICD-10-CM

## 2024-01-12 DIAGNOSIS — Z12.4 CERVICAL CANCER SCREENING: ICD-10-CM

## 2024-01-12 DIAGNOSIS — Z11.3 SCREEN FOR STD (SEXUALLY TRANSMITTED DISEASE): ICD-10-CM

## 2024-01-12 DIAGNOSIS — Z86.32 HISTORY OF DIET CONTROLLED GESTATIONAL DIABETES MELLITUS (GDM): ICD-10-CM

## 2024-01-12 DIAGNOSIS — N91.2 AMENORRHEA: ICD-10-CM

## 2024-01-12 DIAGNOSIS — Z76.0 ENCOUNTER FOR MEDICATION REFILL: ICD-10-CM

## 2024-01-12 LAB
ERYTHROCYTE [DISTWIDTH] IN BLOOD BY AUTOMATED COUNT: 12.8 % (ref 10–15)
HBA1C MFR BLD: 6.6 % (ref 0–5.6)
HCT VFR BLD AUTO: 40.5 % (ref 35–47)
HGB BLD-MCNC: 13.5 G/DL (ref 11.7–15.7)
MCH RBC QN AUTO: 27.8 PG (ref 26.5–33)
MCHC RBC AUTO-ENTMCNC: 33.3 G/DL (ref 31.5–36.5)
MCV RBC AUTO: 84 FL (ref 78–100)
PLATELET # BLD AUTO: 287 10E3/UL (ref 150–450)
RBC # BLD AUTO: 4.85 10E6/UL (ref 3.8–5.2)
WBC # BLD AUTO: 9.7 10E3/UL (ref 4–11)

## 2024-01-12 PROCEDURE — 80053 COMPREHEN METABOLIC PANEL: CPT | Performed by: FAMILY MEDICINE

## 2024-01-12 PROCEDURE — 87591 N.GONORRHOEAE DNA AMP PROB: CPT | Performed by: FAMILY MEDICINE

## 2024-01-12 PROCEDURE — 87491 CHLMYD TRACH DNA AMP PROBE: CPT | Performed by: FAMILY MEDICINE

## 2024-01-12 PROCEDURE — 85027 COMPLETE CBC AUTOMATED: CPT | Performed by: FAMILY MEDICINE

## 2024-01-12 PROCEDURE — 36415 COLL VENOUS BLD VENIPUNCTURE: CPT | Performed by: FAMILY MEDICINE

## 2024-01-12 PROCEDURE — 83036 HEMOGLOBIN GLYCOSYLATED A1C: CPT | Performed by: FAMILY MEDICINE

## 2024-01-12 PROCEDURE — 80061 LIPID PANEL: CPT | Performed by: FAMILY MEDICINE

## 2024-01-12 PROCEDURE — 99214 OFFICE O/P EST MOD 30 MIN: CPT | Mod: 25 | Performed by: FAMILY MEDICINE

## 2024-01-12 PROCEDURE — G0145 SCR C/V CYTO,THINLAYER,RESCR: HCPCS | Performed by: FAMILY MEDICINE

## 2024-01-12 PROCEDURE — 99395 PREV VISIT EST AGE 18-39: CPT | Performed by: FAMILY MEDICINE

## 2024-01-12 PROCEDURE — 84702 CHORIONIC GONADOTROPIN TEST: CPT | Performed by: FAMILY MEDICINE

## 2024-01-12 RX ORDER — NORELGESTROMIN AND ETHINYL ESTRADIOL 35; 150 UG/MG; UG/MG
PATCH TRANSDERMAL
Qty: 9 PATCH | Refills: 3 | Status: SHIPPED | OUTPATIENT
Start: 2024-01-12 | End: 2024-09-12

## 2024-01-12 RX ORDER — ALBUTEROL SULFATE 90 UG/1
2 AEROSOL, METERED RESPIRATORY (INHALATION) EVERY 6 HOURS PRN
Qty: 18 G | Refills: 6 | Status: SHIPPED | OUTPATIENT
Start: 2024-01-12

## 2024-01-12 ASSESSMENT — ENCOUNTER SYMPTOMS
DIZZINESS: 0
MYALGIAS: 0
WEAKNESS: 0
JOINT SWELLING: 0
DIARRHEA: 0
HEARTBURN: 0
ARTHRALGIAS: 0
BREAST MASS: 0
SORE THROAT: 0
ABDOMINAL PAIN: 0
DYSURIA: 0
CONSTIPATION: 0
FEVER: 0
HEMATURIA: 0
SHORTNESS OF BREATH: 0
HEADACHES: 0
COUGH: 0
FREQUENCY: 0
EYE PAIN: 0
NERVOUS/ANXIOUS: 0
CHILLS: 0
PALPITATIONS: 0
NAUSEA: 0
HEMATOCHEZIA: 0
PARESTHESIAS: 0

## 2024-01-12 ASSESSMENT — ASTHMA QUESTIONNAIRES
QUESTION_2 LAST FOUR WEEKS HOW OFTEN HAVE YOU HAD SHORTNESS OF BREATH: ONCE OR TWICE A WEEK
ACT_TOTALSCORE: 11
QUESTION_1 LAST FOUR WEEKS HOW MUCH OF THE TIME DID YOUR ASTHMA KEEP YOU FROM GETTING AS MUCH DONE AT WORK, SCHOOL OR AT HOME: SOME OF THE TIME
QUESTION_3 LAST FOUR WEEKS HOW OFTEN DID YOUR ASTHMA SYMPTOMS (WHEEZING, COUGHING, SHORTNESS OF BREATH, CHEST TIGHTNESS OR PAIN) WAKE YOU UP AT NIGHT OR EARLIER THAN USUAL IN THE MORNING: TWO OR THREE NIGHTS A WEEK
QUESTION_5 LAST FOUR WEEKS HOW WOULD YOU RATE YOUR ASTHMA CONTROL: NOT CONTROLLED AT ALL
QUESTION_4 LAST FOUR WEEKS HOW OFTEN HAVE YOU USED YOUR RESCUE INHALER OR NEBULIZER MEDICATION (SUCH AS ALBUTEROL): THREE OR MORE TIMES PER DAY
ACT_TOTALSCORE: 11

## 2024-01-12 NOTE — PROGRESS NOTES
SUBJECTIVE:   Fan is a 28 year old, presenting for the following:  Physical (Pt want blood work to check everything. Pt last period was on november and wonder if it relating to depo injection (last injection was on August).), Recheck Medication (Pt need inhaler for asthma), and Derm Problem (Darkness around neck and wonder how to get rid of it.)        1/12/2024    10:09 AM   Additional Questions   Roomed by argelia miranda MA   Accompanied by self       Healthy Habits:     Getting at least 3 servings of Calcium per day:  Yes    Bi-annual eye exam:  Yes    Dental care twice a year:  NO    Sleep apnea or symptoms of sleep apnea:  None    Diet:  Other    Frequency of exercise:  4-5 days/week    Duration of exercise:  15-30 minutes    Taking medications regularly:  No    Barriers to taking medications:  None    Medication side effects:  None    Additional concerns today:  No    Darkness of skin around neck  Non-pruritic  History of gestational diabetes- last A1C was 6.4  Due for recheck today    History of reactive airway- she previously had inhaler but had not needed it, however, she has noticed recently that cold air and certain fumes will trigger her breathing. Would like refill of inhaler.    Depo- missed her appointment in November  She has not had period- wondering if this is related  Does not thinks she is pregnant  Her  wants another child- she would like to work for some time to save money   She may want another pregnancy in the future- she would like to use patch for birth control    Today's PHQ-2 Score:       1/12/2024     9:59 AM   PHQ-2 ( 1999 [employer])   Q1: Little interest or pleasure in doing things 1   Q2: Feeling down, depressed or hopeless 1   PHQ-2 Score 2   Q1: Little interest or pleasure in doing things Several days   Q2: Feeling down, depressed or hopeless Several days   PHQ-2 Score 2         Social History     Tobacco Use    Smoking status: Never     Passive exposure: Current    Smokeless  tobacco: Never    Tobacco comments:     passive smoker - parents smoked   Substance Use Topics    Alcohol use: Never             1/12/2024     9:58 AM   Alcohol Use   Prescreen: >3 drinks/day or >7 drinks/week? Not Applicable     Reviewed orders with patient.  Reviewed health maintenance and updated orders accordingly - yes      Breast Cancer Screening:    FHS-7:       1/12/2024    10:03 AM   Breast CA Risk Assessment (FHS-7)   Did any of your first-degree relatives have breast or ovarian cancer? Unknown   Did any of your relatives have bilateral breast cancer? Unknown   Did any man in your family have breast cancer? No   Did any woman in your family have breast and ovarian cancer? No   Did any woman in your family have breast cancer before age 50 y? No   Do you have 2 or more relatives with breast and/or ovarian cancer? No   Do you have 2 or more relatives with breast and/or bowel cancer? No         Pertinent mammograms are reviewed under the imaging tab.          Latest Ref Rng & Units 1/3/2020     3:32 PM   PAP / HPV   PAP Negative for squamous intraepithelial lesion or malignancy. Negative for squamous intraepithelial lesion or malignancy  Electronically signed by Shabnam Mcallister CT (ASCP) on 1/7/2020 at 11:36 AM        Reviewed and updated as needed this visit by clinical staff   Tobacco  Allergies  Meds              Reviewed and updated as needed this visit by Provider                     Review of Systems   Constitutional:  Negative for chills and fever.   HENT:  Negative for congestion, ear pain, hearing loss and sore throat.    Eyes:  Negative for pain and visual disturbance.   Respiratory:  Negative for cough and shortness of breath.    Cardiovascular:  Negative for chest pain, palpitations and peripheral edema.   Gastrointestinal:  Negative for abdominal pain, constipation, diarrhea, heartburn, hematochezia and nausea.   Breasts:  Negative for tenderness, breast mass and discharge.  "  Genitourinary:  Negative for dysuria, frequency, genital sores, hematuria, pelvic pain, urgency, vaginal bleeding and vaginal discharge.   Musculoskeletal:  Negative for arthralgias, joint swelling and myalgias.   Skin:  Negative for rash.   Neurological:  Negative for dizziness, weakness, headaches and paresthesias.   Psychiatric/Behavioral:  Negative for mood changes. The patient is not nervous/anxious.           OBJECTIVE:   BP 98/66   Pulse 73   Temp 98.1  F (36.7  C) (Oral)   Resp 16   Ht 1.514 m (4' 11.61\")   Wt 78.2 kg (172 lb 4.8 oz)   LMP 10/27/2023   SpO2 98%   BMI 34.10 kg/m    Physical Exam  General: Alert and oriented, in NAD.  Eyes: PERRL, EOMI, sclera normal.  HENT: Normocephalic, no pharyngeal erythema, MMM.  Neck: Supple, no adenopathy.  Heart: Normal S1 and S2, regular rhythm. No murmurs, gallops, rubs.  Lungs: CTA bilaterally, no wheezes, no crackles, no rhonchi.  Abdomen: Soft, non-tender, non-distended, BS+.   MSK: Normal ROM of upper and lower extremities.  Neuro: Alert and oriented x 3. Moves all extremities. No focal deficits noted.  Extremities: Peripheral pulses intact, no peripheral edema.  Skin: Warm and well perfused, no rashes noted.  Psych: Normal mood and affect.      ASSESSMENT/PLAN:     Fan was seen today for physical, recheck medication and derm problem.    Diagnoses and all orders for this visit:    Routine general medical examination at a health care facility    Cervical cancer screening: Pap today.  -     Pap Screen reflex to HPV if ASCUS - recommend age 25 - 29    History of diet controlled gestational diabetes mellitus (GDM): Last A1C 6.4 and in the setting of acanthosis nigricans, suspect insulin resistance and will check A1C.  -     Hemoglobin A1c; Future  -     Hemoglobin A1c    Amenorrhea: Likely 2/2 depo, although she did miss her last dose. Check UPT. She does not desire pregnancy at this time and wishes to move forward with birth control patch.  -     " "HCG quantitative pregnancy; Future  -     CBC with platelets; Future  -     HCG quantitative pregnancy  -     CBC with platelets    Uses hormonal contraceptive patch as primary birth control method: reviewed how to use.  -     norelgestromin-ethinyl estradiol (ORTHO EVRA) 150-35 MCG/24HR patch; Remove old patch and apply new patch onto the skin once a week for 3 weeks (21 days). Do not wear patch week 4 (days 22-28), then repeat.    Lipid screening  -     Lipid panel reflex to direct LDL Non-fasting; Future  -     Lipid panel reflex to direct LDL Non-fasting    Encounter for medication refill: Previously used inhaler- triggers include cold air and strong smells/fumes.  -     albuterol (PROAIR HFA/PROVENTIL HFA/VENTOLIN HFA) 108 (90 Base) MCG/ACT inhaler; Inhale 2 puffs into the lungs every 6 hours as needed for shortness of breath, wheezing or cough    Transaminitis: likely fatty liver, but will recheck labs. Consider US pending labs.  -     Comprehensive metabolic panel (BMP + Alb, Alk Phos, ALT, AST, Total. Bili, TP); Future  -     Comprehensive metabolic panel (BMP + Alb, Alk Phos, ALT, AST, Total. Bili, TP)    Screen for STD (sexually transmitted disease): Asymptomatic screening.  -     Neisseria gonorrhoeae PCR - Clinic Collect  -     Chlamydia trachomatis PCR - Clinic Collect    Acanthosis nigricans: Check A1C- reviewed importance of lifestyle modification.    Other orders  -     REVIEW OF HEALTH MAINTENANCE PROTOCOL ORDERS  -     PRIMARY CARE FOLLOW-UP SCHEDULING; Future        COUNSELING:  Reviewed preventive health counseling, as reflected in patient instructions      BMI:   Estimated body mass index is 34.1 kg/m  as calculated from the following:    Height as of this encounter: 1.514 m (4' 11.61\").    Weight as of this encounter: 78.2 kg (172 lb 4.8 oz).         She reports that she has never smoked. She has been exposed to tobacco smoke. She has never used smokeless tobacco.          Sully Barney MD  [facility]

## 2024-01-13 LAB
ALBUMIN SERPL BCG-MCNC: 4.3 G/DL (ref 3.5–5.2)
ALP SERPL-CCNC: 74 U/L (ref 40–150)
ALT SERPL W P-5'-P-CCNC: 50 U/L (ref 0–50)
ANION GAP SERPL CALCULATED.3IONS-SCNC: 11 MMOL/L (ref 7–15)
AST SERPL W P-5'-P-CCNC: 29 U/L (ref 0–45)
BILIRUB SERPL-MCNC: 0.2 MG/DL
BUN SERPL-MCNC: 16.2 MG/DL (ref 6–20)
C TRACH DNA SPEC QL NAA+PROBE: NEGATIVE
CALCIUM SERPL-MCNC: 9.3 MG/DL (ref 8.6–10)
CHLORIDE SERPL-SCNC: 104 MMOL/L (ref 98–107)
CHOLEST SERPL-MCNC: 227 MG/DL
CREAT SERPL-MCNC: 0.57 MG/DL (ref 0.51–0.95)
DEPRECATED HCO3 PLAS-SCNC: 25 MMOL/L (ref 22–29)
EGFRCR SERPLBLD CKD-EPI 2021: >90 ML/MIN/1.73M2
FASTING STATUS PATIENT QL REPORTED: NO
GLUCOSE SERPL-MCNC: 94 MG/DL (ref 70–99)
HDLC SERPL-MCNC: 43 MG/DL
LDLC SERPL CALC-MCNC: 110 MG/DL
N GONORRHOEA DNA SPEC QL NAA+PROBE: NEGATIVE
NONHDLC SERPL-MCNC: 184 MG/DL
POTASSIUM SERPL-SCNC: 4 MMOL/L (ref 3.4–5.3)
PROT SERPL-MCNC: 7.8 G/DL (ref 6.4–8.3)
SODIUM SERPL-SCNC: 140 MMOL/L (ref 135–145)
TRIGL SERPL-MCNC: 368 MG/DL

## 2024-01-15 ENCOUNTER — TELEPHONE (OUTPATIENT)
Dept: FAMILY MEDICINE | Facility: CLINIC | Age: 29
End: 2024-01-15
Payer: COMMERCIAL

## 2024-01-15 LAB — HCG INTACT+B SERPL-ACNC: <1 MIU/ML

## 2024-01-15 NOTE — TELEPHONE ENCOUNTER
Called patient regarding lab results. Liver enzymes are normal. Her cholesterol is elevated- reviewed lifestyle modifications and will recheck in 1 year. Her A1C was now 6.6- reviewed this is in the level of diabetes. Will need to repeat to confirm diabetes. Offered diabetes education referral- she declines and has seen them during her last pregnancy. She will plan to work on lifestyle changes at home for 2 months and then we will repeat A1C. If still elevated, would be diagnostic for diabetes. Patient declines wanting to use medications at this time. Offered appointment with PCP- she prefers to see me in 2 months. Set up appointment.

## 2024-01-17 LAB
BKR LAB AP GYN ADEQUACY: NORMAL
BKR LAB AP GYN INTERPRETATION: NORMAL
BKR LAB AP HPV REFLEX: NORMAL
BKR LAB AP LMP: NORMAL
BKR LAB AP PREVIOUS ABNORMAL: NORMAL
PATH REPORT.COMMENTS IMP SPEC: NORMAL
PATH REPORT.COMMENTS IMP SPEC: NORMAL
PATH REPORT.RELEVANT HX SPEC: NORMAL

## 2024-02-22 NOTE — TELEPHONE ENCOUNTER
Hospitalist History and Physical          Patient: Niurka Chi  : 1965  MRN: [id number]     Acct: [id number]    PCP: Unknown, Provider, DO  Date of Admission: 2024  Date of Service: Pt seen/examined on 24  and Admitted to Inpatient with expected LOS greater than two midnights due to medical therapy.             Assessment and Plan:      Sepsis 2/2 pyelonephritis/ complicated cystitis  Urology contacted  SIRS criteria T >100.4, HR > 90, WBC >12- source identified   Zosyn - change with culture updates   Blood cultures x2   1.5 L given about 30 mL/kg idea BW  Anti-emetics, pain control, antipyretics\, flomax     Type II diabetes  Holding home meds   Medium SSI  Hypoglycemia protocol  Carb controlled diet     CAD  CABG hx   Continue DAPT- no gross hematuria per patient   Will hold if darrin hematuria present or procedure pending  Resume statin BP medications       Primary hypertension   Continue medications - may need to hold losartan/HCTZ combo     HLD  Statin       =======================================================================      Chief Complaint:  fevers, chills, N/V    History Of Present Illness:  Niurka Chi is a 58 y.o. female who presents to [facility] with N/Vm, fevers and chills for two days. Patient underwent lithotripsy and stent placement on . She states she has been fluctuating between chills and fevers. She denies chest, abdominal or flank pain, URI symptoms, dysuria,  darrin hematuria         Past Medical History:        Diagnosis Date    Depression     Diabetes mellitus (HCC)     Hypertension     PONV (postoperative nausea and vomiting) 2021    severe nausea and dry heaving with surgery/pt states had issues with surgery in [facility]       Past Surgical History:        Procedure Laterality Date    CHOLECYSTECTOMY  2017    COLONOSCOPY  3-5-16    CYSTOSCOPY Bilateral 2021    CYSTOSCOPY BILATERAL URETEROSCOPY, LASER LITHOTRIPSY,  Who is calling:  Patient calling she needs a depo shot. Last time she was in was 10/29, so she is technically due between 1/21 and 1/27. However,  has an appointment tomorrow at 9:40 am at [facility], and she is wanting to know if she can come in on 1/16, Thursday to have her Depo so doesn't have to schedule another appointment.([facility] did not have anything until the 28th anyway). Can we accommodate her somehow?  Please call patient to let know if can go with  even thought it is not within the time frame. Can we ask Dr. Berg if she can have the Depo early? Also she needs transportation set up if she can't come in on 1/16 with , because then we will have to schedule a separate appointment for her and will need transportation as well.    Reason for Call:  See above.  Date of last appointment with primary care: N/A  Okay to leave a detailed message:  Yes

## 2024-03-28 DIAGNOSIS — R73.09 ELEVATED HEMOGLOBIN A1C: Primary | ICD-10-CM

## 2024-03-29 ENCOUNTER — OFFICE VISIT (OUTPATIENT)
Dept: FAMILY MEDICINE | Facility: CLINIC | Age: 29
End: 2024-03-29
Payer: COMMERCIAL

## 2024-03-29 VITALS
BODY MASS INDEX: 34.36 KG/M2 | RESPIRATION RATE: 16 BRPM | WEIGHT: 175 LBS | OXYGEN SATURATION: 99 % | HEART RATE: 78 BPM | SYSTOLIC BLOOD PRESSURE: 95 MMHG | HEIGHT: 60 IN | TEMPERATURE: 98.9 F | DIASTOLIC BLOOD PRESSURE: 67 MMHG

## 2024-03-29 DIAGNOSIS — E11.9 TYPE 2 DIABETES MELLITUS WITHOUT COMPLICATION, WITHOUT LONG-TERM CURRENT USE OF INSULIN (H): Primary | ICD-10-CM

## 2024-03-29 DIAGNOSIS — R73.09 ELEVATED HEMOGLOBIN A1C: ICD-10-CM

## 2024-03-29 LAB — HBA1C MFR BLD: 6.5 % (ref 0–5.6)

## 2024-03-29 PROCEDURE — 36415 COLL VENOUS BLD VENIPUNCTURE: CPT | Performed by: FAMILY MEDICINE

## 2024-03-29 PROCEDURE — 83036 HEMOGLOBIN GLYCOSYLATED A1C: CPT | Performed by: FAMILY MEDICINE

## 2024-03-29 PROCEDURE — 99214 OFFICE O/P EST MOD 30 MIN: CPT | Performed by: FAMILY MEDICINE

## 2024-03-29 ASSESSMENT — ASTHMA QUESTIONNAIRES
QUESTION_3 LAST FOUR WEEKS HOW OFTEN DID YOUR ASTHMA SYMPTOMS (WHEEZING, COUGHING, SHORTNESS OF BREATH, CHEST TIGHTNESS OR PAIN) WAKE YOU UP AT NIGHT OR EARLIER THAN USUAL IN THE MORNING: NOT AT ALL
QUESTION_2 LAST FOUR WEEKS HOW OFTEN HAVE YOU HAD SHORTNESS OF BREATH: NOT AT ALL
QUESTION_4 LAST FOUR WEEKS HOW OFTEN HAVE YOU USED YOUR RESCUE INHALER OR NEBULIZER MEDICATION (SUCH AS ALBUTEROL): ONCE A WEEK OR LESS
ACT_TOTALSCORE: 22
ACT_TOTALSCORE: 22
QUESTION_5 LAST FOUR WEEKS HOW WOULD YOU RATE YOUR ASTHMA CONTROL: WELL CONTROLLED
QUESTION_1 LAST FOUR WEEKS HOW MUCH OF THE TIME DID YOUR ASTHMA KEEP YOU FROM GETTING AS MUCH DONE AT WORK, SCHOOL OR AT HOME: A LITTLE OF THE TIME

## 2024-03-29 NOTE — PROGRESS NOTES
"  Assessment & Plan     Type 2 diabetes mellitus without complication, without long-term current use of insulin (H): New diagnosis with 2 elevated A1C results >6.5. She has history of GDM and saw CDE- declines referral for further education. She plans to reduce carbohydrate portions and work on lifestyle modifications. Recheck A1C in 6 months- could consider adding metformin or GLP-1 if elevated above goal. Referred for eye exam.   - Adult Eye  Referral  - Hemoglobin A1c    30 minutes spent on the date of the encounter doing chart review, history and exam, documentation and further activities as noted above        BMI  Estimated body mass index is 34.63 kg/m  as calculated from the following:    Height as of this encounter: 1.514 m (4' 11.61\").    Weight as of this encounter: 79.4 kg (175 lb).             Summer Chavira is a 29 year old, presenting for the following health issues:  Follow up  (DM )      3/29/2024     8:24 AM   Additional Questions   Roomed by michael miranda   Accompanied by Self     HPI     Does not check sugars at home, although she does know how to do this due to history of GDM during pregnancy  She endorses some increased stress with her daughter's behavior at school  Here for A1C recheck    On birth control patch      Objective    BP 95/67 (BP Location: Left arm, Patient Position: Sitting, Cuff Size: Adult Regular)   Pulse 78   Temp 98.9  F (37.2  C) (Oral)   Resp 16   Ht 1.514 m (4' 11.61\")   Wt 79.4 kg (175 lb)   LMP 03/02/2024 (Exact Date)   SpO2 99%   BMI 34.63 kg/m    Body mass index is 34.63 kg/m .  Wt Readings from Last 3 Encounters:   03/29/24 79.4 kg (175 lb)   01/12/24 78.2 kg (172 lb 4.8 oz)   02/03/23 79.4 kg (175 lb)       Physical Exam   Gen: well appearing, no distress        Signed Electronically by: Sully Barney MD    "

## 2024-07-14 ENCOUNTER — HEALTH MAINTENANCE LETTER (OUTPATIENT)
Age: 29
End: 2024-07-14

## 2024-08-22 ENCOUNTER — TELEPHONE (OUTPATIENT)
Dept: FAMILY MEDICINE | Facility: CLINIC | Age: 29
End: 2024-08-22
Payer: COMMERCIAL

## 2024-08-22 NOTE — TELEPHONE ENCOUNTER
[facility] Call Center    Phone Message    May a detailed message be left on voicemail: yes     Reason for Call: Other: Patient just tested positive for pregnancy.  Patient does not know her last LMP, has irregular periods and is on the birth control patch.  Per protocols, if patient does not know LMP, sending TE to clinic. Patient is requesting to see Dr. Sully Barney only. Please reach out to patient.Per patient, sometimes she has issues with her phone so might need to make 2 phone calls.     Action Taken: Other: Womens    Travel Screening: Not Applicable     Date of Service:

## 2024-08-22 NOTE — TELEPHONE ENCOUNTER
Writer spoke to patient regarding message below. Per patient, she cannot remember her LMP, but tested positive today. Patient states this was be her 6th pregnancy, and she has 3 children at home. Patient had 2 abortions (, and ).    Patient says is unsure if she wants this pregnancy and might want an . Writer informed patient that it is really important for patient to let clinic/provider know if she does not want this pregnancy/plans to terminate her pregnancy so that appropriate resouces could be given to patient.    Writer informed patient that if patient does plan to abort her pregnancy, for her to call the clinic and let provider know. Patient verbalizes understanding and has no further questions.    Assisted in scheduling RN OB Intake visit for:    Sep 12, 2024 11:00 AM  OB Intake with RACQUEL RN 1  [facility] [phone]     NANCY Conroy, RN   [facility]

## 2024-09-11 LAB
ABO/RH(D): NORMAL
ANTIBODY SCREEN: NEGATIVE
SPECIMEN EXPIRATION DATE: NORMAL

## 2024-09-12 ENCOUNTER — APPOINTMENT (OUTPATIENT)
Dept: LAB | Facility: CLINIC | Age: 29
End: 2024-09-12
Payer: MEDICAID

## 2024-09-12 ENCOUNTER — HOSPITAL ENCOUNTER (OUTPATIENT)
Dept: ULTRASOUND IMAGING | Facility: HOSPITAL | Age: 29
Discharge: HOME | End: 2024-09-12
Attending: FAMILY MEDICINE | Admitting: FAMILY MEDICINE
Payer: MEDICAID

## 2024-09-12 ENCOUNTER — PRENATAL OFFICE VISIT (OUTPATIENT)
Dept: FAMILY MEDICINE | Facility: CLINIC | Age: 29
End: 2024-09-12
Payer: MEDICAID

## 2024-09-12 ENCOUNTER — TELEPHONE (OUTPATIENT)
Dept: FAMILY MEDICINE | Facility: CLINIC | Age: 29
End: 2024-09-12

## 2024-09-12 VITALS
HEART RATE: 88 BPM | DIASTOLIC BLOOD PRESSURE: 73 MMHG | SYSTOLIC BLOOD PRESSURE: 115 MMHG | TEMPERATURE: 98.1 F | RESPIRATION RATE: 20 BRPM | OXYGEN SATURATION: 95 % | BODY MASS INDEX: 35.63 KG/M2 | HEIGHT: 60 IN | WEIGHT: 181.5 LBS

## 2024-09-12 DIAGNOSIS — O09.40 ENCOUNTER FOR SUPERVISION OF HIGH RISK PREGNANCY WITH GRAND MULTIPARITY, ANTEPARTUM: ICD-10-CM

## 2024-09-12 DIAGNOSIS — Z34.90 PREGNANCY, UNSPECIFIED GESTATIONAL AGE: ICD-10-CM

## 2024-09-12 DIAGNOSIS — O24.410 DIET CONTROLLED GESTATIONAL DIABETES MELLITUS (GDM), ANTEPARTUM: ICD-10-CM

## 2024-09-12 DIAGNOSIS — O09.40 ENCOUNTER FOR SUPERVISION OF HIGH RISK PREGNANCY WITH GRAND MULTIPARITY, ANTEPARTUM: Primary | ICD-10-CM

## 2024-09-12 DIAGNOSIS — Z11.3 SCREEN FOR STD (SEXUALLY TRANSMITTED DISEASE): ICD-10-CM

## 2024-09-12 DIAGNOSIS — E11.9 TYPE 2 DIABETES MELLITUS WITHOUT COMPLICATION, WITHOUT LONG-TERM CURRENT USE OF INSULIN (H): ICD-10-CM

## 2024-09-12 DIAGNOSIS — E11.9 TYPE 2 DIABETES MELLITUS WITHOUT COMPLICATION, WITHOUT LONG-TERM CURRENT USE OF INSULIN (H): Primary | ICD-10-CM

## 2024-09-12 LAB
ALBUMIN UR-MCNC: NEGATIVE MG/DL
APPEARANCE UR: CLEAR
BILIRUB UR QL STRIP: NEGATIVE
COLOR UR AUTO: YELLOW
ERYTHROCYTE [DISTWIDTH] IN BLOOD BY AUTOMATED COUNT: 13.3 % (ref 10–15)
GLUCOSE BLD-MCNC: 93 MG/DL (ref 60–99)
GLUCOSE UR STRIP-MCNC: NEGATIVE MG/DL
HBA1C MFR BLD: 5.7 % (ref 0–5.6)
HCT VFR BLD AUTO: 34.7 % (ref 35–47)
HCV AB SERPL QL IA: NONREACTIVE
HGB BLD-MCNC: 11.6 G/DL (ref 11.7–15.7)
HGB UR QL STRIP: NEGATIVE
HIV 1+2 AB+HIV1 P24 AG SERPL QL IA: NONREACTIVE
KETONES UR STRIP-MCNC: NEGATIVE MG/DL
LEUKOCYTE ESTERASE UR QL STRIP: NEGATIVE
MCH RBC QN AUTO: 29 PG (ref 26.5–33)
MCHC RBC AUTO-ENTMCNC: 33.4 G/DL (ref 31.5–36.5)
MCV RBC AUTO: 87 FL (ref 78–100)
NITRATE UR QL: NEGATIVE
PH UR STRIP: 7 [PH] (ref 5–7)
PLATELET # BLD AUTO: 244 10E3/UL (ref 150–450)
RBC # BLD AUTO: 4 10E6/UL (ref 3.8–5.2)
RUBV IGG SERPL QL IA: 5.98 INDEX
RUBV IGG SERPL QL IA: POSITIVE
SP GR UR STRIP: 1.02 (ref 1–1.03)
T PALLIDUM AB SER QL: NONREACTIVE
UROBILINOGEN UR STRIP-ACNC: 0.2 E.U./DL
WBC # BLD AUTO: 9.3 10E3/UL (ref 4–11)

## 2024-09-12 PROCEDURE — 82947 ASSAY GLUCOSE BLOOD QUANT: CPT

## 2024-09-12 PROCEDURE — 86900 BLOOD TYPING SEROLOGIC ABO: CPT

## 2024-09-12 PROCEDURE — 36415 COLL VENOUS BLD VENIPUNCTURE: CPT

## 2024-09-12 PROCEDURE — 99207 PR NO CHARGE NURSE ONLY: CPT

## 2024-09-12 PROCEDURE — 86803 HEPATITIS C AB TEST: CPT

## 2024-09-12 PROCEDURE — 86780 TREPONEMA PALLIDUM: CPT

## 2024-09-12 PROCEDURE — 76805 OB US >/= 14 WKS SNGL FETUS: CPT

## 2024-09-12 PROCEDURE — 81003 URINALYSIS AUTO W/O SCOPE: CPT

## 2024-09-12 PROCEDURE — 83036 HEMOGLOBIN GLYCOSYLATED A1C: CPT

## 2024-09-12 PROCEDURE — 86901 BLOOD TYPING SEROLOGIC RH(D): CPT

## 2024-09-12 PROCEDURE — 87340 HEPATITIS B SURFACE AG IA: CPT

## 2024-09-12 PROCEDURE — 86762 RUBELLA ANTIBODY: CPT

## 2024-09-12 PROCEDURE — 87086 URINE CULTURE/COLONY COUNT: CPT

## 2024-09-12 PROCEDURE — 87389 HIV-1 AG W/HIV-1&-2 AB AG IA: CPT

## 2024-09-12 PROCEDURE — 86850 RBC ANTIBODY SCREEN: CPT

## 2024-09-12 PROCEDURE — 87491 CHLMYD TRACH DNA AMP PROBE: CPT

## 2024-09-12 PROCEDURE — 85027 COMPLETE CBC AUTOMATED: CPT

## 2024-09-12 PROCEDURE — 87591 N.GONORRHOEAE DNA AMP PROB: CPT

## 2024-09-12 RX ORDER — LANCETS
EACH MISCELLANEOUS
Qty: 200 EACH | Refills: 6 | Status: SHIPPED | OUTPATIENT
Start: 2024-09-12

## 2024-09-12 ASSESSMENT — PAIN SCALES - GENERAL: PAINLEVEL: MILD PAIN (2)

## 2024-09-12 NOTE — PROGRESS NOTES
SUBJECTIVE:     HPI:    This is a 29 year old female patient,   who presents for her first obstetrical RN OB intake.   not required during this visit.    Unsure last menstrual period but think sometime in .  She is Unknown weeks.  Her cycles are irregular.  Her last menstrual period was normal.   Since her LMP, she has experienced  some mild nausea without vomiting, mid back pain, mild headache manageable without intervention, occasional constipation and feeling fatigue ).   She denies emesis, abdominal pain, loss of appetite, vaginal discharge, dysuria, pelvic pain, urinary urgency, urinary frequency, vaginal bleeding, and hemorrhoids.    Additional History: , + 2 girls +1 boy. Gestational diabetes with diet control with previous pregnancy, two miscarriages in between pregnancies and bleeding during pregnancy. Patient phoned in to speak to triage nurse regarding unsure if desire to keep pregnancy.  During intake today, patient verbalized acceptance to pregnancy with welcoming.  Patient reported trauma with medications in the past.  Not receptive to medications during pregnancy. However, open to gummy form of medication(s) recommended from provider.   Patient requesting breastfeeding/lactation teaching with this pregnancy.  Failed to successfully  last baby due no previous education and or experience.     Have you travelled during the pregnancy?Yes, If yes, where? Drove to [place] to visit families with  and the children in .       HISTORY:   Planned Pregnancy: No  Marital Status:   Occupation: Patient not employ.   Living in Household: Spouse and Children and Parents/Siblings    Past History:  Her past medical history   Past Medical History:   Diagnosis Date    Asthma     H/O: iron deficiency anemia     as a young child    Miscarriage 10/1/2019    Overweight    .      She has a history of  gestational diabetes, diet controlled and spontaneous  "abortions.     Since her last LMP she denies use of alcohol, tobacco and street drugs.    Past medical, surgical, social and family history were reviewed and updated in EPIC.       Current Outpatient Medications   Medication Sig Dispense Refill    albuterol (PROAIR HFA/PROVENTIL HFA/VENTOLIN HFA) 108 (90 Base) MCG/ACT inhaler Inhale 2 puffs into the lungs every 6 hours as needed for shortness of breath, wheezing or cough 18 g 6     No current facility-administered medications for this visit.        OBJECTIVE:     EXAM:  /73   Pulse 88   Temp 98.1  F (36.7  C) (Oral)   Resp 20   Ht 1.514 m (4' 11.61\")   Wt 82.3 kg (181 lb 8 oz)   LMP  (LMP Unknown)   SpO2 95%   BMI 35.92 kg/m   Body mass index is 35.92 kg/m .    - prepregnancy weight: 175 lb  - patient physically appeared pregnant through visual observation  - patient reported fetal movement felt       ASSESSMENT/PLAN:       ICD-10-CM    1. Encounter for supervision of high risk pregnancy with grand multiparity, antepartum  O09.40 ABO/Rh type and screen     Hepatitis B surface antigen     CBC with platelets     HIV Antigen Antibody Combo     Rubella Antibody IgG     Treponema Abs w Reflex to RPR and Titer     Urine Culture Aerobic Bacterial     *UA reflex to Microscopic     US OB < 14 weeks, single,  for dating ([id number])     Chlamydia trachomatis/Neisseria gonorrhoeae by PCR - Clinic Collect     Hepatitis C Screen Reflex to HCV RNA Quant and Genotype      2. Diet controlled gestational diabetes mellitus (GDM), antepartum  O24.410 Hemoglobin A1c      3. Screen for STD (sexually transmitted disease)  Z11.3 HIV Antigen Antibody Combo     Treponema Abs w Reflex to RPR and Titer     Chlamydia trachomatis/Neisseria gonorrhoeae by PCR - Clinic Collect          29 year old .  Unknown weeks of pregnancy. Reported LMP sometime in .     Discussed as follows:     - Hemoglobin A1C  - Lead Venous Blood  - Urine Macroscopic with reflex to micro and " culture  - Hep C screening reflex w/ HCV RNA quant and genotype  - Treponema Abs W Reflex TO RPR and Titer  - Rubella Antibody IGG  - HIV Antigen Antibody Combo  - CBC with Platelets  - Hepatitis B Surface Antigen  - ABO/RH Type and Scsreen  - Chlamydia Trachomatis/Neisseria Gonorrhea vaginal swab  - US < 14 weeks single dating - procedure scheduled 9/12/24  - MYRIAD testing not discussed due to unknown gestational     Huddled with Dr. Barney regarding fasting glucose related to hx of GDM per patient request and prenatal vitamin.  Provider recommendations:  take OTC prenatal vitamin gummy as desire as insurance might not cover form of medication.  Patient declined regular form of tablet prenatal due to size of tab    New glucometer kit sent to pharmacy( pharmacy provided) to check glucose QID for now (before breakfast, lunch, dinner and bed). Keep record and bring to next appointment with Dr. Barney.   Diabetes education referral placed.    Patient requesting  consult.  Secure chat with social for consult     Counseling given:   - IOB with Dr. Barney scheduled for 9/30.    - Return in 2-4 weeks for OB visit or more frequently based on provider recommendation.     New Prenatal Education  during nurse intake    Medications- Prenatal Vitamin, recommend one with DHA as this helps with development of eyes and brain, no specific brand recommendation   Water Intake-  ounces daily   Weight- monitored at each appointment   Common Symptoms- may experience shortness of breath, increased heart rate, nausea/vomiting, headaches, cramping, spotting, etc.  If symptoms not improved and or worsening, contact provider.        - Only take Tylenol (acetaminophen) for headaches/pain concerns   - Review remedies & OTC medication to help with common symptoms in pregnancy (see taking medication during pregnancy handout)     [place]- baby is protected, not uncommon to have light cramping or spotting, reach out if  persisting or worsening    Diet   - Wash fruits and vegetables before eating raw or cooking   - Avoid unpasteurized products   - Avoid undercooked meat, poultry, fish (no raw fish) and eggs    - Reheat hot dogs and luncheon meats/cold cuts prior to consumption     Caffeine- limit to 200 mg/day (about 1.5 cups of coffee)   Abstain from smoking, alcohol, and drugs      Travel     - No travel 4-6 weeks out from due date   - Planes/lengthy drives- get up and walk every 1-2 hours for circulation, increased risk for DVT during pregnancy   - Seat belts- wear underneath belly not across it   - Air bags- back up seat      Disease and Infection     Risk of toxoplasmosis with cats  feces, have someone else change litter box during pregnancy, wear gloves when working outside and wash hands thoroughly   Avoid traveling to locations high risk for zika, use insect repellent, wear long sleeves and pants   Flu vaccine during flu season, COVID vaccine and TDAP   TDAP recommended with each pregnancy, make sure partner is up to date as well (usually only once every 10 years)      Frequency of Appointments- unless advised otherwise   Every 4 weeks until 28 weeks   Every 2 weeks until 36 weeks   Weekly until delivery         Prenatal OB Questionnaire     Patient supplied answers from flow sheet for:  Prenatal OB Questionnaire.  Past Medical History  Have you ever recieved care for your mental health? : No  Have you ever been in a major accident or suffered serious trauma?: No  Within the last year, has anyone hit, slapped, kicked or otherwise hurt you?: No  In the last year, has anyone forced you to have sex when you didn't want to?: No    Past Medical History 2   Have you ever received a blood transfusion?: No  Would you accept a blood transfusion if was medically recommended?: Yes  Does anyone in your home smoke?: No   Is your blood type Rh negative?: Unknown  Have you ever ?: No  Have you been hospitalized for a nonsurgical  reason excluding normal delivery?: No  Have you ever had an abnormal pap smear?: (!) Yes    Past Medical History (Continued)  Do you have a history of abnormalities of the uterus?: No  Did your mother take RADHA or any other hormones when she was pregnant with you?: No  Do you have any other problems we have not asked about which you feel may be important to this pregnancy?: No      Allergies as of 9/12/2024:    Allergies as of 09/12/2024    (No Known Allergies)       Current medications are:  Current Outpatient Medications   Medication Sig Dispense Refill    albuterol (PROAIR HFA/PROVENTIL HFA/VENTOLIN HFA) 108 (90 Base) MCG/ACT inhaler Inhale 2 puffs into the lungs every 6 hours as needed for shortness of breath, wheezing or cough 18 g 6         Early ultrasound screening tool:    Does patient have irregular periods?  N/A  Did patient use hormonal birth control in the three months prior to positive urine pregnancy test? Yes  Patient on Depo last injection on record 8/23/23. Patient reported on patch after depo. Off three months prior to pregnancy per patient report.   Is the patient breastfeeding?   Breast fed one of the child for two 2 weeks  Is the patient 10 weeks or greater at time of education visit?  N/A

## 2024-09-12 NOTE — TELEPHONE ENCOUNTER
Called patient to get a updated LMP.  Patient unsure as she was on contraceptive prior to pregnancy.  Patient reported can feel fetal movement. Therefore, patient maybe farther along pregnant than expected.  Patient plans to come to RN OB appt as scheduled.    Miguel Ramos RN  [facility]

## 2024-09-13 ENCOUNTER — PATIENT OUTREACH (OUTPATIENT)
Dept: CARE COORDINATION | Facility: CLINIC | Age: 29
End: 2024-09-13
Payer: MEDICAID

## 2024-09-13 DIAGNOSIS — Z71.89 OTHER SPECIFIED COUNSELING: Primary | Chronic | ICD-10-CM

## 2024-09-13 LAB
BACTERIA UR CULT: NORMAL
C TRACH DNA SPEC QL PROBE+SIG AMP: NEGATIVE
HBV SURFACE AG SERPL QL IA: NONREACTIVE
N GONORRHOEA DNA SPEC QL NAA+PROBE: NEGATIVE

## 2024-09-16 ENCOUNTER — PATIENT OUTREACH (OUTPATIENT)
Dept: CARE COORDINATION | Facility: CLINIC | Age: 29
End: 2024-09-16
Payer: MEDICAID

## 2024-09-18 ENCOUNTER — VIRTUAL VISIT (OUTPATIENT)
Dept: EDUCATION SERVICES | Facility: CLINIC | Age: 29
End: 2024-09-18
Attending: FAMILY MEDICINE
Payer: MEDICAID

## 2024-09-18 DIAGNOSIS — O24.312 PRE-EXISTING DIABETES MELLITUS AFFECTING PREGNANCY IN SECOND TRIMESTER, ANTEPARTUM: Primary | ICD-10-CM

## 2024-09-18 DIAGNOSIS — E11.9 TYPE 2 DIABETES MELLITUS WITHOUT COMPLICATION, WITHOUT LONG-TERM CURRENT USE OF INSULIN (H): ICD-10-CM

## 2024-09-18 PROCEDURE — G0108 DIAB MANAGE TRN  PER INDIV: HCPCS | Mod: 95

## 2024-09-18 RX ORDER — BLOOD-GLUCOSE SENSOR
EACH MISCELLANEOUS
Qty: 6 EACH | Refills: 1 | Status: SHIPPED | OUTPATIENT
Start: 2024-09-18

## 2024-09-18 ASSESSMENT — PAIN SCALES - GENERAL: PAINLEVEL: NO PAIN (0)

## 2024-09-18 NOTE — NURSING NOTE
Current patient location: Patient declined to provide     Is the patient currently in the state of [place]? YES    Visit mode:VIDEO    If the visit is dropped, the patient can be reconnected by: VIDEO VISIT: Text to cell phone:   Telephone Information:   Mobile [phone]       Will anyone else be joining the visit? NO  (If patient encounters technical issues they should call [phone]    How would you like to obtain your AVS? MyChart    Are changes needed to the allergy or medication list? No    Are refills needed on medications prescribed by this physician? NO    Rooming Documentation:  Questionnaire(s) not done per department protocol      Reason for visit: Consult    Shelby Kocher VVF

## 2024-09-18 NOTE — PATIENT INSTRUCTIONS
PLAN:  *Start Jamshid 3 plus sensor (download Jamshid 3 Miriam)  *If unable to start sensor, check blood sugar in the morning when you wake up and 1 hour after meals  *GDM instructions    FOLLOW-UP:  9/26 by telephone at 2pm with Gale

## 2024-09-18 NOTE — LETTER
"9/18/2024      Fan Duran  [address] [place] [address]      Dear Colleague,    Thank you for referring your patient, Fan Duran, to the [facility]. Please see a copy of my visit note below.    Virtual Visit Details    Type of service:  Video Visit   Video Start Time: 1:13 PM  Video End Time:1:53 PM    Originating Location (pt. Location): Other Car    Distant Location (provider location):  Off-site  Platform used for Video Visit: Florentin    Diabetes Self-Management Training - Pregnancy Complicated by Diabetes    SUBJECTIVE:  Fan Duran presents today for  education related to Pregnancy complicated by diabetes  She is accompanied by self  Patient concerns: is concerned about \"having to inject belly,\" when to check blood sugars    LMP  (LMP Unknown)     Lab Results   Component Value Date    GLC 94 01/12/2024    GLC 89 08/11/2022    GLC 77 02/16/2022       History   Smoking Status     Never   Smokeless Tobacco     Never       SOCIO/ECONOMIC HISTORY:  Lives with: spouse and 3 children  Recent family changes/social stressors: none noted  Language(s) spoken at home: English    ASSESSMENT:  Due date 1/8/2025 (ultrasound) Previous pregnancies? Yes  (# full term pregnancies 3 )  Problems in past pregnancy: pre-diabetes in last pregnancy  Profession-stay at home  Prepregnancy weight 175#  Height: 4'11\"  Current weight 181#    Medical conditions: None   Medications: None   Past hospitalizations None  Special dietary considerations:None  Exercise habits: None    Problems in current pregnancy: T2DM (A1C=6.6 1/2024)    NUTRITION:  Breakfast: Coffee  Lunch: Rice, noodles, vegetables  Dinner: Skips 2-3 times per week; Rice, noodles   Overnight: Chicken, rice, pizza  Water throughout day    Educational topics covered today:  Pathophysiology of diabetes in pregnancy, Risks and Complications, Blood Glucose Goals, Logging and Interpreting Glucose Results,When to Call a Diabetes " Educator, Endocrinologist or OB Provider, CHO goals and eating consistently, CGM    ASSESSMENT  Pre-existing T2DM seen for comprehensive review at the request of Dr. Barney. Most recent A1C=5.7. She reports GDM with last pregnancy, no insulin. She has been trying to manage with diet. If she's not hungry, she will skip meals or other days will eat small portions every few hours. Diet primarily consists of rice, noodles, and veggies. She does not check glucose; plans to  meter today. Discussed benefit of CGM; patient agreeable. Jamshid 3 plus ordered today. Cousin wears one and will help her apply and set up  Jamshid 3 Miriam. Follow up  by telephone at 2pm    PLAN:  *Start Jamshid 3 plus sensor (download Jamshid 3 Miriam)  *If unable to start sensor, check blood sugar in the morning when you wake up and 1 hour after meals  *GDM instructions    FOLLOW-UP:   by telephone at 2pm with Gale     Time spent was 33 minutes  Encounter type: Individual    Gale Murray RN, [facility]  Diabetes Education Department  [facility]  Phone: [phone]  [phone]    Any diabetes medication dose changes were made via the CDE Protocol and Collaborative Practice Agreement with [facility].  A copy of this encounter was provided to patient's referring provider.      Again, thank you for allowing me to participate in the care of your patient.        Sincerely,        Gale Murray RN

## 2024-09-18 NOTE — PROGRESS NOTES
"Virtual Visit Details    Type of service:  Video Visit   Video Start Time: 1:13 PM  Video End Time:1:53 PM    Originating Location (pt. Location): Other Car    Distant Location (provider location):  Off-site  Platform used for Video Visit: Florentin    Diabetes Self-Management Training - Pregnancy Complicated by Diabetes    SUBJECTIVE:  Fan Duran presents today for  education related to Pregnancy complicated by diabetes  She is accompanied by self  Patient concerns: is concerned about \"having to inject belly,\" when to check blood sugars    LMP  (LMP Unknown)     Lab Results   Component Value Date    GLC 94 01/12/2024    GLC 89 08/11/2022    GLC 77 02/16/2022       History   Smoking Status    Never   Smokeless Tobacco    Never       SOCIO/ECONOMIC HISTORY:  Lives with: spouse and 3 children  Recent family changes/social stressors: none noted  Language(s) spoken at home: English    ASSESSMENT:  Due date 1/8/2025 (ultrasound) Previous pregnancies? Yes  (# full term pregnancies 3 )  Problems in past pregnancy: pre-diabetes in last pregnancy  Profession-stay at home  Prepregnancy weight 175#  Height: 4'11\"  Current weight 181#    Medical conditions: None   Medications: None   Past hospitalizations None  Special dietary considerations:None  Exercise habits: None    Problems in current pregnancy: T2DM (A1C=6.6 1/2024)    NUTRITION:  Breakfast: Coffee  Lunch: Rice, noodles, vegetables  Dinner: Skips 2-3 times per week; Rice, noodles   Overnight: Chicken, rice, pizza  Water throughout day    Educational topics covered today:  Pathophysiology of diabetes in pregnancy, Risks and Complications, Blood Glucose Goals, Logging and Interpreting Glucose Results,When to Call a Diabetes Educator, Endocrinologist or OB Provider, CHO goals and eating consistently, CGM    ASSESSMENT  Pre-existing T2DM seen for comprehensive review at the request of Dr. Barney. Most recent A1C=5.7. She reports GDM with last pregnancy, no insulin. " She has been trying to manage with diet. If she's not hungry, she will skip meals or other days will eat small portions every few hours. Diet primarily consists of rice, noodles, and veggies. She does not check glucose; plans to  meter today. Discussed benefit of CGM; patient agreeable. Jamshid 3 plus ordered today. Cousin wears one and will help her apply and set up  Jamshid 3 Miriam. Follow up  by telephone at 2pm    PLAN:  *Start Jamshid 3 plus sensor (download Jamshid 3 Miriam)  *If unable to start sensor, check blood sugar in the morning when you wake up and 1 hour after meals  *GDM instructions    FOLLOW-UP:   by telephone at 2pm with Gale     Time spent was 33 minutes  Encounter type: Individual    Gale Murray RN, [facility]  Diabetes Education Department  [facility]  Phone: [phone]  [phone]    Any diabetes medication dose changes were made via the CDE Protocol and Collaborative Practice Agreement with [facility].  A copy of this encounter was provided to patient's referring provider.

## 2024-09-19 ENCOUNTER — TRANSFERRED RECORDS (OUTPATIENT)
Dept: MULTI SPECIALTY CLINIC | Facility: CLINIC | Age: 29
End: 2024-09-19
Payer: MEDICAID

## 2024-09-19 LAB — RETINOPATHY: NORMAL

## 2024-09-25 ENCOUNTER — TELEPHONE (OUTPATIENT)
Dept: FAMILY MEDICINE | Facility: CLINIC | Age: 29
End: 2024-09-25
Payer: MEDICAID

## 2024-09-25 NOTE — TELEPHONE ENCOUNTER
General Call      Reason for Visit:  Patient came to clinic to update provider her testing supplies were not able to  due to insurance being inactive.  Assistance from  were able to retrieve patient's coverage from [facility] and information provided to pharmacy.  Coverage went through.  Patient can stop by to  diabetic testing supplies.    Patient notified of status in person.    Miguel Ramos RN  [facility]

## 2024-09-26 ENCOUNTER — VIRTUAL VISIT (OUTPATIENT)
Dept: EDUCATION SERVICES | Facility: CLINIC | Age: 29
End: 2024-09-26
Payer: MEDICAID

## 2024-09-26 DIAGNOSIS — O24.312 PRE-EXISTING DIABETES MELLITUS AFFECTING PREGNANCY IN SECOND TRIMESTER, ANTEPARTUM: Primary | ICD-10-CM

## 2024-09-26 PROCEDURE — 99207 PR NO BILLABLE SERVICE THIS VISIT: CPT | Mod: 93

## 2024-09-26 NOTE — LETTER
2024      Fan Duran  [address] [place] [address]      Dear Colleague,    Thank you for referring your patient, Fan Duran, to the [facility]. Please see a copy of my visit note below.    Left message for patient to return call for telephone visit if by 2:15pm.    Second attempt to reach patient made at 2:06pm.     No Charge.    Gale Murray RN, [facility]  Diabetes Education Department  [facility]  Phone: [phone]  [phone]      Again, thank you for allowing me to participate in the care of your patient.        Sincerely,        Gale Murray RN

## 2024-09-26 NOTE — PROGRESS NOTES
Left message for patient to return call for telephone visit if by 2:15pm.    Second attempt to reach patient made at 2:06pm.     No Charge.    Gale Murray RN, [facility]  Diabetes Education Department  [facility], [place]  Phone: [phone]  [phone]

## 2024-09-30 ENCOUNTER — OFFICE VISIT (OUTPATIENT)
Dept: FAMILY MEDICINE | Facility: CLINIC | Age: 29
End: 2024-09-30
Attending: FAMILY MEDICINE
Payer: MEDICAID

## 2024-09-30 VITALS
WEIGHT: 186.5 LBS | HEIGHT: 60 IN | TEMPERATURE: 99 F | OXYGEN SATURATION: 99 % | DIASTOLIC BLOOD PRESSURE: 66 MMHG | RESPIRATION RATE: 16 BRPM | HEART RATE: 93 BPM | BODY MASS INDEX: 36.61 KG/M2 | SYSTOLIC BLOOD PRESSURE: 106 MMHG

## 2024-09-30 DIAGNOSIS — E11.9 TYPE 2 DIABETES MELLITUS WITHOUT COMPLICATION, WITHOUT LONG-TERM CURRENT USE OF INSULIN (H): ICD-10-CM

## 2024-09-30 DIAGNOSIS — Z34.90 PREGNANCY, UNSPECIFIED GESTATIONAL AGE: Primary | ICD-10-CM

## 2024-09-30 DIAGNOSIS — O09.30 LIMITED PRENATAL CARE, ANTEPARTUM: ICD-10-CM

## 2024-09-30 DIAGNOSIS — E66.811 CLASS 1 OBESITY WITHOUT SERIOUS COMORBIDITY IN ADULT, UNSPECIFIED BMI, UNSPECIFIED OBESITY TYPE: ICD-10-CM

## 2024-09-30 LAB
CREAT UR-MCNC: 49.5 MG/DL
MICROALBUMIN UR-MCNC: <12 MG/L
MICROALBUMIN/CREAT UR: NORMAL MG/G{CREAT}

## 2024-09-30 PROCEDURE — 90471 IMMUNIZATION ADMIN: CPT | Performed by: FAMILY MEDICINE

## 2024-09-30 PROCEDURE — 99207 PR PRENATAL VISIT: CPT | Performed by: FAMILY MEDICINE

## 2024-09-30 PROCEDURE — 82570 ASSAY OF URINE CREATININE: CPT | Performed by: FAMILY MEDICINE

## 2024-09-30 PROCEDURE — 82043 UR ALBUMIN QUANTITATIVE: CPT | Performed by: FAMILY MEDICINE

## 2024-09-30 PROCEDURE — 90656 IIV3 VACC NO PRSV 0.5 ML IM: CPT | Performed by: FAMILY MEDICINE

## 2024-09-30 RX ORDER — PNV NO.95/FERROUS FUM/FOLIC AC 28MG-0.8MG
1 TABLET ORAL DAILY
Qty: 90 TABLET | Refills: 3 | Status: CANCELLED | OUTPATIENT
Start: 2024-09-30

## 2024-09-30 RX ORDER — ASPIRIN 81 MG/1
81 TABLET ORAL DAILY
Qty: 90 TABLET | Refills: 1 | Status: SHIPPED | OUTPATIENT
Start: 2024-09-30

## 2024-09-30 ASSESSMENT — ASTHMA QUESTIONNAIRES
QUESTION_1 LAST FOUR WEEKS HOW MUCH OF THE TIME DID YOUR ASTHMA KEEP YOU FROM GETTING AS MUCH DONE AT WORK, SCHOOL OR AT HOME: A LITTLE OF THE TIME
ACT_TOTALSCORE: 19
QUESTION_5 LAST FOUR WEEKS HOW WOULD YOU RATE YOUR ASTHMA CONTROL: WELL CONTROLLED
QUESTION_2 LAST FOUR WEEKS HOW OFTEN HAVE YOU HAD SHORTNESS OF BREATH: ONCE OR TWICE A WEEK
ACT_TOTALSCORE: 19
QUESTION_3 LAST FOUR WEEKS HOW OFTEN DID YOUR ASTHMA SYMPTOMS (WHEEZING, COUGHING, SHORTNESS OF BREATH, CHEST TIGHTNESS OR PAIN) WAKE YOU UP AT NIGHT OR EARLIER THAN USUAL IN THE MORNING: ONCE OR TWICE
QUESTION_4 LAST FOUR WEEKS HOW OFTEN HAVE YOU USED YOUR RESCUE INHALER OR NEBULIZER MEDICATION (SUCH AS ALBUTEROL): TWO OR THREE TIMES PER WEEK

## 2024-09-30 NOTE — PROGRESS NOTES
"SUBJECTIVE:   at 25w5d by 23 week US. Estimated Date of Delivery: 2025.  She is doing well. She denies nausea and vomiting. She denies abdominal pain/cramping, vaginal bleeding, leakage of fluid. Fetal movement is present.   Concerns: Late to prenatal care- she was uncertain about continuing pregnancy previously. Was unable to get the CGM through insurance but has her glucose meter from last pregnancy. Last week has checked her sugars. 1 hour post-prandial - all at goal. Fasting - mildly elevated. She missed appointment with CDE. Did not tolerate PNV- plans to get gummy ones OTC.  ROS  Negative except as noted above in HPI.  OBJECTIVE:  Blood pressure 106/66, pulse 93, temperature 99  F (37.2  C), temperature source Oral, resp. rate 16, height 1.514 m (4' 11.61\"), weight 84.6 kg (186 lb 8 oz), SpO2 99%, not currently breastfeeding.  Gen: comfortable, no acute distress.  See OB Vitals flowsheet.  ASSESSMENT/PLAN:  Fan was seen today for prenatal care.    Diagnoses and all orders for this visit:    Pregnancy, unspecified gestational age  -     blood glucose (NO BRAND SPECIFIED) lancets standard; Use to test blood sugar 4 times daily or as directed.    Type 2 diabetes mellitus without complication, without long-term current use of insulin (H): A1C did meet diagnosis for diabetes after her last pregnancy however she is not on medication, and her A1C was 5.7, suggestive of pre-diabetes currently. However, will still treat this as pre-existing type 2 DM in pregnancy. Start daily ASA for preE prophylaxis. Refer to [facility] for growth ultrasounds and  surveillance- will also need fetal echocardiogram. Continue checking sugars four times daily. She is aware of dietary and exercise modifications- working with CDE team- will likely need to add insulin based on elevated fastings. Gave her # to call to reschedule her appointment.  -     aspirin 81 MG EC tablet; Take 1 tablet (81 mg) by mouth " daily.  -     [facility] Referral - Pregnancy; Future  -     blood glucose (NO BRAND SPECIFIED) lancets standard; Use to test blood sugar 4 times daily or as directed.  -     Albumin Random Urine Quantitative with Creat Ratio; Future  -     Albumin Random Urine Quantitative with Creat Ratio    Class 1 obesity without serious comorbidity in adult, unspecified BMI, unspecified obesity type: Pre-pregnancy BMI 34.   -     [facility] Referral - Pregnancy; Future    Limited prenatal care, antepartum    Other orders  -     PRIMARY CARE FOLLOW-UP SCHEDULING  -     INFLUENZA VACCINE, SPLIT VIRUS, TRIVALENT,PF (FLUZONE\FLUARIX)      -IUP at 25w5d:   Prenatal labs reviewed   Peripartum Anesthesia: the patient DOES desire an epidural during labor.   Post-partum Contraception: need to discuss, wants this to be her last baby  Breast/Bottle: breast, formula- she wanted to breastfeed longer than 2 weeks with her last baby- interested in lactation support in hospital. Reviewed importance of exclusive breastfeeding in the first few weeks to establish supply.   RTC in 2 weeks or sooner with problems. Hemoglobin, RPR next visit        Sully Barney MD

## 2024-10-01 DIAGNOSIS — O24.112 TYPE 2 DIABETES MELLITUS IN PREGNANCY, SECOND TRIMESTER: Primary | ICD-10-CM

## 2024-10-10 ENCOUNTER — TELEPHONE (OUTPATIENT)
Dept: FAMILY MEDICINE | Facility: CLINIC | Age: 29
End: 2024-10-10

## 2024-10-10 ENCOUNTER — VIRTUAL VISIT (OUTPATIENT)
Dept: EDUCATION SERVICES | Facility: CLINIC | Age: 29
End: 2024-10-10
Payer: COMMERCIAL

## 2024-10-10 DIAGNOSIS — O24.313 PRE-EXISTING DIABETES MELLITUS AFFECTING PREGNANCY IN THIRD TRIMESTER, ANTEPARTUM: ICD-10-CM

## 2024-10-10 DIAGNOSIS — Z34.90 PREGNANCY, UNSPECIFIED GESTATIONAL AGE: Primary | ICD-10-CM

## 2024-10-10 DIAGNOSIS — O24.312 PRE-EXISTING DIABETES MELLITUS AFFECTING PREGNANCY IN SECOND TRIMESTER, ANTEPARTUM: Primary | ICD-10-CM

## 2024-10-10 PROCEDURE — G0108 DIAB MANAGE TRN  PER INDIV: HCPCS | Mod: AE

## 2024-10-10 RX ORDER — PEN NEEDLE, DIABETIC 32GX 5/32"
NEEDLE, DISPOSABLE MISCELLANEOUS
Qty: 50 EACH | Refills: 1 | Status: SHIPPED | OUTPATIENT
Start: 2024-10-10

## 2024-10-10 RX ORDER — HUMAN INSULIN 100 [IU]/ML
6 INJECTION, SUSPENSION SUBCUTANEOUS AT BEDTIME
Qty: 15 ML | Refills: 0 | Status: SHIPPED | OUTPATIENT
Start: 2024-10-10 | End: 2024-10-30

## 2024-10-10 NOTE — TELEPHONE ENCOUNTER
Retail Pharmacy Prior Authorization Team   Phone: [phone]    PRIOR AUTHORIZATION DENIED    Medication: NOVOLIN N FLEXPEN 100 UNIT/ML SC SUPN  Insurance Company: [employer] ([facility]) - Phone [phone] Fax [phone]  Denial Date: 10/10/2024  Denial Reason(s): MUST TRY/FAIL NOVOLIN N VIAL, NOVOLIN N RELION VIAL, OR HUMULIN N VIAL      Appeal Information: IF THE PROVIDER WOULD LIKE TO APPEAL THIS DECISION PLEASE PROVIDE THE PA TEAM WITH A LETTER OF MEDICAL NECESSITY      Patient Notified: NO

## 2024-10-10 NOTE — PATIENT INSTRUCTIONS
PLAN:  *Start NPH insulin 6 units at bedtime (10-11pm)  *Check glucose fasting in the morning and one hour after meals  *If you have bedtime snack, eat a small carb (one small apple, 1/2 banana) with protein    FOLLOW-UP:  *10/17 Video Visit with Gale at 9am

## 2024-10-10 NOTE — PROGRESS NOTES
"Virtual Visit Details    Type of service:  Video Visit   Video Start Time: 7:59 AM  Video End Time:8:43 AM    Originating Location (pt. Location): Home    Distant Location (provider location):  Off-site  Platform used for Video Visit: Florentin    Diabetes Self-Management Training - Pregnancy Complicated by Diabetes    SUBJECTIVE:  Fan Duran presents today for  education related to Pregnancy complicated by diabetes  She is accompanied by self  Patient concerns: is concerned about fasting morning glucose numbers    LMP  (LMP Unknown)     Lab Results   Component Value Date    GLC 94 01/12/2024    GLC 89 08/11/2022    GLC 77 02/16/2022       History   Smoking Status    Never   Smokeless Tobacco    Never       SOCIO/ECONOMIC HISTORY:  Lives with: spouse and 3 children  Recent family changes/social stressors: none noted  Language(s) spoken at home: English    ASSESSMENT:  Due date 1/8/2025 (ultrasound) Previous pregnancies? Yes  (# full term pregnancies 3 )  Problems in past pregnancy: pre-diabetes in last pregnancy  Profession-stay at home  Prepregnancy weight 175#  Height: 4'11\"  Current weight 186#    Medical conditions: None   Medications: None   Past hospitalizations None  Special dietary considerations:None  Exercise habits: None    Problems in current pregnancy: T2DM (A1C=6.6 1/2024)    NUTRITION:  Breakfast: Coffee  Lunch: Rice, noodles, vegetables  Dinner: Skips 2-3 times per week; Rice, noodles, salad with egg   Overnight: Chicken, rice, pizza  Bedtime snack-12am: Banana, apple  Water throughout day    GLUCOSE: Fasting in morning and 1 hour after meals    Date Pre-B Post-B Pre-L Post-L Pre-D Post-D  HS/NOC   10/3 123 104  113  97    10/4 114 140  114  96    10/5 120   123  144    10/6 97 115  127  92    10/7 105 122  119  130    10/8 125 114  117  122    10/9 104 123  117  125    10/10         101    Educational topics covered today:  Insulin resistance, NPH MOA, pen, mixing, dosing, injection technique, " disposal, hypoglycemia symptoms and treatment    ASSESSMENT  Pre-existing T2DM seen in pregnancy to review glucose.She is currently 27 weeks, 1 day gestation. Overall, she is feeling well; emotional. She continues to eat every few hours, small portion sizes (fist size). She has been eating 2 pieces of fruit or salad with egg if hungry before bed a few times per week. Morning fasting 0% at goal, post meal numbers at goal. Unfortunately, there was issue with insurance and she was not able to get sensor. She is agreeable to start NPH 6 units at bedtime. Follow up in one week.     PLAN:  *Start NPH insulin 6 units at bedtime (10-11pm)  *Check glucose fasting in the morning and one hour after meals  *If you have bedtime snack, eat a small carb (one small apple, 1/2 banana) with protein    FOLLOW-UP:  *10/17 Video Visit with Gale at 9am    Time spent was 43 minutes  Encounter type: Individual    Gale Murray RN, [facility]  Diabetes Education Department  [facility], [place]  Phone: [phone]  [phone]    Any diabetes medication dose changes were made via the CDE Protocol and Collaborative Practice Agreement with [facility].  A copy of this encounter was provided to patient's referring provider-Ector

## 2024-10-10 NOTE — LETTER
"10/10/2024      Fan Duran  [address] [place] [address]      Dear Colleague,    Thank you for referring your patient, Fan Duran, to the [facility]. Please see a copy of my visit note below.    Virtual Visit Details    Type of service:  Video Visit   Video Start Time: 7:59 AM  Video End Time:8:43 AM    Originating Location (pt. Location): Home    Distant Location (provider location):  Off-site  Platform used for Video Visit: Florentin    Diabetes Self-Management Training - Pregnancy Complicated by Diabetes    SUBJECTIVE:  Fan Duran presents today for  education related to Pregnancy complicated by diabetes  She is accompanied by self  Patient concerns: is concerned about fasting morning glucose numbers    LMP  (LMP Unknown)     Lab Results   Component Value Date    GLC 94 01/12/2024    GLC 89 08/11/2022    GLC 77 02/16/2022       History   Smoking Status     Never   Smokeless Tobacco     Never       SOCIO/ECONOMIC HISTORY:  Lives with: spouse and 3 children  Recent family changes/social stressors: none noted  Language(s) spoken at home: English    ASSESSMENT:  Due date 1/8/2025 (ultrasound) Previous pregnancies? Yes  (# full term pregnancies 3 )  Problems in past pregnancy: pre-diabetes in last pregnancy  Profession-stay at home  Prepregnancy weight 175#  Height: 4'11\"  Current weight 186#    Medical conditions: None   Medications: None   Past hospitalizations None  Special dietary considerations:None  Exercise habits: None    Problems in current pregnancy: T2DM (A1C=6.6 1/2024)    NUTRITION:  Breakfast: Coffee  Lunch: Rice, noodles, vegetables  Dinner: Skips 2-3 times per week; Rice, noodles, salad with egg   Overnight: Chicken, rice, pizza  Bedtime snack-12am: Banana, apple  Water throughout day    GLUCOSE: Fasting in morning and 1 hour after meals    Date Pre-B Post-B Pre-L Post-L Pre-D Post-D  HS/NOC   10/3 123 104  113  97    10/4 114 140  114  96    10/5 " 120   123  144    10/6 97 115  127  92    10/7 105 122  119  130    10/8 125 114  117  122    10/9 104 123  117  125    10/10         101    Educational topics covered today:  Insulin resistance, NPH MOA, pen, mixing, dosing, injection technique, disposal, hypoglycemia symptoms and treatment    ASSESSMENT  Pre-existing T2DM seen in pregnancy to review glucose.She is currently 27 weeks, 1 day gestation. Overall, she is feeling well; emotional. She continues to eat every few hours, small portion sizes (fist size). She has been eating 2 pieces of fruit or salad with egg if hungry before bed a few times per week. Morning fasting 0% at goal, post meal numbers at goal. Unfortunately, there was issue with insurance and she was not able to get sensor. She is agreeable to start NPH 6 units at bedtime. Follow up in one week.     PLAN:  *Start NPH insulin 6 units at bedtime (10-11pm)  *Check glucose fasting in the morning and one hour after meals  *If you have bedtime snack, eat a small carb (one small apple, 1/2 banana) with protein    FOLLOW-UP:  *10/17 Video Visit with Gale at 9am    Time spent was 43 minutes  Encounter type: Individual    Gale Murray RN, [facility]  Diabetes Education Department  [facility], [place]  Phone: [phone]  [phone]    Any diabetes medication dose changes were made via the CDE Protocol and Collaborative [facility] Agreement [facility].  A copy of this encounter was provided to patient's referring provider-Ector Castro, thank you for allowing me to participate in the care of your patient.        Sincerely,        aGle Murray RN

## 2024-10-10 NOTE — NURSING NOTE
Current patient location:  [place]    Is the patient currently in the state of [place]? YES    Visit mode:VIDEO    If the visit is dropped, the patient can be reconnected by: VIDEO VISIT: Text to cell phone:   Telephone Information:   Mobile [phone]       Will anyone else be joining the visit? NO  (If patient encounters technical issues they should call [phone]    Are changes needed to the allergy or medication list? No    Are refills needed on medications prescribed by this physician? NO    Rooming Documentation:  Questionnaire(s) completed    Reason for visit: Video Visit and RECHECK    Fabiola COBURN

## 2024-10-11 RX ORDER — HUMAN INSULIN 100 [IU]/ML
6 INJECTION, SUSPENSION SUBCUTANEOUS AT BEDTIME
Qty: 10 ML | Refills: 0 | Status: SHIPPED | OUTPATIENT
Start: 2024-10-11

## 2024-10-11 RX ORDER — BLOOD SUGAR DIAGNOSTIC
STRIP MISCELLANEOUS
Qty: 100 EACH | Refills: 1 | Status: SHIPPED | OUTPATIENT
Start: 2024-10-11

## 2024-10-11 NOTE — TELEPHONE ENCOUNTER
NPH vials and syringes ordered.    Patient updated via voicemail. Advised to have pharmacist how to mix vial and draw from syringe. Encouraged to return call with questions.    Gale Murray RN, [facility]  Diabetes Education Department  [facility], [place]  Phone: [phone]  [phone]

## 2024-10-14 ENCOUNTER — PATIENT OUTREACH (OUTPATIENT)
Dept: CARE COORDINATION | Facility: CLINIC | Age: 29
End: 2024-10-14
Payer: COMMERCIAL

## 2024-10-15 DIAGNOSIS — Z34.90 PREGNANCY, UNSPECIFIED GESTATIONAL AGE: Primary | ICD-10-CM

## 2024-10-16 ENCOUNTER — PRENATAL OFFICE VISIT (OUTPATIENT)
Dept: FAMILY MEDICINE | Facility: CLINIC | Age: 29
End: 2024-10-16
Payer: COMMERCIAL

## 2024-10-16 VITALS
HEART RATE: 94 BPM | BODY MASS INDEX: 36.61 KG/M2 | WEIGHT: 186.5 LBS | SYSTOLIC BLOOD PRESSURE: 106 MMHG | RESPIRATION RATE: 16 BRPM | DIASTOLIC BLOOD PRESSURE: 69 MMHG | TEMPERATURE: 98.9 F | HEIGHT: 60 IN | OXYGEN SATURATION: 98 %

## 2024-10-16 DIAGNOSIS — E11.9 TYPE 2 DIABETES MELLITUS WITHOUT COMPLICATION, WITHOUT LONG-TERM CURRENT USE OF INSULIN (H): ICD-10-CM

## 2024-10-16 DIAGNOSIS — Z34.90 PREGNANCY, UNSPECIFIED GESTATIONAL AGE: Primary | ICD-10-CM

## 2024-10-16 LAB — HGB BLD-MCNC: 11.7 G/DL (ref 11.7–15.7)

## 2024-10-16 PROCEDURE — 36415 COLL VENOUS BLD VENIPUNCTURE: CPT | Performed by: FAMILY MEDICINE

## 2024-10-16 PROCEDURE — 99207 PR PRENATAL VISIT: CPT | Performed by: FAMILY MEDICINE

## 2024-10-16 PROCEDURE — 86780 TREPONEMA PALLIDUM: CPT | Performed by: FAMILY MEDICINE

## 2024-10-16 PROCEDURE — 85018 HEMOGLOBIN: CPT | Performed by: FAMILY MEDICINE

## 2024-10-16 NOTE — PROGRESS NOTES
"SUBJECTIVE:   at 28w0d. Estimated Date of Delivery: 2025.  She is doing well. She denies vaginal bleeding, leakage of fluid, or urinary symptoms. Fetal movement is present. She is not having contractions.  Concerns: NPH pen was not covered- but she was able to  NPH vial and syringes but has not yet started- hesitant to use needles. Has CDE visit tomorrow. Fasting sugars 100-120. Post-prandials are all less than 140. Endorses stress dealing with her 12 year old daughter's behavior- she is currently in intensive outpatient treatment.   ROS  Negative except as noted above in HPI  OBJECTIVE:  Blood pressure 106/69, pulse 94, temperature 98.9  F (37.2  C), temperature source Oral, resp. rate 16, height 1.514 m (4' 11.61\"), weight 84.6 kg (186 lb 8 oz), SpO2 98%, not currently breastfeeding.  Gen: Comfortable, no acute distress.  Abd: Gravid, non-tender  See OB Vitals flowsheet.  ASSESSMENT/PLAN:  Fan was seen today for prenatal care.    Diagnoses and all orders for this visit:    Pregnancy, unspecified gestational age    Type 2 diabetes mellitus without complication, without long-term current use of insulin (H): Co-managed with CDE/endocrinology and MFM- plan to start insulin NPH 6 units at bedtime to help with elevated fasting BS. Post-prandials at goal. Has appointment for fetal echocardiogram in 2 days and MFM appointment with growth US next week. Continue daily aspirin.      -IUP at 28w0d:   Prenatal labs reviewed   RTC in 2 weeks or sooner with problems. Tdap next visit.        Sully Barney MD    "

## 2024-10-17 ENCOUNTER — VIRTUAL VISIT (OUTPATIENT)
Dept: EDUCATION SERVICES | Facility: CLINIC | Age: 29
End: 2024-10-17
Payer: COMMERCIAL

## 2024-10-17 DIAGNOSIS — O24.313 PRE-EXISTING DIABETES MELLITUS AFFECTING PREGNANCY IN THIRD TRIMESTER, ANTEPARTUM: Primary | ICD-10-CM

## 2024-10-17 LAB — T PALLIDUM AB SER QL: NONREACTIVE

## 2024-10-17 PROCEDURE — 99207 PR NO CHARGE LOS: CPT | Mod: 95

## 2024-10-17 NOTE — PATIENT INSTRUCTIONS
PLAN:  *Start NPH insulin 6 units at bedtime (have cousin administer before leaving 8-11pm)  *Check glucose fasting in the morning and one hour after meals  *If you have bedtime snack, eat a small carb (one small apple, 1/2 banana) with protein    FOLLOW-UP:  *10/23 Video Visit with Gale at 11am

## 2024-10-17 NOTE — PROGRESS NOTES
"Virtual Visit Details    Type of service:  Video Visit   Video Start Time: 8:59 AM  Video End Time:9:21 AM    Originating Location (pt. Location): Home    Distant Location (provider location):  Off-site  Platform used for Video Visit: Florentin    Diabetes Self-Management Training - Pregnancy Complicated by Diabetes    SUBJECTIVE:  Fan Duran presents today for  education related to Pregnancy complicated by diabetes  She is accompanied by self  Patient concerns: fear of needles, cannot inject by herself    LMP  (LMP Unknown)     Lab Results   Component Value Date    GLC 94 01/12/2024    GLC 89 08/11/2022    GLC 77 02/16/2022       History   Smoking Status    Never   Smokeless Tobacco    Never       SOCIO/ECONOMIC HISTORY:  Lives with: spouse and 3 children  Recent family changes/social stressors: none noted  Language(s) spoken at home: English    ASSESSMENT:  Due date 1/8/2025 (ultrasound) Previous pregnancies? Yes  (# full term pregnancies 3 )  Problems in past pregnancy: pre-diabetes in last pregnancy  Profession-stay at home  Prepregnancy weight 175#  Height: 4'11\"  Current weight 186#    Medical conditions: None   Medications: None   Past hospitalizations None  Special dietary considerations:None  Exercise habits: None    Problems in current pregnancy: T2DM (A1C=6.6 1/2024)    NUTRITION:  Breakfast: Coffee  Lunch: Rice, noodles, vegetables  Dinner 7-8pm: Skips 2-3 times per week; Rice, noodles, salad with egg   Overnight: Chicken, rice, pizza  Bedtime snack-12am: Banana, apple  Water throughout day    GLUCOSE: Fasting in morning and 1 hour after meals  Morning fasting , this morning 120  Post prandial 120's    Educational topics covered today:  Insulin resistance, NPH MOA    ASSESSMENT  Pre-existing T2DM seen in pregnancy to review glucose after starting NPH. She is currently 28 weeks,1 day gestation. Overall, she is feeling well; emotional, daughter outpatient mental health treatment. Unfortunately, " she has not yet started NPH by vial and syringe due to fear of needles. States she is OK if someone else gives it but  is hunting and works late, daughter refuses to give. Patient was in car and did not have glucose readings with her. States morning fasting upper 90's to 101 since reducing bedtime snack. This morning 120. Post prandial in 120's. We discussed importance of good glucose control to reduce risk for complications. She is at cousins house every evening and will have her administer before she leaves (ranges from 8pm-11pm). Pharmacist did show patient how to use syringe. Follow up in one week.     PLAN:  *Start NPH insulin 6 units at bedtime (have cousin administer before leaving 8-11pm)  *Check glucose fasting in the morning and one hour after meals  *If you have bedtime snack, eat a small carb (one small apple, 1/2 banana) with protein    FOLLOW-UP:  *10/23 Video Visit with Gale at 11am    Time spent was 23 minutes  Encounter type: Individual    Gale Murray RN, [facility]  Diabetes Education Department  [facility], [place]  Phone: [phone]  [phone]    Any diabetes medication dose changes were made via the CDE Protocol and Collaborative Practice Agreement with [facility].  A copy of this encounter was provided to patient's referring provider-Ector

## 2024-10-17 NOTE — NURSING NOTE
Current patient location:  [place]    Is the patient currently in the state of [place]? YES    Visit mode:VIDEO    If the visit is dropped, the patient can be reconnected by: VIDEO VISIT: Text to cell phone:   Telephone Information:   Mobile [phone]       Will anyone else be joining the visit? NO  (If patient encounters technical issues they should call [phone]    Are changes needed to the allergy or medication list? No    Are refills needed on medications prescribed by this physician? NO    Rooming Documentation:  Questionnaire(s) completed    Reason for visit: Video Visit and RECHECK    Fabiola COBURN

## 2024-10-17 NOTE — LETTER
"10/17/2024      Fan Duran  [address] [place] [address]      Dear Colleague,    Thank you for referring your patient, Fan Duran, to the [facility]. Please see a copy of my visit note below.    Virtual Visit Details    Type of service:  Video Visit   Video Start Time: 8:59 AM  Video End Time:9:21 AM    Originating Location (pt. Location): Home    Distant Location (provider location):  Off-site  Platform used for Video Visit: Florentin    Diabetes Self-Management Training - Pregnancy Complicated by Diabetes    SUBJECTIVE:  Fan Duran presents today for  education related to Pregnancy complicated by diabetes  She is accompanied by self  Patient concerns: fear of needles, cannot inject by herself    LMP  (LMP Unknown)     Lab Results   Component Value Date    GLC 94 01/12/2024    GLC 89 08/11/2022    GLC 77 02/16/2022       History   Smoking Status     Never   Smokeless Tobacco     Never       SOCIO/ECONOMIC HISTORY:  Lives with: spouse and 3 children  Recent family changes/social stressors: none noted  Language(s) spoken at home: English    ASSESSMENT:  Due date 1/8/2025 (ultrasound) Previous pregnancies? Yes  (# full term pregnancies 3 )  Problems in past pregnancy: pre-diabetes in last pregnancy  Profession-stay at home  Prepregnancy weight 175#  Height: 4'11\"  Current weight 186#    Medical conditions: None   Medications: None   Past hospitalizations None  Special dietary considerations:None  Exercise habits: None    Problems in current pregnancy: T2DM (A1C=6.6 1/2024)    NUTRITION:  Breakfast: Coffee  Lunch: Rice, noodles, vegetables  Dinner 7-8pm: Skips 2-3 times per week; Rice, noodles, salad with egg   Overnight: Chicken, rice, pizza  Bedtime snack-12am: Banana, apple  Water throughout day    GLUCOSE: Fasting in morning and 1 hour after meals  Morning fasting , this morning 120  Post prandial 120's    Educational topics covered today:  Insulin " resistance, NPH MOA    ASSESSMENT  Pre-existing T2DM seen in pregnancy to review glucose after starting NPH. She is currently 28 weeks,1 day gestation. Overall, she is feeling well; emotional, daughter outpatient mental health treatment. Unfortunately, she has not yet started NPH by vial and syringe due to fear of needles. States she is OK if someone else gives it but  is hunting and works late, daughter refuses to give. Patient was in car and did not have glucose readings with her. States morning fasting upper 90's to 101 since reducing bedtime snack. This morning 120. Post prandial in 120's. We discussed importance of good glucose control to reduce risk for complications. She is at cousins house every evening and will have her administer before she leaves (ranges from 8pm-11pm). Pharmacist did show patient how to use syringe. Follow up in one week.     PLAN:  *Start NPH insulin 6 units at bedtime (have cousin administer before leaving 8-11pm)  *Check glucose fasting in the morning and one hour after meals  *If you have bedtime snack, eat a small carb (one small apple, 1/2 banana) with protein    FOLLOW-UP:  *10/23 Video Visit with Gale at 11am    Time spent was 23 minutes  Encounter type: Individual    Gale Murray RN, [facility]  Diabetes Education Department  [facility], [place]  Phone: [phone]  [phone]    Any diabetes medication dose changes were made via the CDE Protocol and Collaborative Practice Agreement with [facility].  A copy of this encounter was provided to patient's referring provider-Ector      Again, thank you for allowing me to participate in the care of your patient.        Sincerely,        Gale Murray RN

## 2024-10-18 ENCOUNTER — HOSPITAL ENCOUNTER (OUTPATIENT)
Dept: CARDIOLOGY | Facility: CLINIC | Age: 29
Discharge: HOME | End: 2024-10-18
Attending: OBSTETRICS & GYNECOLOGY | Admitting: OBSTETRICS & GYNECOLOGY
Payer: COMMERCIAL

## 2024-10-18 ENCOUNTER — PRE VISIT (OUTPATIENT)
Dept: MATERNAL FETAL MEDICINE | Facility: HOSPITAL | Age: 29
End: 2024-10-18
Payer: COMMERCIAL

## 2024-10-18 ENCOUNTER — OFFICE VISIT (OUTPATIENT)
Dept: PEDIATRIC CARDIOLOGY | Facility: CLINIC | Age: 29
End: 2024-10-18
Payer: COMMERCIAL

## 2024-10-18 DIAGNOSIS — O24.112 TYPE 2 DIABETES MELLITUS IN PREGNANCY, SECOND TRIMESTER: ICD-10-CM

## 2024-10-18 DIAGNOSIS — O35.BXX0 ANOMALY OF HEART OF FETUS AFFECTING PREGNANCY, ANTEPARTUM, SINGLE OR UNSPECIFIED FETUS: Primary | ICD-10-CM

## 2024-10-18 PROCEDURE — 76825 ECHO EXAM OF FETAL HEART: CPT | Mod: 26 | Performed by: PEDIATRICS

## 2024-10-18 PROCEDURE — 99202 OFFICE O/P NEW SF 15 MIN: CPT | Mod: 25 | Performed by: PEDIATRICS

## 2024-10-18 PROCEDURE — 76827 ECHO EXAM OF FETAL HEART: CPT | Mod: 26 | Performed by: PEDIATRICS

## 2024-10-18 PROCEDURE — 93325 DOPPLER ECHO COLOR FLOW MAPG: CPT

## 2024-10-18 PROCEDURE — 93325 DOPPLER ECHO COLOR FLOW MAPG: CPT | Mod: 26 | Performed by: PEDIATRICS

## 2024-10-18 NOTE — PROGRESS NOTES
[facility]   Heart Center Fetal Consult Note    Patient:  Fan Duran MRN:  [id number]   YOB: 1995 Age:  29 year old   Date of Visit:  10/18/2024 PCP:  Gloria Berg MD     Dear Doctor:    I had the pleasure of seeing Fan Duran at the [facility] on 10/18/2024 in fetal cardiology consultation for fetal echocardiogram results. She presented today by herself. As you know, she is a 29 year old  at 28w2d who presented for fetal echocardiogram today because of type 2 diabetes.     I performed and interpreted the fetal echocardiogram today, which demonstrated normal fetal cardiac anatomy. Normal fetal intracardiac connections. Normal right and left ventricular size and function. No pericardial effusion. Fetal heart rate is regular at 138 bpm.      I reviewed today's findings with Fan Duran. She is aware that the study was within normal limits with no major cardiac abnormalities. She is aware of the general limitations of fetal echocardiography. No additional fetal echocardiograms are recommended. No  cardiac follow-up is required.     Thank you for allowing me to participate in Fan's care. Please do not hesitate to contact me with questions or concerns.    This visit was separate from the performance and interpretation of the ultrasound. The majority of the time (>50%) was spent in counseling and coordination of care. I spent approximately 20 minutes in face-to-face time reviewing the above considerations.    Don Castillo M.D.  Pediatric Cardiology  [address] Academic Office [address] [facility], [place] [address]  Phone [phone]  Fax [phone]

## 2024-10-18 NOTE — LETTER
10/18/2024      RE: Fan Duran  [address] [place] [address]     Dear Colleague,    Thank you for the opportunity to participate in the care of your patient, Fan Duran, at the [facility]. Please see a copy of my visit note below.    [facility]   Heart Center Fetal Consult Note    Patient:  Fan Duran MRN:  [id number]   YOB: 1995 Age:  29 year old   Date of Visit:  10/18/2024 PCP:  Gloria Berg MD     Dear Doctor:    I had the pleasure of seeing Fan Duran at the [facility] on 10/18/2024 in fetal cardiology consultation for fetal echocardiogram results. She presented today by herself. As you know, she is a 29 year old  at 28w2d who presented for fetal echocardiogram today because of type 2 diabetes.     I performed and interpreted the fetal echocardiogram today, which demonstrated normal fetal cardiac anatomy. Normal fetal intracardiac connections. Normal right and left ventricular size and function. No pericardial effusion. Fetal heart rate is regular at 138 bpm.      I reviewed today's findings with Fan Duran. She is aware that the study was within normal limits with no major cardiac abnormalities. She is aware of the general limitations of fetal echocardiography. No additional fetal echocardiograms are recommended. No  cardiac follow-up is required.     Thank you for allowing me to participate in Fan's care. Please do not hesitate to contact me with questions or concerns.    This visit was separate from the performance and interpretation of the ultrasound. The majority of the time (>50%) was spent in counseling and coordination of care. I spent approximately 20 minutes in face-to-face time reviewing the above considerations.    Don Castillo M.D.  Pediatric Cardiology  [facility]  [facility]  [address], Academic Office Building 4th floor, [facility] [address]  Phone [phone]  Fax [phone]      Please do not hesitate to contact me if you have any questions/concerns.     Sincerely,       Manuel Castillo MD

## 2024-10-22 ENCOUNTER — ANCILLARY PROCEDURE (OUTPATIENT)
Dept: ULTRASOUND IMAGING | Facility: HOSPITAL | Age: 29
End: 2024-10-22
Attending: OBSTETRICS & GYNECOLOGY
Payer: COMMERCIAL

## 2024-10-22 ENCOUNTER — OFFICE VISIT (OUTPATIENT)
Dept: MATERNAL FETAL MEDICINE | Facility: HOSPITAL | Age: 29
End: 2024-10-22
Attending: OBSTETRICS & GYNECOLOGY
Payer: COMMERCIAL

## 2024-10-22 ENCOUNTER — LAB (OUTPATIENT)
Dept: LAB | Facility: HOSPITAL | Age: 29
End: 2024-10-22
Attending: OBSTETRICS & GYNECOLOGY
Payer: COMMERCIAL

## 2024-10-22 DIAGNOSIS — Z36.0 ENCOUNTER FOR ANTENATAL SCREENING FOR CHROMOSOMAL ANOMALIES: Primary | ICD-10-CM

## 2024-10-22 DIAGNOSIS — O24.112 TYPE 2 DIABETES MELLITUS IN PREGNANCY, SECOND TRIMESTER: ICD-10-CM

## 2024-10-22 DIAGNOSIS — Z36.0 ENCOUNTER FOR ANTENATAL SCREENING FOR CHROMOSOMAL ANOMALIES: ICD-10-CM

## 2024-10-22 DIAGNOSIS — O24.112 TYPE 2 DIABETES MELLITUS IN PREGNANCY, SECOND TRIMESTER: Primary | ICD-10-CM

## 2024-10-22 PROCEDURE — 76811 OB US DETAILED SNGL FETUS: CPT | Mod: 26 | Performed by: OBSTETRICS & GYNECOLOGY

## 2024-10-22 PROCEDURE — 96040 HC GENETIC COUNSELING, EACH 30 MINUTES: CPT | Performed by: GENETIC COUNSELOR, MS

## 2024-10-22 PROCEDURE — 76811 OB US DETAILED SNGL FETUS: CPT

## 2024-10-22 PROCEDURE — 36415 COLL VENOUS BLD VENIPUNCTURE: CPT

## 2024-10-22 PROCEDURE — 99207 PR NO CHARGE LOS: CPT | Performed by: OBSTETRICS & GYNECOLOGY

## 2024-10-22 NOTE — NURSING NOTE
Fan Duran is a  at 28w6d who presents to [facility] for GC and L2 ultrasound. Pt reports positive fetal movement. Pt denies bldg/lof/change in discharge, contractions, headache, vision changes, chest pain/SOB or edema. SBAR given to Dr. Rodgers, see note in Epic.

## 2024-10-22 NOTE — PROGRESS NOTES
[facility]  Genetic Counseling Consult    Patient:  Fan Duran  Preferred Name: Fan YOB: 1995   Date of Service:  10/22/24   MRN: [id number]    Fan was seen at the [facility] [place] for genetic consultation. The indication for genetic counseling is desire to discuss options for genetic screening and diagnostics. The patient was unaccompanied to this visit.          IMPRESSION/ PLAN   During today's [facility] visit, Fan had a blood draw for expanded non-invasive prenatal testing (also called NIPT, NIPS, or cell-free DNA) through [facility] [facility] The expanded NIPT screens for trisomy 21, 18, and 13 and select microdeletion syndromes, including 22q11.2 deletion syndrome. The patient opted to screen for sex chromosome aneuploidies, including reported fetal sex. Results are expected in 7-14 days. The patient will be called with results and if they do not answer they requested a detailed message with results on their voicemail, including the predicted fetal sex information.  Patient was informed that results, including fetal sex, will be available in [employer].    Fan had a level II comprehensive anatomy ultrasound today. Please see the ultrasound report for further details.    PREGNANCY HISTORY   /Parity:       Fan's pregnancy history is significant for:   2012: term, , female  2017: term,  male  2019: SAB  2: term, , female  : SAB    CURRENT PREGNANCY   Current Age: 29 year old   Age at Delivery: 29 year old  IDALIA: 2025, by Ultrasound                                   Gestational Age: 28w6d  This pregnancy is a single gestation.     MEDICAL HISTORY   Fan has type 2 diabetes. Pregestational diabetes mellitus (PGDM), both type I and type II, is associated with a risk of major birth defects of 5-10% (2-3x general population risk).  The most common  "and severe congenital anomalies associated with diabetic pregnancy are neural tube defects and cardiovascular malformations; however, diabetic embryopathy can affect any developing organ system.  PGDM is also associated with pregnancy loss in 15-20% of pregnancies, mostly in the first trimester. High HbA1c and high blood glucose levels in the first trimester are correlated with increased risk of birth defects, spontaneous abortions, stillbirths and other pregnancy complications.  Maternal complications may include risk of progression of diabetic retinopathy, risk of progressive diabetic nephropathy, and increased risk of hypertension including intrauterine growth restriction (IUGR), preeclampsia, abruption placenta, and stroke.  Uncontrolled diabetes in the second and third trimesters increases risk of IUGR; macrosomia;  labor;  jaundice, hypoglycemia, respiratory distress, and transient tachypnea;  mortality; and developmental defects including lower fine and gross motor skills scores and poorer language abilities.        FAMILY HISTORY   A three-generation pedigree was obtained today and is scanned under the \"Media\" tab in Epic. The family history was reported by Fan.    The reported family history is unremarkable for multiple miscarriages, stillbirths, birth defects, intellectual disabilities, known genetic conditions, and consanguinity.       RISK ASSESSMENT FOR CHROMOSOME CONDITIONS   We explained that the risk for fetal chromosome abnormalities increases with maternal age. We discussed specific features of common chromosome abnormalities, including trisomy 21 (Down syndrome), trisomy 13, trisomy 18, and sex chromosome trisomies.    At age 29 at midtrimester, the risk to have a baby with Down syndrome is 1 in 760.   At age 29 at midtrimester, the risk to have a baby with any chromosome abnormality is 1 in 380.     Fan has not had genetic screening in this pregnancy but " elected to have screening today.      GENETIC TESTING OPTIONS   Genetic testing during a pregnancy includes screening and diagnostic procedures.      Screening tests are non-invasive which means no risk to the pregnancy and includes ultrasounds and blood work. The benefits and limitations of screening were reviewed. Screening tests provide a risk assessment (chance) specific to the pregnancy for certain fetal chromosome abnormalities but cannot definitively diagnose or exclude a fetal chromosome abnormality. Follow-up genetic counseling and consideration of diagnostic testing is recommended with any abnormal screening result. Diagnostic testing during a pregnancy is more certain and can test for more conditions. However, the tests do have a risk of miscarriage that requires careful consideration. These tests can detect fetal chromosome abnormalities with greater than 99% certainty. Results can be compromised by maternal cell contamination or mosaicism and are limited by the resolution of current genetic testing technology.     There is no screening or diagnostic test that detects all forms of birth defects or intellectual disability.     We discussed the following screening options:   Non-invasive prenatal testing (NIPT)  Also called cell-free DNA screening because it detects chromosomes from the placenta in the pregnant person's blood  Can be done any time after 10 weeks gestation  Standard recommendation for NIPT screens for trisomy 21, trisomy 18, trisomy 13, with the option of adding sex chromosome aneuploidies, without or without predicted sex  Cannot screen for open neural tube defects, maternal serum AFP after 15 weeks is recommended  New NIPT options include screening for other trisomies, microdeletion syndromes, and in some cases fetal blood antigens. Guidelines do not recommend these conditions are included in standard screening. These options have limitations and should be discussed with a genetic  counselor.   However, current (2023) ACMG guidelines do recommend that screening for one microdeletion syndrome, called 22q11.2 deletion syndrome be offered to all pregnant patients. 22q11.2 deletion syndrome has an estimated prevalence of 1 in 990 to 1 in 2148 (0.05-0.1%). Risk is not thought to increase with maternal age. Clinical features are variable but include congenital heart defects, cleft palate, developmental delays, immune system deficiencies, and hearing loss. Approximately 90% of cases are de sudha (a sporadic new change in a pregnancy). Cell-free DNA screening for 22q11.2 deletion syndrome is available with the inclusion of other microdeletion syndromes. There is less data about the performance of cell-free DNA screening for more rare microdeletions and the chance for false positives or negative may be increased.  We discussed the limitations of cell-free DNA screening in detecting microdeletions and the possibility of false positives and false negatives. The patient opted into microdeletion syndrome screening.     We discussed the following ultrasound options:  Comprehensive level II ultrasound (Fetal Anatomy Ultrasound)  Ultrasound done between 18-20 weeks gestation  Screens for major birth defects and markers for aneuploidy (like trisomy 21 and trisomy 18)  Includes looking at the fetus/baby's growth, heart, organs (stomach, kidneys), placenta, and amniotic fluid    We discussed the following diagnostic options:   Amniocentesis  Invasive diagnostic procedure done after 15 weeks gestation  The procedure collects a small sample of amniotic fluid for the purpose of chromosomal testing and/or other genetic testing  Diagnostic result; more than 99% sensitivity for fetal chromosome abnormalities  Testing for AFP in the amniotic fluid can test for open neural tube defects    It was a pleasure to be involved with Fan mcnair care. Face-to-face time of the meeting was  25  minutes.    Vy Moseley,  GC, MS, LGC  Board Certified and [place] Licensed Genetic Counselor  [facility]  Maternal Fetal Medicine  Office: [phone]  [facility]: [phone]   Fax: [phone]  [facility]

## 2024-10-22 NOTE — PROGRESS NOTES
Please see full imaging report from ViewPoint program under imaging tab.    Sid Rodgers MD  Maternal Fetal Medicine

## 2024-10-23 ENCOUNTER — VIRTUAL VISIT (OUTPATIENT)
Dept: EDUCATION SERVICES | Facility: CLINIC | Age: 29
End: 2024-10-23
Payer: COMMERCIAL

## 2024-10-23 DIAGNOSIS — O24.313 PRE-EXISTING DIABETES MELLITUS AFFECTING PREGNANCY IN THIRD TRIMESTER, ANTEPARTUM: Primary | ICD-10-CM

## 2024-10-23 PROCEDURE — 99207 PR NO BILLABLE SERVICE THIS VISIT: CPT | Mod: 95

## 2024-10-23 ASSESSMENT — PAIN SCALES - GENERAL: PAINLEVEL_OUTOF10: NO PAIN (0)

## 2024-10-23 NOTE — PROGRESS NOTES
"Virtual Visit Details    Type of service:  Video Visit   Video Start Time: 11:11 AM  Video End Time: 11:35 PM    Originating Location (pt. Location): Home    Distant Location (provider location):  On-site  Platform used for Video Visit: Florentin      Diabetes Self-Management Training - Pregnancy Complicated by Diabetes    SUBJECTIVE:  Fan Duran presents today for  education related to Pregnancy complicated by diabetes  She is accompanied by self  Patient concerns: has not yet started NPH    LMP  (LMP Unknown)     Lab Results   Component Value Date    GLC 94 01/12/2024    GLC 89 08/11/2022    GLC 77 02/16/2022       History   Smoking Status    Never   Smokeless Tobacco    Never       SOCIO/ECONOMIC HISTORY:  Lives with: spouse and 3 children  Recent family changes/social stressors: none noted  Language(s) spoken at home: English    ASSESSMENT:  Due date 1/8/2025 (ultrasound) Previous pregnancies? Yes  (# full term pregnancies 3 )  Problems in past pregnancy: pre-diabetes in last pregnancy  Profession-stay at home  Prepregnancy weight 175#  Height: 4'11\"  Current weight 186#    Medical conditions: None   Medications: None   Past hospitalizations None  Special dietary considerations:None  Exercise habits: None    Problems in current pregnancy: T2DM (A1C=6.6 1/2024)    NUTRITION:  Breakfast: Coffee  Lunch: Rice, noodles, vegetables  Dinner 7-8pm: Skips 2-3 times per week; Rice, noodles, salad with egg   Overnight: Chicken, rice, pizza  Bedtime snack-12am: Banana, apple  Water throughout day    GLUCOSE: Fasting in morning and 1 hour after meals    Date Pre-B Post-B Pre-L Post-L Pre-D Post-D  HS/NOC   10/18 104 113  130  126    10/19 105 119  176  134    10/20 114 120  126  82    10/21 120 119  120  126    10/23 106                                 Educational topics covered today:  Insulin resistance, NPH vial and syringe    ASSESSMENT  Pre-existing T2DM seen in pregnancy to review glucose after starting NPH. She " is currently 29 weeks,0 day gestation. Overall, she is feeling well, baby boy active. Unfortunately, she has not yet started evening NPH as cousin did not feel comfortable with vial and syringe. Demonstration of how to mix and draw up insulin done today with patient doing at same time. Morning fasting 0% at goal with average of 110. Post prandial high was from over eating at party. She did try reducing and eliminating evening snack however morning glucose remains elevated. She will start NPH this evening. Follow up in one week. She verbalized understanding to contact me if having lows.     PLAN:  *Start NPH insulin 6 units at bedtime (have cousin administer before leaving 8-11pm)  *Check glucose fasting in the morning and one hour after meals    FOLLOW-UP:  *10/30 Telephone Visit with Gale at 11am    Time spent was 24 minutes  Encounter type: Individual    Gale Murray RN, [facility]  Diabetes Education Department  [facility]  Phone: [phone]  [phone]    Any diabetes medication dose changes were made via the CDE Protocol and Collaborative Practice Agreement with [facility].  A copy of this encounter was provided to patient's referring provider.

## 2024-10-23 NOTE — NURSING NOTE
Current patient location: Patient declined to provide     Is the patient currently in the state of [place]? YES    Visit mode:VIDEO    If the visit is dropped, the patient can be reconnected by: VIDEO VISIT: Text to cell phone:   Telephone Information:   Mobile [phone]       Will anyone else be joining the visit? NO  (If patient encounters technical issues they should call [phone]    Are changes needed to the allergy or medication list? No    Are refills needed on medications prescribed by this physician? NO    Rooming Documentation:  Questionnaire(s) not done per department protocol    Reason for visit: Diabetes Education    Shelby Kocher VVF

## 2024-10-23 NOTE — LETTER
"10/23/2024      Fan Duran  [address] [place] [address]      Dear Colleague,    Thank you for referring your patient, Fan Duran, to the [facility]. Please see a copy of my visit note below.    Virtual Visit Details    Type of service:  Video Visit   Video Start Time: 11:11 AM  Video End Time: 11:35 PM    Originating Location (pt. Location): Home    Distant Location (provider location):  On-site  Platform used for Video Visit: Florentin      Diabetes Self-Management Training - Pregnancy Complicated by Diabetes    SUBJECTIVE:  Fan Duran presents today for  education related to Pregnancy complicated by diabetes  She is accompanied by self  Patient concerns: has not yet started NPH    LMP  (LMP Unknown)     Lab Results   Component Value Date    GLC 94 01/12/2024    GLC 89 08/11/2022    GLC 77 02/16/2022       History   Smoking Status     Never   Smokeless Tobacco     Never       SOCIO/ECONOMIC HISTORY:  Lives with: spouse and 3 children  Recent family changes/social stressors: none noted  Language(s) spoken at home: English    ASSESSMENT:  Due date 1/8/2025 (ultrasound) Previous pregnancies? Yes  (# full term pregnancies 3 )  Problems in past pregnancy: pre-diabetes in last pregnancy  Profession-stay at home  Prepregnancy weight 175#  Height: 4'11\"  Current weight 186#    Medical conditions: None   Medications: None   Past hospitalizations None  Special dietary considerations:None  Exercise habits: None    Problems in current pregnancy: T2DM (A1C=6.6 1/2024)    NUTRITION:  Breakfast: Coffee  Lunch: Rice, noodles, vegetables  Dinner 7-8pm: Skips 2-3 times per week; Rice, noodles, salad with egg   Overnight: Chicken, rice, pizza  Bedtime snack-12am: Banana, apple  Water throughout day    GLUCOSE: Fasting in morning and 1 hour after meals    Date Pre-B Post-B Pre-L Post-L Pre-D Post-D  HS/NOC   10/18 104 113  130  126    10/19 105 119  176  134    10/20 114 120  126  " 82    10/21 120 119  120  126    10/23 106                                 Educational topics covered today:  Insulin resistance, NPH vial and syringe    ASSESSMENT  Pre-existing T2DM seen in pregnancy to review glucose after starting NPH. She is currently 29 weeks,0 day gestation. Overall, she is feeling well, baby boy active. Unfortunately, she has not yet started evening NPH as cousin did not feel comfortable with vial and syringe. Demonstration of how to mix and draw up insulin done today with patient doing at same time. Morning fasting 0% at goal with average of 110. Post prandial high was from over eating at party. She did try reducing and eliminating evening snack however morning glucose remains elevated. She will start NPH this evening. Follow up in one week. She verbalized understanding to contact me if having lows.     PLAN:  *Start NPH insulin 6 units at bedtime (have cousin administer before leaving 8-11pm)  *Check glucose fasting in the morning and one hour after meals    FOLLOW-UP:  *10/30 Telephone Visit with Gale at 11am    Time spent was 24 minutes  Encounter type: Individual    Gale Murray RN, [facility]  Diabetes Education Department  [facility]  Phone: [phone]  [phone]    Any diabetes medication dose changes were made via the CDE Protocol and Collaborative Practice Agreement with [facility].  A copy of this encounter was provided to patient's referring provider.      Again, thank you for allowing me to participate in the care of your patient.        Sincerely,        Gale Murray RN

## 2024-10-30 ENCOUNTER — PRENATAL OFFICE VISIT (OUTPATIENT)
Dept: FAMILY MEDICINE | Facility: CLINIC | Age: 29
End: 2024-10-30
Payer: COMMERCIAL

## 2024-10-30 ENCOUNTER — VIRTUAL VISIT (OUTPATIENT)
Dept: EDUCATION SERVICES | Facility: CLINIC | Age: 29
End: 2024-10-30
Payer: COMMERCIAL

## 2024-10-30 VITALS
HEIGHT: 60 IN | TEMPERATURE: 98.8 F | OXYGEN SATURATION: 98 % | BODY MASS INDEX: 37.06 KG/M2 | HEART RATE: 102 BPM | RESPIRATION RATE: 18 BRPM | WEIGHT: 188.75 LBS | DIASTOLIC BLOOD PRESSURE: 74 MMHG | SYSTOLIC BLOOD PRESSURE: 110 MMHG

## 2024-10-30 DIAGNOSIS — Z34.90 PREGNANCY, UNSPECIFIED GESTATIONAL AGE: Primary | ICD-10-CM

## 2024-10-30 DIAGNOSIS — O24.313 PRE-EXISTING DIABETES MELLITUS AFFECTING PREGNANCY IN THIRD TRIMESTER, ANTEPARTUM: Primary | ICD-10-CM

## 2024-10-30 DIAGNOSIS — E11.9 TYPE 2 DIABETES MELLITUS WITHOUT COMPLICATION, WITHOUT LONG-TERM CURRENT USE OF INSULIN (H): ICD-10-CM

## 2024-10-30 DIAGNOSIS — S39.013A STRAIN OF MUSCLE OF PELVIS: ICD-10-CM

## 2024-10-30 DIAGNOSIS — Z76.0 ENCOUNTER FOR MEDICATION REFILL: ICD-10-CM

## 2024-10-30 PROCEDURE — 99207 PR PRENATAL VISIT: CPT | Performed by: FAMILY MEDICINE

## 2024-10-30 PROCEDURE — 90471 IMMUNIZATION ADMIN: CPT | Performed by: FAMILY MEDICINE

## 2024-10-30 PROCEDURE — 90715 TDAP VACCINE 7 YRS/> IM: CPT | Performed by: FAMILY MEDICINE

## 2024-10-30 PROCEDURE — 99207 PR NO BILLABLE SERVICE THIS VISIT: CPT | Mod: 93

## 2024-10-30 RX ORDER — ALBUTEROL SULFATE 90 UG/1
2 INHALANT RESPIRATORY (INHALATION) EVERY 6 HOURS PRN
Qty: 18 G | Refills: 6 | Status: SHIPPED | OUTPATIENT
Start: 2024-10-30

## 2024-10-30 NOTE — PROGRESS NOTES
"Virtual Visit Details    Type of service:  Telephone Visit   Phone call duration: 14 minutes   Originating Location (pt. Location): Home    Distant Location (provider location):  On-site    Diabetes Self-Management Training - Pregnancy Complicated by Diabetes    SUBJECTIVE:  Fan Duran presents today for  education related to Pregnancy complicated by diabetes  She is accompanied by self  Patient concerns: No concerns, happier morning numbers are better    LMP  (LMP Unknown)     Lab Results   Component Value Date    GLC 94 01/12/2024    GLC 89 08/11/2022    GLC 77 02/16/2022       History   Smoking Status    Never   Smokeless Tobacco    Never       SOCIO/ECONOMIC HISTORY:  Lives with: spouse and 3 children  Recent family changes/social stressors: none noted  Language(s) spoken at home: English    ASSESSMENT:  Due date 1/8/2025 (ultrasound) Previous pregnancies? Yes  (# full term pregnancies 3 )  Problems in past pregnancy: pre-diabetes in last pregnancy  Profession-stay at home  Prepregnancy weight 175#  Height: 4'11\"  Current weight 186#    Medical conditions: None   Medications: None   Past hospitalizations None  Special dietary considerations:None  Exercise habits: None    Problems in current pregnancy: T2DM (A1C=6.6 1/2024)    NUTRITION:  Breakfast: Coffee  Lunch: Rice, noodles, vegetables  Dinner 7-8pm: Skips 2-3 times per week; Rice, noodles, salad with egg   Overnight: Chicken, rice, pizza  Bedtime snack-12am: Banana, apple  Water throughout day    GLUCOSE: Accu Chek Guide: Fasting in morning and 1 hour after meals    Date Pre-B Post-B Pre-L Post-L Pre-D Post-D  HS/NOC   10/24 96 104  126  118    10/25 94 112  131  120    10/26 92 115  117  130    10/27 93 119  121  139    10/28 100 103  129  128    10/29 92 120  121  117    10/30 94           Educational topics covered today:  Insulin-rotate sites, symptoms of low blood sugar (reviewed 10/10 My Chart Message)    ASSESSMENT  Pre-existing T2DM seen in " "pregnancy to review glucose after starting NPH. She is currently 30 weeks, 0 day gestation. Overall, she is feeling well, baby boy active. She has been injecting insulin by herself, did have issue the first night where she accidentally poked her finger. Morning fasting now 71% at goal, 1 hour post prandials 100% at goal. She did report episode of waking up hot/sweaty; did not check glucose. No changes to regimen. Follow up in one week. Reminded to contact me if has 2 or more lows <70 in a row.     PLAN:  *Continue NPH 6 units before bed  *Check sugar anytime you feel \"off\" Ex: sweaty, dizzy, shaky  *Check glucose fasting in the morning and one hour after meals    FOLLOW-UP:  * Telephone Visit with Gale at 11am    Time spent was 14 minutes  Encounter type: Individual    Gale Murray RN, [facility]  Diabetes Education Department  [facility]  Phone: [phone]  [phone]    Any diabetes medication dose changes were made via the CDE Protocol and Collaborative Practice Agreement with [facility].  A copy of this encounter was provided to patient's referring provider.    "

## 2024-10-30 NOTE — LETTER
"10/30/2024      Fan Duran  [address] [place] [address]      Dear Colleague,    Thank you for referring your patient, Fan Duran, to the [facility]. Please see a copy of my visit note below.    Virtual Visit Details    Type of service:  Telephone Visit   Phone call duration: 14 minutes   Originating Location (pt. Location): Home    Distant Location (provider location):  On-site    Diabetes Self-Management Training - Pregnancy Complicated by Diabetes    SUBJECTIVE:  Fan Duran presents today for  education related to Pregnancy complicated by diabetes  She is accompanied by self  Patient concerns: No concerns, happier morning numbers are better    LMP  (LMP Unknown)     Lab Results   Component Value Date    GLC 94 01/12/2024    GLC 89 08/11/2022    GLC 77 02/16/2022       History   Smoking Status     Never   Smokeless Tobacco     Never       SOCIO/ECONOMIC HISTORY:  Lives with: spouse and 3 children  Recent family changes/social stressors: none noted  Language(s) spoken at home: English    ASSESSMENT:  Due date 1/8/2025 (ultrasound) Previous pregnancies? Yes  (# full term pregnancies 3 )  Problems in past pregnancy: pre-diabetes in last pregnancy  Profession-stay at home  Prepregnancy weight 175#  Height: 4'11\"  Current weight 186#    Medical conditions: None   Medications: None   Past hospitalizations None  Special dietary considerations:None  Exercise habits: None    Problems in current pregnancy: T2DM (A1C=6.6 1/2024)    NUTRITION:  Breakfast: Coffee  Lunch: Rice, noodles, vegetables  Dinner 7-8pm: Skips 2-3 times per week; Rice, noodles, salad with egg   Overnight: Chicken, rice, pizza  Bedtime snack-12am: Banana, apple  Water throughout day    GLUCOSE: Accu Chek Guide: Fasting in morning and 1 hour after meals    Date Pre-B Post-B Pre-L Post-L Pre-D Post-D  HS/NOC   10/24 96 104  126  118    10/25 94 112  131  120    10/26 92 115  117  130    10/27 93 119  " "121  139    10/28 100 103  129  128    10/29 92 120  121  117    10/30 94           Educational topics covered today:  Insulin-rotate sites, symptoms of low blood sugar (reviewed 10/10 My Chart Message)    ASSESSMENT  Pre-existing T2DM seen in pregnancy to review glucose after starting NPH. She is currently 30 weeks, 0 day gestation. Overall, she is feeling well, baby boy active. She has been injecting insulin by herself, did have issue the first night where she accidentally poked her finger. Morning fasting now 71% at goal, 1 hour post prandials 100% at goal. She did report episode of waking up hot/sweaty; did not check glucose. No changes to regimen. Follow up in one week. Reminded to contact me if has 2 or more lows <70 in a row.     PLAN:  *Continue NPH 6 units before bed  *Check sugar anytime you feel \"off\" Ex: sweaty, dizzy, shaky  *Check glucose fasting in the morning and one hour after meals    FOLLOW-UP:  * Telephone Visit with Gale at 11am    Time spent was 14 minutes  Encounter type: Individual    Gale Murray RN, [facility]  Diabetes Education Department  [facility]  Phone: [phone]  [phone]    Any diabetes medication dose changes were made via the CDE Protocol and Collaborative Practice Agreement with [facility].  A copy of this encounter was provided to patient's referring provider.      Again, thank you for allowing me to participate in the care of your patient.        Sincerely,        Gale Murray RN  "

## 2024-10-30 NOTE — NURSING NOTE
Current patient location:  [place]    Is the patient currently in the state of [place]? YES    Visit mode:VIDEO    If the visit is dropped, the patient can be reconnected by: VIDEO VISIT: Text to cell phone:   Telephone Information:   Mobile [phone]       Will anyone else be joining the visit? NO  (If patient encounters technical issues they should call [phone]    Are changes needed to the allergy or medication list? No    Are refills needed on medications prescribed by this physician? NO    Rooming Documentation:  Questionnaire(s) completed    Reason for visit: Video Visit and RECHECK    Fabiola COBURN

## 2024-10-30 NOTE — PROGRESS NOTES
"SUBJECTIVE:   at 30w0d. Estimated Date of Delivery: 2025.  She is doing well. She denies vaginal bleeding, leakage of fluid, or urinary symptoms. Fetal movement is present. She is not having contractions.  Concerns: having more pelvic pain this pregnancy- she had pregnancy massage from community last pregnancy that helped. She has tried some exercises. Has not tried a support belt. Difficult for her to get up after sitting on floor.   ROS  Negative except as noted above in HPI  OBJECTIVE:  Blood pressure 110/74, pulse 102, temperature 98.8  F (37.1  C), temperature source Oral, resp. rate 18, height 1.514 m (4' 11.61\"), weight 85.6 kg (188 lb 12 oz), SpO2 98%, not currently breastfeeding.  Gen: Comfortable, no acute distress.  Abd: Gravid, non-tender  See OB Vitals flowsheet.  ASSESSMENT/PLAN:  Fan was seen today for prenatal care.    Diagnoses and all orders for this visit:    Pregnancy, unspecified gestational age  -     blood glucose (NO BRAND SPECIFIED) test strip; Use to test blood sugar 4 times daily or as directed. To accompany: Blood Glucose Monitor Brands: per insurance.    Type 2 diabetes mellitus without complication, without long-term current use of insulin (H): Co-managed with MFM and CDE/endocrinology- reviewed note from today. On 6 units NPH- sugars improved. Fetal echo normal. Growth US showed normal interval growth- plan to start  surveillance with BPP's at 32 weeks.  -     blood glucose (NO BRAND SPECIFIED) test strip; Use to test blood sugar 4 times daily or as directed. To accompany: Blood Glucose Monitor Brands: per insurance.    Encounter for medication refill  -     albuterol (PROAIR HFA/PROVENTIL HFA/VENTOLIN HFA) 108 (90 Base) MCG/ACT inhaler; Inhale 2 puffs into the lungs every 6 hours as needed for shortness of breath, wheezing or cough.    Strain of muscle of pelvis: Offered PT- declined. Continue execises at home and will order maternity support belt  -     " OB/Maternity Back Brace for DME - ONLY FOR DME    Other orders  -     TDAP 7+ (ADACEL,BOOSTRIX)      -IUP at 30w0d:   Prenatal labs reviewed   RTC in 2 weeks or sooner with problems. Offer RSV next visit        Sully Barney MD

## 2024-10-30 NOTE — PATIENT INSTRUCTIONS
"PLAN:  *Continue NPH 6 units before bed  *Check sugar anytime you feel \"off\" Ex: sweaty, dizzy, shaky  *Check glucose fasting in the morning and one hour after meals    FOLLOW-UP:  *11/6 Telephone Visit with Gale at 11am  "

## 2024-11-04 ENCOUNTER — TELEPHONE (OUTPATIENT)
Dept: MATERNAL FETAL MEDICINE | Facility: HOSPITAL | Age: 29
End: 2024-11-04
Payer: COMMERCIAL

## 2024-11-04 LAB — SCANNED LAB RESULT: NORMAL

## 2024-11-04 NOTE — TELEPHONE ENCOUNTER
Called Fan and discussed low risk NIPT results.    Results indicate low risk for aneuploidy involving for chromosomes 21, 18, 13, or sex chromosomes (predicted fetal sex is male). This puts her current pregnancy at low risk for Down syndrome, trisomy 18, trisomy 13 and sex chromosome abnormalities.  The microdeletion analysis portion of her NIPT testing indicated a low risk for microdeletions on chromosome 22q11.2, 15q11.2, 1p36, 4p- and 5p-.  Although these results are reassuring, this does not replace a standard chromosome analysis or CGH array from an amniocentesis.    Plan:  Fan is scheduled for a follow-up ultrasound at Maternal Fetal Medicine on 11/19/2024. A copy of her NIPT results are available in [facility] for her primary OB to review.    Vy Moseley MS, [facility]  Maternal Fetal Medicine  [facility]  Phone:[phone]

## 2024-11-06 ENCOUNTER — VIRTUAL VISIT (OUTPATIENT)
Dept: EDUCATION SERVICES | Facility: CLINIC | Age: 29
End: 2024-11-06
Payer: COMMERCIAL

## 2024-11-06 DIAGNOSIS — O24.313 PRE-EXISTING DIABETES MELLITUS AFFECTING PREGNANCY IN THIRD TRIMESTER, ANTEPARTUM: Primary | ICD-10-CM

## 2024-11-06 PROCEDURE — 99207 PR NO BILLABLE SERVICE THIS VISIT: CPT | Mod: 93

## 2024-11-06 NOTE — NURSING NOTE
Current patient location: [address] [place] [address]    Is the patient currently in the state of [place]? YES    Visit mode:VIDEO    If the visit is dropped, the patient can be reconnected by: VIDEO VISIT: Text to cell phone:   Telephone Information:   Mobile [phone]       Will anyone else be joining the visit? NO  (If patient encounters technical issues they should call [phone]    Are changes needed to the allergy or medication list? No and Pt stated no med changes    Are refills needed on medications prescribed by this physician? NO    Rooming Documentation:  Not applicable    Reason for visit: RECHECK and Diabetes Education    Anabella COBURN

## 2024-11-06 NOTE — PROGRESS NOTES
"Virtual Visit Details    Type of service:  Telephone Visit   Phone call duration: 16 minutes   Originating Location (pt. Location): Home    Distant Location (provider location):  On-site      Diabetes Self-Management Training - Pregnancy Complicated by Diabetes    SUBJECTIVE:  Fan Duran presents today for  education related to Pregnancy complicated by diabetes  She is accompanied by self  Patient concerns: No concerns    LMP  (LMP Unknown)     Lab Results   Component Value Date    GLC 94 01/12/2024    GLC 89 08/11/2022    GLC 77 02/16/2022       History   Smoking Status    Never   Smokeless Tobacco    Never       SOCIO/ECONOMIC HISTORY:  Lives with: spouse and 3 children  Recent family changes/social stressors: none noted  Language(s) spoken at home: English    ASSESSMENT:  Due date 1/8/2025 (ultrasound) Previous pregnancies? Yes  (# full term pregnancies 3 )  Problems in past pregnancy: pre-diabetes in last pregnancy  Profession-stay at home  Prepregnancy weight 175#  Height: 4'11\"  Current weight 188#    Medical conditions: None   Medications: None   Past hospitalizations None  Special dietary considerations:None  Exercise habits: None    Problems in current pregnancy: T2DM (A1C=6.6 1/2024)    NUTRITION:  Breakfast: Coffee  Lunch: Rice, noodles, vegetables  Dinner 7-8pm: Skips 2-3 times per week; Rice, noodles, salad with egg   Overnight: Chicken, rice, pizza  Bedtime snack-12am: Banana, apple  Water throughout day    GLUCOSE: Accu Chek Guide: Fasting in morning and 1 hour after meals    Date Pre-B Post-B Pre-L Post-L Pre-D Post-D  HS/NOC   10/31 97 126  114  114    11/1 98 116  104  130    11/2 106 120  118  113    11/3 89 126  129  135    11/4 126 (forgot insulin) 119  127  106    11/5 86 113  89  86    11/6 106             Educational topics covered today:  Insulin-rotate sites, insulin resistance    ASSESSMENT  Pre-existing T2DM seen in pregnancy to review glucose. She is taking NPH 6 units before " bed. Currently 31 weeks, 0 day gestation. Overall, she is feeling well, baby boy active. Feels insulin has really helped glucose control. She is only injecting on left side of abdomen, reminded to rotate sites. Morning fasting 29% at goal, 1 hour post prandials 100% at goal. Average morning [id number].  She has not had any more hypoglycemic symptoms.Increase NPH to 8 units. Follow up in one week.    PLAN:  *Increase NPH 8 units before bed  *Check glucose fasting in the morning and one hour after meals    FOLLOW-UP:  * Telephone Visit with Gale at 3pm    Time spent was 16 minutes  Encounter type: Individual    Gale Murray RN, [facility]  Diabetes Education Department  [facility], [place]  Phone: [phone]  [phone]    Any diabetes medication dose changes were made via the CDE Protocol and Collaborative Practice Agreement with [facility].  A copy of this encounter was provided to patient's referring provider.

## 2024-11-06 NOTE — LETTER
"11/6/2024      Fan Duran  [address] [place] [address]      Dear Colleague,    Thank you for referring your patient, Fan Duran, to the [facility]. Please see a copy of my visit note below.    Virtual Visit Details    Type of service:  Telephone Visit   Phone call duration: 16 minutes   Originating Location (pt. Location): Home    Distant Location (provider location):  On-site      Diabetes Self-Management Training - Pregnancy Complicated by Diabetes    SUBJECTIVE:  Fan Duran presents today for  education related to Pregnancy complicated by diabetes  She is accompanied by self  Patient concerns: No concerns    LMP  (LMP Unknown)     Lab Results   Component Value Date    GLC 94 01/12/2024    GLC 89 08/11/2022    GLC 77 02/16/2022       History   Smoking Status     Never   Smokeless Tobacco     Never       SOCIO/ECONOMIC HISTORY:  Lives with: spouse and 3 children  Recent family changes/social stressors: none noted  Language(s) spoken at home: English    ASSESSMENT:  Due date 1/8/2025 (ultrasound) Previous pregnancies? Yes  (# full term pregnancies 3 )  Problems in past pregnancy: pre-diabetes in last pregnancy  Profession-stay at home  Prepregnancy weight 175#  Height: 4'11\"  Current weight 188#    Medical conditions: None   Medications: None   Past hospitalizations None  Special dietary considerations:None  Exercise habits: None    Problems in current pregnancy: T2DM (A1C=6.6 1/2024)    NUTRITION:  Breakfast: Coffee  Lunch: Rice, noodles, vegetables  Dinner 7-8pm: Skips 2-3 times per week; Rice, noodles, salad with egg   Overnight: Chicken, rice, pizza  Bedtime snack-12am: Banana, apple  Water throughout day    GLUCOSE: Accu Chek Guide: Fasting in morning and 1 hour after meals    Date Pre-B Post-B Pre-L Post-L Pre-D Post-D  HS/NOC   10/31 97 126  114  114    11/1 98 116  104  130    11/2 106 120  118  113    11/3 89 126  129  135    11/4 126 (forgot " insulin) 119  127  106     86 113  89  86     106             Educational topics covered today:  Insulin-rotate sites, insulin resistance    ASSESSMENT  Pre-existing T2DM seen in pregnancy to review glucose. She is taking NPH 6 units before bed. Currently 31 weeks, 0 day gestation. Overall, she is feeling well, baby boy active. Feels insulin has really helped glucose control. She is only injecting on left side of abdomen, reminded to rotate sites. Morning fasting 29% at goal, 1 hour post prandials 100% at goal. Average morning [id number].  She has not had any more hypoglycemic symptoms.Increase NPH to 8 units. Follow up in one week.    PLAN:  *Increase NPH 8 units before bed  *Check glucose fasting in the morning and one hour after meals    FOLLOW-UP:  * Telephone Visit with Gale at 3pm    Time spent was 16 minutes  Encounter type: Individual    Gale Murray RN, [facility] [place]  Diabetes Education Department  [facility]  Phone: [phone]  [phone]    Any diabetes medication dose changes were made via the CDE Protocol and Collaborative Practice Agreement with [facility].  A copy of this encounter was provided to patient's referring provider.      Again, thank you for allowing me to participate in the care of your patient.        Sincerely,        Gale Murray RN

## 2024-11-11 ENCOUNTER — PATIENT OUTREACH (OUTPATIENT)
Dept: CARE COORDINATION | Facility: CLINIC | Age: 29
End: 2024-11-11
Payer: COMMERCIAL

## 2024-11-11 NOTE — PROGRESS NOTES
FRW UPDATE :  11/11/24 - Patient received letter that application was received but pending. Phone number for [facility] [phone] given to patient to follow up if she doesn't hear back in the next couple of weeks.  FRW will follow up within 30 days.

## 2024-11-13 ENCOUNTER — PRENATAL OFFICE VISIT (OUTPATIENT)
Dept: FAMILY MEDICINE | Facility: CLINIC | Age: 29
End: 2024-11-13
Payer: COMMERCIAL

## 2024-11-13 VITALS
DIASTOLIC BLOOD PRESSURE: 67 MMHG | OXYGEN SATURATION: 97 % | BODY MASS INDEX: 37.66 KG/M2 | HEIGHT: 60 IN | HEART RATE: 96 BPM | SYSTOLIC BLOOD PRESSURE: 107 MMHG | RESPIRATION RATE: 16 BRPM | WEIGHT: 191.8 LBS | TEMPERATURE: 99.6 F

## 2024-11-13 DIAGNOSIS — E11.9 TYPE 2 DIABETES MELLITUS WITHOUT COMPLICATION, WITHOUT LONG-TERM CURRENT USE OF INSULIN (H): ICD-10-CM

## 2024-11-13 DIAGNOSIS — O09.90 HIGH-RISK PREGNANCY, UNSPECIFIED TRIMESTER: Primary | ICD-10-CM

## 2024-11-13 PROCEDURE — 99207 PR PRENATAL VISIT: CPT | Performed by: FAMILY MEDICINE

## 2024-11-13 NOTE — PROGRESS NOTES
"SUBJECTIVE:   at 32w0d. Estimated Date of Delivery: 2025.  She is doing well. She denies vaginal bleeding, leakage of fluid, or urinary symptoms. Fetal movement is present. She is not having contractions.  Concerns: increased to 8 units NPH- has CDE appointment tomorrow- fasting sugars improved but occasionally still elevated. Patient reports 2 elevations in fasting sugars over the last week. She did not sleep well the last few nights- she did check sugar overnight and it was 140's. No hypoglycemic episodes.   ROS  Negative except as noted above in HPI  OBJECTIVE:  Blood pressure 107/67, pulse 96, temperature 99.6  F (37.6  C), temperature source Oral, resp. rate 16, height 1.525 m (5' 0.04\"), weight 87 kg (191 lb 12.8 oz), SpO2 97%, not currently breastfeeding.  Gen: Comfortable, no acute distress.  Abd: Gravid, non-tender  See OB Vitals flowsheet.  ASSESSMENT/PLAN:  Fan was seen today for prenatal care.    Diagnoses and all orders for this visit:    High-risk pregnancy, unspecified trimester    Type 2 diabetes mellitus without complication, without long-term current use of insulin (H): Co-managed with diabetes education and MFM- start twice weekly BPP's- has appointment 24. On insulin 8 units NPH and fasting numbers have improved.       -IUP at 32w0d:   Prenatal labs reviewed   Breech presentation- if this persists, offer ECV vs primary .  Discussed RSV vaccine - considering this, would like to get next visit  RTC in 2 weeks or sooner with problems. RSV next visit      Sully Barney MD    "

## 2024-11-14 ENCOUNTER — VIRTUAL VISIT (OUTPATIENT)
Dept: EDUCATION SERVICES | Facility: CLINIC | Age: 29
End: 2024-11-14
Payer: COMMERCIAL

## 2024-11-14 DIAGNOSIS — O24.313 PRE-EXISTING DIABETES MELLITUS AFFECTING PREGNANCY IN THIRD TRIMESTER, ANTEPARTUM: Primary | ICD-10-CM

## 2024-11-14 NOTE — LETTER
"2024      Fan Duran  [address] [place] [address]      Dear Colleague,    Thank you for referring your patient, Fan Duran, to the [facility]. Please see a copy of my visit note below.    NO CHARGE. Visit was not completed.    Gale Murray RN, [facility]  Diabetes Education Department  [facility], [place]  Phone: [phone]  [phone]    Diabetes Self-Management Training - Pregnancy Complicated by Diabetes    SUBJECTIVE:  Fan Duran presents today for  education related to Pregnancy complicated by diabetes  She is accompanied by self  Patient concerns: No concerns    LMP  (LMP Unknown)     Lab Results   Component Value Date    GLC 94 2024    GLC 89 2022    GLC 77 2022       History   Smoking Status     Never   Smokeless Tobacco     Never       SOCIO/ECONOMIC HISTORY:  Lives with: spouse and 3 children  Recent family changes/social stressors: none noted  Language(s) spoken at home: English    ASSESSMENT:  Due date 2025 (ultrasound) Previous pregnancies? Yes  (# full term pregnancies 3 )  Problems in past pregnancy: pre-diabetes in last pregnancy  Profession-stay at home  Prepregnancy weight 175#  Height: 4'11\"  Current weight 188#    Medical conditions: None   Medications: None   Past hospitalizations None  Special dietary considerations:None  Exercise habits: None    Problems in current pregnancy: T2DM (A1C=6.6 2024)    NUTRITION:  Breakfast: Coffee  Lunch: Rice, noodles, vegetables  Dinner 7-8pm: Skips 2-3 times per week; Rice, noodles, salad with egg   Overnight: Chicken, rice, pizza  Bedtime snack-12am: Banana, apple  Water throughout day    GLUCOSE: Accu Chek Guide: Fasting in morning and 1 hour after meals    Educational topics covered today:  Insulin-rotate sites, insulin resistance    ASSESSMENT  Pre-existing T2DM seen in pregnancy to review glucose. She is taking NPH 8 units " before bed. Currently 32 weeks, 1 day gestation. Patient was not at home and did not have glucose data. I asked her to my chart me her blood sugars from the past week.     Time spent was: NO CHARGE  Encounter type: Individual    Gale Murray RN, [facility]  Diabetes Education Department  [facility]  Phone: [phone]  [phone]    Any diabetes medication dose changes were made via the CDE Protocol and Collaborative Practice Agreement with [facility].  A copy of this encounter was provided to patient's referring provider.      Again, thank you for allowing me to participate in the care of your patient.        Sincerely,        Gale Murray RN

## 2024-11-14 NOTE — PROGRESS NOTES
"NO CHARGE. Visit was not completed.    Gale Murray RN, [facility]  Diabetes Education Department  [facility], [place]  Phone: [phone]  [phone]    Diabetes Self-Management Training - Pregnancy Complicated by Diabetes    SUBJECTIVE:  Fan Duran presents today for  education related to Pregnancy complicated by diabetes  She is accompanied by self  Patient concerns: No concerns    LMP  (LMP Unknown)     Lab Results   Component Value Date    GLC 94 2024    GLC 89 2022    GLC 77 2022       History   Smoking Status    Never   Smokeless Tobacco    Never       SOCIO/ECONOMIC HISTORY:  Lives with: spouse and 3 children  Recent family changes/social stressors: none noted  Language(s) spoken at home: English    ASSESSMENT:  Due date 2025 (ultrasound) Previous pregnancies? Yes  (# full term pregnancies 3 )  Problems in past pregnancy: pre-diabetes in last pregnancy  Profession-stay at home  Prepregnancy weight 175#  Height: 4'11\"  Current weight 188#    Medical conditions: None   Medications: None   Past hospitalizations None  Special dietary considerations:None  Exercise habits: None    Problems in current pregnancy: T2DM (A1C=6.6 2024)    NUTRITION:  Breakfast: Coffee  Lunch: Rice, noodles, vegetables  Dinner 7-8pm: Skips 2-3 times per week; Rice, noodles, salad with egg   Overnight: Chicken, rice, pizza  Bedtime snack-12am: Banana, apple  Water throughout day    GLUCOSE: Accu Chek Guide: Fasting in morning and 1 hour after meals    Educational topics covered today:  Insulin-rotate sites, insulin resistance    ASSESSMENT  Pre-existing T2DM seen in pregnancy to review glucose. She is taking NPH 8 units before bed. Currently 32 weeks, 1 day gestation. Patient was not at home and did not have glucose data. I asked her to my chart me her blood sugars from the past week.     Time spent was: NO CHARGE  Encounter type: Individual    Gale Murray RN, " [facility]  Diabetes Education Department  [facility], [place]  Phone: [phone]  [phone]    Any diabetes medication dose changes were made via the CDE Protocol and Collaborative Practice Agreement with [facility].  A copy of this encounter was provided to patient's referring provider.

## 2024-11-19 ENCOUNTER — ANCILLARY PROCEDURE (OUTPATIENT)
Dept: ULTRASOUND IMAGING | Facility: HOSPITAL | Age: 29
End: 2024-11-19
Attending: OBSTETRICS & GYNECOLOGY
Payer: COMMERCIAL

## 2024-11-19 ENCOUNTER — OFFICE VISIT (OUTPATIENT)
Dept: MATERNAL FETAL MEDICINE | Facility: HOSPITAL | Age: 29
End: 2024-11-19
Attending: OBSTETRICS & GYNECOLOGY
Payer: COMMERCIAL

## 2024-11-19 DIAGNOSIS — O24.113 PREGNANCY WITH TYPE 2 DIABETES MELLITUS IN THIRD TRIMESTER: Primary | ICD-10-CM

## 2024-11-19 DIAGNOSIS — O24.112 TYPE 2 DIABETES MELLITUS IN PREGNANCY, SECOND TRIMESTER: ICD-10-CM

## 2024-11-19 PROCEDURE — 99207 PR NO CHARGE LOS: CPT | Performed by: OBSTETRICS & GYNECOLOGY

## 2024-11-19 PROCEDURE — 76816 OB US FOLLOW-UP PER FETUS: CPT

## 2024-11-19 PROCEDURE — 76816 OB US FOLLOW-UP PER FETUS: CPT | Mod: 26 | Performed by: OBSTETRICS & GYNECOLOGY

## 2024-11-19 PROCEDURE — 76819 FETAL BIOPHYS PROFIL W/O NST: CPT | Mod: 26 | Performed by: OBSTETRICS & GYNECOLOGY

## 2024-11-19 PROCEDURE — 76819 FETAL BIOPHYS PROFIL W/O NST: CPT

## 2024-11-19 NOTE — NURSING NOTE
Fan Duran is a  at 32w6d who presents to [facility] for a follow-up growth ultrasound and BPP. Pt reports positive fetal movement. Pt denies bldg/lof/change in discharge, contractions, headache, vision changes, chest pain/SOB or edema. SBAR given to Dr. Thayer, see note in Epic.      Juliane Garcia RN

## 2024-11-19 NOTE — PROGRESS NOTES
"Please see \"Imaging\" tab under \"Chart Review\" for details of today's visit.    Marvin Thayer    "

## 2024-11-20 ENCOUNTER — TELEPHONE (OUTPATIENT)
Dept: FAMILY MEDICINE | Facility: CLINIC | Age: 29
End: 2024-11-20
Payer: COMMERCIAL

## 2024-11-20 NOTE — TELEPHONE ENCOUNTER
Forms/Letter Request    Type of form/letter: OTHER: Requesting a doctors statement or OB visit to verify pregnancy.        Do we have the form/letter: Yes: Letter of request    Who is the form from? Dept [employer] Services (if other please explain)    Where did/will the form come from? Patient or family brought in       When is form/letter needed by: 11/28/2024     How would you like the form/letter returned: Fax : [phone]    Patient Notified form requests are processed in 5-7 business days:Yes    Could we send this information to you in [employer] or would you prefer to receive a phone call?:   Patient would prefer a phone call   Okay to leave a detailed message?: Yes at Cell number on file:    Telephone Information:   Mobile [phone]

## 2024-11-22 NOTE — TELEPHONE ENCOUNTER
CMA collected or printed forms from . Forms pre-filled for provider review, completion, and signature. Forms placed in provider's blue folder at  today. Thanks.     Attached 09/30/2024 office note.

## 2024-11-27 ENCOUNTER — PRENATAL OFFICE VISIT (OUTPATIENT)
Dept: FAMILY MEDICINE | Facility: CLINIC | Age: 29
End: 2024-11-27
Payer: COMMERCIAL

## 2024-11-27 VITALS
HEIGHT: 60 IN | OXYGEN SATURATION: 98 % | TEMPERATURE: 98.6 F | DIASTOLIC BLOOD PRESSURE: 72 MMHG | WEIGHT: 195.4 LBS | SYSTOLIC BLOOD PRESSURE: 110 MMHG | BODY MASS INDEX: 38.36 KG/M2 | HEART RATE: 101 BPM | RESPIRATION RATE: 16 BRPM

## 2024-11-27 DIAGNOSIS — Z34.90 PREGNANCY, UNSPECIFIED GESTATIONAL AGE: Primary | ICD-10-CM

## 2024-11-27 DIAGNOSIS — E11.9 TYPE 2 DIABETES MELLITUS WITHOUT COMPLICATION, WITHOUT LONG-TERM CURRENT USE OF INSULIN (H): ICD-10-CM

## 2024-11-27 DIAGNOSIS — G56.03 BILATERAL CARPAL TUNNEL SYNDROME: ICD-10-CM

## 2024-11-27 DIAGNOSIS — O24.313 PRE-EXISTING DIABETES MELLITUS AFFECTING PREGNANCY IN THIRD TRIMESTER, ANTEPARTUM: ICD-10-CM

## 2024-11-27 PROCEDURE — 99207 PR PRENATAL VISIT: CPT | Performed by: FAMILY MEDICINE

## 2024-11-27 NOTE — PROGRESS NOTES
"SUBJECTIVE:   at 34w0d. Estimated Date of Delivery: 2025.  She is doing well. She denies vaginal bleeding, leakage of fluid, or urinary symptoms. Fetal movement is present. She is not having contractions.  Concerns: Fasting BG still elevated- she will send results to CDE via [employer]. Open to induction earlier if needed. Noticed she had one episode of low blood sugar 70- symptomatic that was treated. She had one day of elevated sugars, likely related to outside stress dealing with her daughter.  ROS  Negative except as noted above in HPI  OBJECTIVE:  Blood pressure 110/72, pulse 101, temperature 98.6  F (37  C), temperature source Oral, resp. rate 16, height 1.525 m (5' 0.04\"), weight 88.6 kg (195 lb 6.4 oz), SpO2 98%, not currently breastfeeding.  Gen: Comfortable, no acute distress.  Abd: Gravid, non-tender  See OB Vitals flowsheet.  ASSESSMENT/PLAN:  Fan was seen today for prenatal care.    Diagnoses and all orders for this visit:    Pregnancy, unspecified gestational age  -     blood glucose (NO BRAND SPECIFIED) lancets standard; Use to test blood sugar 4 times daily or as directed.    Type 2 diabetes mellitus without complication, without long-term current use of insulin (H): Twice weekly  surveillance with MFM. Still with some elevated fasting BG- advised her to send all sugar readings to CDE via [employer] to adjust insulin. I did  on need for induction earlier if glycemic control is not adequate.   -     blood glucose (NO BRAND SPECIFIED) lancets standard; Use to test blood sugar 4 times daily or as directed.  -     blood glucose (ACCU-CHEK GUIDE) test strip; Use to test blood sugar 4 times daily or as directed.    Bilateral carpal tunnel syndrome: counseled on treatment.      -IUP at 34w0d:   Prenatal labs reviewed   RTC in 1 weeks or sooner with problems. RSV next visit        Sully Barney MD    "

## 2024-11-27 NOTE — TELEPHONE ENCOUNTER
Completed form received in today's blue folder. Successfully faxed to [phone]. A copy was mailed to patient as well

## 2024-11-29 ENCOUNTER — HOSPITAL ENCOUNTER (OUTPATIENT)
Dept: ULTRASOUND IMAGING | Facility: CLINIC | Age: 29
Discharge: HOME | End: 2024-11-29
Attending: OBSTETRICS & GYNECOLOGY | Admitting: OBSTETRICS & GYNECOLOGY
Payer: COMMERCIAL

## 2024-11-29 DIAGNOSIS — O24.113 PREGNANCY WITH TYPE 2 DIABETES MELLITUS IN THIRD TRIMESTER: ICD-10-CM

## 2024-11-29 PROCEDURE — 76819 FETAL BIOPHYS PROFIL W/O NST: CPT

## 2024-11-29 PROCEDURE — 76819 FETAL BIOPHYS PROFIL W/O NST: CPT | Mod: 26 | Performed by: OBSTETRICS & GYNECOLOGY

## 2024-12-03 ENCOUNTER — ANCILLARY PROCEDURE (OUTPATIENT)
Dept: ULTRASOUND IMAGING | Facility: HOSPITAL | Age: 29
End: 2024-12-03
Attending: OBSTETRICS & GYNECOLOGY
Payer: COMMERCIAL

## 2024-12-03 ENCOUNTER — OFFICE VISIT (OUTPATIENT)
Dept: MATERNAL FETAL MEDICINE | Facility: HOSPITAL | Age: 29
End: 2024-12-03
Attending: OBSTETRICS & GYNECOLOGY
Payer: COMMERCIAL

## 2024-12-03 DIAGNOSIS — O24.112 TYPE 2 DIABETES MELLITUS IN PREGNANCY, SECOND TRIMESTER: ICD-10-CM

## 2024-12-03 DIAGNOSIS — O24.113 PREGNANCY COMPLICATED BY PRE-EXISTING TYPE 2 DIABETES, THIRD TRIMESTER: Primary | ICD-10-CM

## 2024-12-03 PROCEDURE — 76819 FETAL BIOPHYS PROFIL W/O NST: CPT | Mod: 26 | Performed by: OBSTETRICS & GYNECOLOGY

## 2024-12-03 PROCEDURE — 99207 PR NO CHARGE LOS: CPT | Performed by: OBSTETRICS & GYNECOLOGY

## 2024-12-03 PROCEDURE — 76819 FETAL BIOPHYS PROFIL W/O NST: CPT

## 2024-12-03 NOTE — NURSING NOTE
Fan Duran is a  at 34w6d who presents to [facility] for a BPP. Reports some blood sugars continue to be elevated, has a ob visit planned tomorrow and will review her blood sugars with Dr. Barney. Pt reports positive fetal movement. Pt denies bldg/lof/change in discharge, contractions, headache, vision changes, chest pain/SOB or edema. SBAR given to Dr. Hawthorne, see note in Epic.       Juliane Garcia RN

## 2024-12-03 NOTE — PROGRESS NOTES
Please see the imaging tab for details of the ultrasound performed today.    Shabnam Hawthorne MD  Specialist in Maternal-Fetal Medicine

## 2024-12-04 ENCOUNTER — PRENATAL OFFICE VISIT (OUTPATIENT)
Dept: FAMILY MEDICINE | Facility: CLINIC | Age: 29
End: 2024-12-04
Payer: COMMERCIAL

## 2024-12-04 VITALS
TEMPERATURE: 98.3 F | OXYGEN SATURATION: 95 % | RESPIRATION RATE: 20 BRPM | HEIGHT: 60 IN | WEIGHT: 193.75 LBS | DIASTOLIC BLOOD PRESSURE: 74 MMHG | HEART RATE: 99 BPM | SYSTOLIC BLOOD PRESSURE: 118 MMHG | BODY MASS INDEX: 38.04 KG/M2

## 2024-12-04 DIAGNOSIS — E11.9 TYPE 2 DIABETES MELLITUS WITHOUT COMPLICATION, WITHOUT LONG-TERM CURRENT USE OF INSULIN (H): ICD-10-CM

## 2024-12-04 DIAGNOSIS — O09.90 HIGH-RISK PREGNANCY, UNSPECIFIED TRIMESTER: Primary | ICD-10-CM

## 2024-12-04 PROCEDURE — 99207 PR PRENATAL VISIT: CPT | Performed by: FAMILY MEDICINE

## 2024-12-04 PROCEDURE — 90678 RSV VACC PREF BIVALENT IM: CPT | Performed by: FAMILY MEDICINE

## 2024-12-04 PROCEDURE — 90471 IMMUNIZATION ADMIN: CPT | Performed by: FAMILY MEDICINE

## 2024-12-04 NOTE — PATIENT INSTRUCTIONS
Increase to 12 units insulin tomorrow    Call [phone] to schedule follow-up diabetes education appointment to review your blood sugars.

## 2024-12-04 NOTE — PROGRESS NOTES
"SUBJECTIVE:   at 35w0d. Estimated Date of Delivery: 2025.  She is doing well. She denies vaginal bleeding, leakage of fluid, or urinary symptoms. Fetal movement is present. She is not having contractions.  Concerns:     Fastin, 96, 109, 111, 128, 126, 107  1 hour after breakfast: 80, 100, 137, 112, 117, 104, 120, 133, 95, 101, 135  1 hour after lunch: 143, 128, 155, 129, 149, 121, 127, 92, 109, 153  1 hour after dinner: 138, 137, 163, 149, 137, 139, 141, 109, 83, 96, 145    Switched to insulin NPH 10 units 2 days ago (increased from 8 units)    She reports increased stressors with her daughter, and also not sleeping well    She tried to message CDE on [email or web address] but was unable    Reports some upper left chest pain- pain is worse when she twists and moves- thinks she slept awkwardly causing this pain    ROS  Negative except as noted above in HPI  OBJECTIVE:  Blood pressure 118/74, pulse 99, temperature 98.3  F (36.8  C), temperature source Oral, resp. rate 20, height 1.525 m (5' 0.04\"), weight 87.9 kg (193 lb 12 oz), SpO2 95%, not currently breastfeeding.  Gen: Comfortable, no acute distress.  Abd: Gravid, non-tender  See OB Vitals flowsheet.  ASSESSMENT/PLAN:  Fan was seen today for prenatal care, chest pain and back pain.    Diagnoses and all orders for this visit:    High-risk pregnancy, unspecified trimester    Type 2 diabetes mellitus without complication, without long-term current use of insulin (H): Co-managed with MFM- twice weekly BPP's. On ASA 81 mg daily. She continues to have elevated fasting BS despite increase from 8 to 10 unit NPH- will increase further to 12 units. I will send message to CDE- she does not yet have follow-up- gave her # to call to schedule.     Other orders  -     RSV VACCINE (ABRYSVO)      -IUP at 35w0d:   Prenatal labs reviewed   RTC in 1 weeks or sooner with problems. GBS next visit      Sully Barney MD    "

## 2024-12-06 ENCOUNTER — HOSPITAL ENCOUNTER (OUTPATIENT)
Dept: ULTRASOUND IMAGING | Facility: CLINIC | Age: 29
Discharge: HOME | End: 2024-12-06
Attending: OBSTETRICS & GYNECOLOGY
Payer: COMMERCIAL

## 2024-12-06 DIAGNOSIS — O24.113 PREGNANCY WITH TYPE 2 DIABETES MELLITUS IN THIRD TRIMESTER: ICD-10-CM

## 2024-12-06 PROCEDURE — 76819 FETAL BIOPHYS PROFIL W/O NST: CPT | Mod: 26 | Performed by: OBSTETRICS & GYNECOLOGY

## 2024-12-06 PROCEDURE — 76819 FETAL BIOPHYS PROFIL W/O NST: CPT

## 2024-12-09 ENCOUNTER — PATIENT OUTREACH (OUTPATIENT)
Dept: CARE COORDINATION | Facility: CLINIC | Age: 29
End: 2024-12-09
Payer: COMMERCIAL

## 2024-12-09 NOTE — PROGRESS NOTES
FRW UPDATE :  12/9/24 - Established patient outreach attempted x 1 was unable to reach. Left message on voicemail with call back information and requested return call.  Plan: Current outreach date reflects FRW 's follow up within 30 days.

## 2024-12-10 ENCOUNTER — OFFICE VISIT (OUTPATIENT)
Dept: MATERNAL FETAL MEDICINE | Facility: HOSPITAL | Age: 29
End: 2024-12-10
Attending: OBSTETRICS & GYNECOLOGY
Payer: COMMERCIAL

## 2024-12-10 ENCOUNTER — ANCILLARY PROCEDURE (OUTPATIENT)
Dept: ULTRASOUND IMAGING | Facility: HOSPITAL | Age: 29
End: 2024-12-10
Attending: OBSTETRICS & GYNECOLOGY
Payer: COMMERCIAL

## 2024-12-10 DIAGNOSIS — O24.112 TYPE 2 DIABETES MELLITUS IN PREGNANCY, SECOND TRIMESTER: ICD-10-CM

## 2024-12-10 DIAGNOSIS — O24.113 PREGNANCY WITH TYPE 2 DIABETES MELLITUS IN THIRD TRIMESTER: Primary | ICD-10-CM

## 2024-12-10 PROCEDURE — 76819 FETAL BIOPHYS PROFIL W/O NST: CPT | Mod: 26 | Performed by: OBSTETRICS & GYNECOLOGY

## 2024-12-10 PROCEDURE — 76819 FETAL BIOPHYS PROFIL W/O NST: CPT

## 2024-12-10 PROCEDURE — 99207 PR NO CHARGE LOS: CPT | Performed by: OBSTETRICS & GYNECOLOGY

## 2024-12-10 NOTE — NURSING NOTE
Fan Duran is a  at 35w6d who presents to [facility] for scheduled twice weekly biophysical profile. Pt reports positive fetal movement. Pt denies bldg/lof/change in discharge, contractions, headache, vision changes, chest pain/SOB or edema. SBAR given to Dr. Hawthorne, see note in Epic.

## 2024-12-11 ENCOUNTER — PRENATAL OFFICE VISIT (OUTPATIENT)
Dept: FAMILY MEDICINE | Facility: CLINIC | Age: 29
End: 2024-12-11
Payer: COMMERCIAL

## 2024-12-11 VITALS
TEMPERATURE: 98.1 F | OXYGEN SATURATION: 98 % | SYSTOLIC BLOOD PRESSURE: 115 MMHG | BODY MASS INDEX: 38.64 KG/M2 | HEIGHT: 60 IN | HEART RATE: 102 BPM | DIASTOLIC BLOOD PRESSURE: 72 MMHG | WEIGHT: 196.8 LBS | RESPIRATION RATE: 16 BRPM

## 2024-12-11 DIAGNOSIS — R10.11 RUQ ABDOMINAL PAIN: Primary | ICD-10-CM

## 2024-12-11 DIAGNOSIS — O09.90 HIGH-RISK PREGNANCY, UNSPECIFIED TRIMESTER: ICD-10-CM

## 2024-12-11 DIAGNOSIS — E11.9 TYPE 2 DIABETES MELLITUS WITHOUT COMPLICATION, WITHOUT LONG-TERM CURRENT USE OF INSULIN (H): ICD-10-CM

## 2024-12-11 DIAGNOSIS — E66.811 CLASS 1 OBESITY WITHOUT SERIOUS COMORBIDITY IN ADULT, UNSPECIFIED BMI, UNSPECIFIED OBESITY TYPE: ICD-10-CM

## 2024-12-11 DIAGNOSIS — K21.00 GASTROESOPHAGEAL REFLUX DISEASE WITH ESOPHAGITIS WITHOUT HEMORRHAGE: ICD-10-CM

## 2024-12-11 LAB
ERYTHROCYTE [DISTWIDTH] IN BLOOD BY AUTOMATED COUNT: 13.1 % (ref 10–15)
HCT VFR BLD AUTO: 34.9 % (ref 35–47)
HGB BLD-MCNC: 11.8 G/DL (ref 11.7–15.7)
MCH RBC QN AUTO: 28.4 PG (ref 26.5–33)
MCHC RBC AUTO-ENTMCNC: 33.8 G/DL (ref 31.5–36.5)
MCV RBC AUTO: 84 FL (ref 78–100)
PLATELET # BLD AUTO: 225 10E3/UL (ref 150–450)
RBC # BLD AUTO: 4.16 10E6/UL (ref 3.8–5.2)
WBC # BLD AUTO: 8.6 10E3/UL (ref 4–11)

## 2024-12-11 PROCEDURE — 87653 STREP B DNA AMP PROBE: CPT | Performed by: FAMILY MEDICINE

## 2024-12-11 PROCEDURE — 85027 COMPLETE CBC AUTOMATED: CPT | Performed by: FAMILY MEDICINE

## 2024-12-11 PROCEDURE — 82565 ASSAY OF CREATININE: CPT | Performed by: FAMILY MEDICINE

## 2024-12-11 PROCEDURE — 36415 COLL VENOUS BLD VENIPUNCTURE: CPT | Performed by: FAMILY MEDICINE

## 2024-12-11 PROCEDURE — 84460 ALANINE AMINO (ALT) (SGPT): CPT | Performed by: FAMILY MEDICINE

## 2024-12-11 PROCEDURE — 99207 PR PRENATAL VISIT: CPT | Performed by: FAMILY MEDICINE

## 2024-12-11 PROCEDURE — 99213 OFFICE O/P EST LOW 20 MIN: CPT | Mod: 25 | Performed by: FAMILY MEDICINE

## 2024-12-11 PROCEDURE — 84450 TRANSFERASE (AST) (SGOT): CPT | Performed by: FAMILY MEDICINE

## 2024-12-11 RX ORDER — FAMOTIDINE 20 MG/1
20 TABLET, FILM COATED ORAL 2 TIMES DAILY PRN
Qty: 60 TABLET | Refills: 1 | Status: ON HOLD | OUTPATIENT
Start: 2024-12-11 | End: 2024-12-18

## 2024-12-11 RX ORDER — CALCIUM CARBONATE 500 MG/1
1 TABLET, CHEWABLE ORAL 3 TIMES DAILY PRN
Qty: 100 TABLET | Refills: 0 | Status: ON HOLD | OUTPATIENT
Start: 2024-12-11

## 2024-12-11 NOTE — PROGRESS NOTES
"SUBJECTIVE:  [id number]  at 36w0d by {OB DATIN}. Estimated Date of Delivery: 2025.  She is doing well. She denies vaginal bleeding, leakage of fluid, or urinary symptoms. Fetal movement is {DESC; PRESENT/ABSENT:57449}. She {is/is not:} having contractions.  Concerns:   Fastin, 108, 111, 97, 124, 122  1 hour postprandials: 133, 121, 166, 160, 149, 138, 130, 150    Insulin NPH: 18 units for 2 days  ROS  Negative except as noted above in HPI  OBJECTIVE:  Blood pressure 115/72, pulse 102, temperature 98.1  F (36.7  C), temperature source Oral, resp. rate 16, height 1.525 m (5' 0.04\"), weight 89.3 kg (196 lb 12.8 oz), SpO2 98%, not currently breastfeeding.  Gen: Comfortable, no acute distress.  Abd: Gravid, non-tender  See OB Vitals flowsheet.  ASSESSMENT/PLAN:  Fan was seen today for prenatal care.    Diagnoses and all orders for this visit:    RUQ abdominal pain  -     Creatinine; Future  -     AST; Future  -     ALT; Future  -     CBC with platelets; Future      -IUP at 36w0d:   Prenatal labs reviewed and {NORMAL / ABNORMAL (RESULT):20698}.   Quad screen done; results came back normal. ***   Fetal survey was done at *** weeks and was normal with placenta located ***.   GBS status is {POSITIVE/NEGATIVE:79970}.   Peripartum Anesthesia: the patient {DOES/DOES NOT:94090} desire an epidural during labor.   Post-partum Contraception: {CONTRACEPTION:706}   Breast/Bottle: {FEEDING METHOD INFANT:9031}   Reviewed plans for getting to the hospital ***.  Reviewed skin to skin.  Reviewed baby meds in hospital and 24 hour testing, delayed cord clamping.    Discussed [facility]. She is *** interested.  RTC in *** weeks or sooner with problems.    Visit completed along with assistance of *** .    Sully Barney MD    " need to consider induction if not achieving adequate glycemic control.    High-risk pregnancy, unspecified trimester  -     Group B strep PCR      -IUP at 36w0d:   Prenatal labs reviewed   RTC in 1 weeks or sooner with problems.        Sully Barney MD

## 2024-12-12 LAB
ALT SERPL W P-5'-P-CCNC: 12 U/L (ref 0–50)
AST SERPL W P-5'-P-CCNC: 21 U/L (ref 0–45)
CREAT SERPL-MCNC: 0.52 MG/DL (ref 0.51–0.95)
EGFRCR SERPLBLD CKD-EPI 2021: >90 ML/MIN/1.73M2
GP B STREP DNA SPEC QL NAA+PROBE: NEGATIVE

## 2024-12-13 ENCOUNTER — ANCILLARY PROCEDURE (OUTPATIENT)
Dept: ULTRASOUND IMAGING | Facility: HOSPITAL | Age: 29
End: 2024-12-13
Attending: OBSTETRICS & GYNECOLOGY
Payer: COMMERCIAL

## 2024-12-13 DIAGNOSIS — O24.113 PREGNANCY WITH TYPE 2 DIABETES MELLITUS IN THIRD TRIMESTER: ICD-10-CM

## 2024-12-13 PROCEDURE — 76819 FETAL BIOPHYS PROFIL W/O NST: CPT

## 2024-12-13 PROCEDURE — 76819 FETAL BIOPHYS PROFIL W/O NST: CPT | Mod: 26 | Performed by: STUDENT IN AN ORGANIZED HEALTH CARE EDUCATION/TRAINING PROGRAM

## 2024-12-16 ENCOUNTER — VIRTUAL VISIT (OUTPATIENT)
Dept: EDUCATION SERVICES | Facility: CLINIC | Age: 29
End: 2024-12-16
Payer: COMMERCIAL

## 2024-12-16 VITALS — HEIGHT: 64 IN | WEIGHT: 196 LBS | BODY MASS INDEX: 33.46 KG/M2

## 2024-12-16 DIAGNOSIS — O24.313 PRE-EXISTING DIABETES MELLITUS AFFECTING PREGNANCY IN THIRD TRIMESTER, ANTEPARTUM: Primary | ICD-10-CM

## 2024-12-16 PROCEDURE — G0108 DIAB MANAGE TRN  PER INDIV: HCPCS | Mod: AE

## 2024-12-16 ASSESSMENT — PAIN SCALES - GENERAL: PAINLEVEL_OUTOF10: NO PAIN (0)

## 2024-12-16 NOTE — NURSING NOTE
Current patient location: [address] [place] [address]    Is the patient currently in the state of [place]? YES    Visit mode:TELEPHONE    If the visit is dropped, the patient can be reconnected by:TELEPHONE VISIT: Phone number:   Telephone Information:   Mobile [phone]       Will anyone else be joining the visit? NO  (If patient encounters technical issues they should call [phone])    Are changes needed to the allergy or medication list? No    Are refills needed on medications prescribed by this physician? NO    Rooming Documentation:  Patient declined to complete questionnaire(s)    Reason for visit: Diabetes Education    Mee COBURN

## 2024-12-16 NOTE — PROGRESS NOTES
"Virtual Visit Details    Type of service:  Telephone Visit   Phone call duration: 33 minutes   Originating Location (pt. Location): Home    Distant Location (provider location):  Off-site    Diabetes Self-Management Training - Pregnancy Complicated by Diabetes    SUBJECTIVE:  Fan Duran presents today for  education related to Pregnancy complicated by diabetes  She is accompanied by self  Patient concerns: No concerns    Ht 1.626 m (5' 4\")   Wt 88.9 kg (196 lb)   LMP  (LMP Unknown)   BMI 33.64 kg/m      Lab Results   Component Value Date    GLC 94 01/12/2024    GLC 89 08/11/2022    GLC 77 02/16/2022       History   Smoking Status    Never   Smokeless Tobacco    Never       SOCIO/ECONOMIC HISTORY:  Lives with: spouse and 3 children  Recent family changes/social stressors: none noted  Language(s) spoken at home: English    ASSESSMENT:  Due date 1/8/2025 (ultrasound) Previous pregnancies? Yes  (# full term pregnancies 3 )  Problems in past pregnancy: pre-diabetes in last pregnancy  Profession-stay at home  Prepregnancy weight 175#  Height: 4'11\"  Current weight 188#    Medical conditions: None   Medications: None   Past hospitalizations None  Special dietary considerations:None  Exercise habits: None    Problems in current pregnancy: T2DM (A1C=6.6 1/2024)    NUTRITION:  Breakfast: Coffee  Lunch: Rice, noodles, vegetables  Dinner 7-8pm: Skips 2-3 times per week; Rice, noodles, salad with egg   Overnight: Chicken, rice, pizza  Bedtime snack-12am: Banana, apple  Water throughout day    GLUCOSE: Accu Chek Guide: Fasting in morning and 1 hour after meals  Date Pre-B Post-B Pre-L Post-L Pre-D Post-D  HS/NOC   12/10 122   150  153    12/11 113 117  110  99    12/12 70 112  120  140 110   12/13 103 123     147   12/14 89     87 124   12/15 112 111  99  130    12/16 117 147            Educational topics covered today:  Insulin-rotate sites, hospital care, post delivery BG goals    ASSESSMENT  Pre-existing T2DM seen " in pregnancy to review glucose. She is taking NPH 18 units before bed. Noticing more back and abdominal pain, fatigue. Currently 36 weeks, 5 day gestation. Baby boy active. OB this week, possibly schedule induction. Is still trying to take short walks after meals despite pain. Appetite is still suppressed. Due to lack of energy she is worried she won't be able to deliver naturally and is aware  increases risks for complications. She will discuss concerns with OB this week. Morning fasting 29% at goal with average of 104. Post prandials primarily at goal. Reports injections in abdomen is causing pain. Advised to inject on the underside of arms. Increase NPH to 20 units. Follow up in one week.     PLAN:  *Increase NPH to 20 units  *Use underside of upper arms to inject insulin (the fatty part)  *Have Dr. Barney reorder NPH if not inducing this week  *2 weeks after delivery, start checking fasting morning blood sugars 3-4 times per week. That goal is <100  *Tentative Telephone follow up  at 2:30pm with Gale    Time spent: 33 minutes  Encounter type: Individual    Gale Murray RN, [facility]  Diabetes Education Department  [facility] [place]    Any diabetes medication dose changes were made via the CDE Protocol and Collaborative Practice Agreement with [facility].  A copy of this encounter was provided to patient's referring provider-Ector.

## 2024-12-16 NOTE — PATIENT INSTRUCTIONS
PLAN:  *Increase NPH to 20 units  *Use underside of upper arms to inject insulin (the fatty part)  *Have Dr. Barney reorder NPH if not inducing this week  *2 weeks after delivery, start checking fasting morning blood sugars 3-4 times per week. That goal is <100  *Tentative Telephone follow up 12/23 at 2:30pm with Gale

## 2024-12-16 NOTE — PROGRESS NOTES
"Virtual Visit Details    Type of service:  Telephone Visit   Phone call duration: *** minutes   Originating Location (pt. Location): {patient location:694914::\"Home\"}  {PROVIDER LOCATION On-site should be selected for visits conducted from your clinic location or adjoining [facility] hospital, academic office, or other nearby [facility] building. Off-site should be selected for all other provider locations, including home:050185}  Distant Location (provider location):  {virtual location provider:050324}  "

## 2024-12-17 ENCOUNTER — ANCILLARY PROCEDURE (OUTPATIENT)
Dept: ULTRASOUND IMAGING | Facility: HOSPITAL | Age: 29
End: 2024-12-17
Attending: STUDENT IN AN ORGANIZED HEALTH CARE EDUCATION/TRAINING PROGRAM
Payer: COMMERCIAL

## 2024-12-17 ENCOUNTER — OFFICE VISIT (OUTPATIENT)
Dept: MATERNAL FETAL MEDICINE | Facility: HOSPITAL | Age: 29
End: 2024-12-17
Attending: STUDENT IN AN ORGANIZED HEALTH CARE EDUCATION/TRAINING PROGRAM
Payer: COMMERCIAL

## 2024-12-17 DIAGNOSIS — O24.113 PREGNANCY WITH TYPE 2 DIABETES MELLITUS IN THIRD TRIMESTER: Primary | ICD-10-CM

## 2024-12-17 DIAGNOSIS — O24.113 PREGNANCY WITH TYPE 2 DIABETES MELLITUS IN THIRD TRIMESTER: ICD-10-CM

## 2024-12-17 PROCEDURE — 76816 OB US FOLLOW-UP PER FETUS: CPT | Mod: 26 | Performed by: STUDENT IN AN ORGANIZED HEALTH CARE EDUCATION/TRAINING PROGRAM

## 2024-12-17 PROCEDURE — 76819 FETAL BIOPHYS PROFIL W/O NST: CPT | Mod: 26 | Performed by: STUDENT IN AN ORGANIZED HEALTH CARE EDUCATION/TRAINING PROGRAM

## 2024-12-17 PROCEDURE — 76816 OB US FOLLOW-UP PER FETUS: CPT

## 2024-12-17 PROCEDURE — 99207 PR NO CHARGE LOS: CPT | Performed by: STUDENT IN AN ORGANIZED HEALTH CARE EDUCATION/TRAINING PROGRAM

## 2024-12-17 PROCEDURE — 76819 FETAL BIOPHYS PROFIL W/O NST: CPT

## 2024-12-17 NOTE — PROGRESS NOTES
The patient was seen for an [facility] in [facility] today.  For a detailed report of the ultrasound examination, please see the ultrasound report which can be found under the imaging tab.    If you have questions regarding today's evaluation or if we can be of further service, please contact the [facility].    Elif Thrasher M.D.  Maternal Fetal-Medicine Specialist

## 2024-12-17 NOTE — NURSING NOTE
Fan Duran is a  at 36w6d who presents to [facility] for follow up growth ultrasound and BPP. Pt reports positive fetal movement. Pt denies bldg/lof/change in discharge, contractions, headache, vision changes, chest pain/SOB or edema. SBAR given to Dr. Thrasher, see note in Epic.

## 2024-12-18 ENCOUNTER — HOSPITAL ENCOUNTER (INPATIENT)
Facility: CLINIC | Age: 29
End: 2024-12-18
Attending: FAMILY MEDICINE | Admitting: FAMILY MEDICINE
Payer: COMMERCIAL

## 2024-12-18 ENCOUNTER — PRENATAL OFFICE VISIT (OUTPATIENT)
Dept: FAMILY MEDICINE | Facility: CLINIC | Age: 29
End: 2024-12-18
Payer: COMMERCIAL

## 2024-12-18 VITALS
BODY MASS INDEX: 33.8 KG/M2 | TEMPERATURE: 98.8 F | WEIGHT: 198 LBS | HEIGHT: 64 IN | RESPIRATION RATE: 16 BRPM | OXYGEN SATURATION: 98 % | DIASTOLIC BLOOD PRESSURE: 82 MMHG | SYSTOLIC BLOOD PRESSURE: 123 MMHG | HEART RATE: 107 BPM

## 2024-12-18 DIAGNOSIS — O09.90 HIGH-RISK PREGNANCY, UNSPECIFIED TRIMESTER: Primary | ICD-10-CM

## 2024-12-18 DIAGNOSIS — Z34.90 PREGNANCY, UNSPECIFIED GESTATIONAL AGE: ICD-10-CM

## 2024-12-18 DIAGNOSIS — E11.9 TYPE 2 DIABETES MELLITUS WITHOUT COMPLICATION, WITHOUT LONG-TERM CURRENT USE OF INSULIN (H): ICD-10-CM

## 2024-12-18 DIAGNOSIS — E66.811 CLASS 1 OBESITY WITHOUT SERIOUS COMORBIDITY IN ADULT, UNSPECIFIED BMI, UNSPECIFIED OBESITY TYPE: ICD-10-CM

## 2024-12-18 PROBLEM — O24.414 INSULIN CONTROLLED GESTATIONAL DIABETES MELLITUS (GDM) IN THIRD TRIMESTER: Status: ACTIVE | Noted: 2022-10-05

## 2024-12-18 LAB
ABO + RH BLD: NORMAL
BLD GP AB SCN SERPL QL: NEGATIVE
EST. AVERAGE GLUCOSE BLD GHB EST-MCNC: 143 MG/DL
GLUCOSE BLDC GLUCOMTR-MCNC: 121 MG/DL (ref 70–99)
HBA1C MFR BLD: 6.6 %
HGB BLD-MCNC: 11.9 G/DL (ref 11.7–15.7)
SPECIMEN EXP DATE BLD: NORMAL

## 2024-12-18 PROCEDURE — 250N000012 HC RX MED GY IP 250 OP 636 PS 637: Performed by: FAMILY MEDICINE

## 2024-12-18 PROCEDURE — 120N000001 HC R&B MED SURG/OB

## 2024-12-18 PROCEDURE — 82962 GLUCOSE BLOOD TEST: CPT

## 2024-12-18 PROCEDURE — 85018 HEMOGLOBIN: CPT | Performed by: FAMILY MEDICINE

## 2024-12-18 PROCEDURE — 250N000013 HC RX MED GY IP 250 OP 250 PS 637: Performed by: FAMILY MEDICINE

## 2024-12-18 PROCEDURE — 83036 HEMOGLOBIN GLYCOSYLATED A1C: CPT | Performed by: FAMILY MEDICINE

## 2024-12-18 PROCEDURE — 86900 BLOOD TYPING SEROLOGIC ABO: CPT | Performed by: FAMILY MEDICINE

## 2024-12-18 PROCEDURE — 86780 TREPONEMA PALLIDUM: CPT | Performed by: FAMILY MEDICINE

## 2024-12-18 RX ORDER — PROCHLORPERAZINE MALEATE 10 MG
10 TABLET ORAL EVERY 6 HOURS PRN
Status: DISCONTINUED | OUTPATIENT
Start: 2024-12-18 | End: 2024-12-20 | Stop reason: HOSPADM

## 2024-12-18 RX ORDER — MISOPROSTOL 100 UG/1
25 TABLET ORAL
Status: DISCONTINUED | OUTPATIENT
Start: 2024-12-18 | End: 2024-12-20 | Stop reason: HOSPADM

## 2024-12-18 RX ORDER — TRANEXAMIC ACID 10 MG/ML
1 INJECTION, SOLUTION INTRAVENOUS EVERY 30 MIN PRN
Status: DISCONTINUED | OUTPATIENT
Start: 2024-12-18 | End: 2024-12-20 | Stop reason: HOSPADM

## 2024-12-18 RX ORDER — OXYTOCIN 10 [USP'U]/ML
10 INJECTION, SOLUTION INTRAMUSCULAR; INTRAVENOUS
Status: DISCONTINUED | OUTPATIENT
Start: 2024-12-18 | End: 2024-12-20 | Stop reason: HOSPADM

## 2024-12-18 RX ORDER — IBUPROFEN 800 MG/1
800 TABLET, FILM COATED ORAL
Status: COMPLETED | OUTPATIENT
Start: 2024-12-18 | End: 2024-12-20

## 2024-12-18 RX ORDER — ONDANSETRON 2 MG/ML
4 INJECTION INTRAMUSCULAR; INTRAVENOUS EVERY 6 HOURS PRN
Status: DISCONTINUED | OUTPATIENT
Start: 2024-12-18 | End: 2024-12-20 | Stop reason: HOSPADM

## 2024-12-18 RX ORDER — OXYTOCIN 10 [USP'U]/ML
10 INJECTION, SOLUTION INTRAMUSCULAR; INTRAVENOUS
Status: DISCONTINUED | OUTPATIENT
Start: 2024-12-18 | End: 2024-12-21 | Stop reason: HOSPADM

## 2024-12-18 RX ORDER — HUMAN INSULIN 100 [IU]/ML
20 INJECTION, SUSPENSION SUBCUTANEOUS AT BEDTIME
Status: ON HOLD
Start: 2024-12-18

## 2024-12-18 RX ORDER — OXYTOCIN/0.9 % SODIUM CHLORIDE 30/500 ML
340 PLASTIC BAG, INJECTION (ML) INTRAVENOUS CONTINUOUS PRN
Status: DISCONTINUED | OUTPATIENT
Start: 2024-12-18 | End: 2024-12-20 | Stop reason: HOSPADM

## 2024-12-18 RX ORDER — METOCLOPRAMIDE 10 MG/1
10 TABLET ORAL EVERY 6 HOURS PRN
Status: DISCONTINUED | OUTPATIENT
Start: 2024-12-18 | End: 2024-12-20 | Stop reason: HOSPADM

## 2024-12-18 RX ORDER — MORPHINE SULFATE 10 MG/ML
10 INJECTION, SOLUTION INTRAMUSCULAR; INTRAVENOUS
Status: COMPLETED | OUTPATIENT
Start: 2024-12-18 | End: 2024-12-19

## 2024-12-18 RX ORDER — OXYTOCIN/0.9 % SODIUM CHLORIDE 30/500 ML
100-340 PLASTIC BAG, INJECTION (ML) INTRAVENOUS CONTINUOUS PRN
Status: DISCONTINUED | OUTPATIENT
Start: 2024-12-18 | End: 2024-12-21 | Stop reason: HOSPADM

## 2024-12-18 RX ORDER — NALOXONE HYDROCHLORIDE 0.4 MG/ML
0.4 INJECTION, SOLUTION INTRAMUSCULAR; INTRAVENOUS; SUBCUTANEOUS
Status: DISCONTINUED | OUTPATIENT
Start: 2024-12-18 | End: 2024-12-20 | Stop reason: HOSPADM

## 2024-12-18 RX ORDER — NICOTINE POLACRILEX 4 MG
15-30 LOZENGE BUCCAL
Status: DISCONTINUED | OUTPATIENT
Start: 2024-12-18 | End: 2024-12-20 | Stop reason: HOSPADM

## 2024-12-18 RX ORDER — MISOPROSTOL 200 UG/1
800 TABLET ORAL
Status: DISCONTINUED | OUTPATIENT
Start: 2024-12-18 | End: 2024-12-20 | Stop reason: HOSPADM

## 2024-12-18 RX ORDER — HYDROXYZINE HYDROCHLORIDE 25 MG/1
50 TABLET, FILM COATED ORAL
Status: DISCONTINUED | OUTPATIENT
Start: 2024-12-18 | End: 2024-12-19

## 2024-12-18 RX ORDER — DEXTROSE MONOHYDRATE 25 G/50ML
25-50 INJECTION, SOLUTION INTRAVENOUS
Status: DISCONTINUED | OUTPATIENT
Start: 2024-12-18 | End: 2024-12-20 | Stop reason: HOSPADM

## 2024-12-18 RX ORDER — KETOROLAC TROMETHAMINE 30 MG/ML
30 INJECTION, SOLUTION INTRAMUSCULAR; INTRAVENOUS
Status: COMPLETED | OUTPATIENT
Start: 2024-12-18 | End: 2024-12-20

## 2024-12-18 RX ORDER — ONDANSETRON 4 MG/1
4 TABLET, ORALLY DISINTEGRATING ORAL EVERY 6 HOURS PRN
Status: DISCONTINUED | OUTPATIENT
Start: 2024-12-18 | End: 2024-12-20 | Stop reason: HOSPADM

## 2024-12-18 RX ORDER — LOPERAMIDE HYDROCHLORIDE 2 MG/1
4 CAPSULE ORAL
Status: DISCONTINUED | OUTPATIENT
Start: 2024-12-18 | End: 2024-12-20 | Stop reason: HOSPADM

## 2024-12-18 RX ORDER — MISOPROSTOL 200 UG/1
400 TABLET ORAL
Status: DISCONTINUED | OUTPATIENT
Start: 2024-12-18 | End: 2024-12-20 | Stop reason: HOSPADM

## 2024-12-18 RX ORDER — FENTANYL CITRATE 50 UG/ML
100 INJECTION, SOLUTION INTRAMUSCULAR; INTRAVENOUS
Status: DISCONTINUED | OUTPATIENT
Start: 2024-12-18 | End: 2024-12-20 | Stop reason: HOSPADM

## 2024-12-18 RX ORDER — METOCLOPRAMIDE HYDROCHLORIDE 5 MG/ML
10 INJECTION INTRAMUSCULAR; INTRAVENOUS EVERY 6 HOURS PRN
Status: DISCONTINUED | OUTPATIENT
Start: 2024-12-18 | End: 2024-12-20 | Stop reason: HOSPADM

## 2024-12-18 RX ORDER — LOPERAMIDE HYDROCHLORIDE 2 MG/1
2 CAPSULE ORAL
Status: DISCONTINUED | OUTPATIENT
Start: 2024-12-18 | End: 2024-12-20 | Stop reason: HOSPADM

## 2024-12-18 RX ORDER — CITRIC ACID/SODIUM CITRATE 334-500MG
30 SOLUTION, ORAL ORAL
Status: DISCONTINUED | OUTPATIENT
Start: 2024-12-18 | End: 2024-12-20 | Stop reason: HOSPADM

## 2024-12-18 RX ORDER — METHYLERGONOVINE MALEATE 0.2 MG/ML
200 INJECTION INTRAVENOUS
Status: DISCONTINUED | OUTPATIENT
Start: 2024-12-18 | End: 2024-12-20 | Stop reason: HOSPADM

## 2024-12-18 RX ORDER — CARBOPROST TROMETHAMINE 250 UG/ML
250 INJECTION, SOLUTION INTRAMUSCULAR
Status: DISCONTINUED | OUTPATIENT
Start: 2024-12-18 | End: 2024-12-20 | Stop reason: HOSPADM

## 2024-12-18 RX ORDER — NALOXONE HYDROCHLORIDE 0.4 MG/ML
0.2 INJECTION, SOLUTION INTRAMUSCULAR; INTRAVENOUS; SUBCUTANEOUS
Status: DISCONTINUED | OUTPATIENT
Start: 2024-12-18 | End: 2024-12-20 | Stop reason: HOSPADM

## 2024-12-18 RX ORDER — FAMOTIDINE 20 MG/1
20 TABLET, FILM COATED ORAL 2 TIMES DAILY PRN
COMMUNITY

## 2024-12-18 RX ADMIN — MISOPROSTOL 25 MCG: 100 TABLET ORAL at 22:30

## 2024-12-18 RX ADMIN — INSULIN HUMAN 20 UNITS: 100 INJECTION, SUSPENSION SUBCUTANEOUS at 22:30

## 2024-12-18 RX ADMIN — MISOPROSTOL 25 MCG: 100 TABLET ORAL at 20:25

## 2024-12-18 ASSESSMENT — ACTIVITIES OF DAILY LIVING (ADL)
ADLS_ACUITY_SCORE: 18

## 2024-12-18 NOTE — PROGRESS NOTES
"SUBJECTIVE:   at 37w0d. Estimated Date of Delivery: 2025.  She is doing well. She denies vaginal bleeding, leakage of fluid, or urinary symptoms. Fetal movement is present. She is not having contractions.  Concerns: forgot to take her insulin last night. Fasting BG this morning at goal. Post-prandial breakfast was at goal. Did not bring in her meter, but she had visit with CDE 2 days ago and insulin NPH was increased to 20 units.  ROS  Negative except as noted above in HPI  OBJECTIVE:  Blood pressure 123/82, pulse 107, temperature 98.8  F (37.1  C), temperature source Oral, resp. rate 16, height 1.626 m (5' 4\"), weight 89.8 kg (198 lb), SpO2 98%, not currently breastfeeding.  Gen: Comfortable, no acute distress.  Abd: Gravid, non-tender  See OB Vitals flowsheet.  ASSESSMENT/PLAN:  Fan was seen today for prenatal care.    Diagnoses and all orders for this visit:    High-risk pregnancy, unspecified trimester    Type 2 diabetes mellitus without complication, without long-term current use of insulin (H): Pre-pregnancy A1C was prediabetes range. On insulin NPH 20 units that was recently increased by CDE 2 days ago. Still with elevated fasting and post-prandial blood sugars. Growth US with MFM yesterday showed EFW 8 lb 0 oz (3635 g) with AC >99%ile. Discussed risks of fetal macrosomia today, including increased risk of operative delivery, need for baby to go to NICU for hypoglycemia management. Plan for induction- unfavorable bishops- plan for cervical ripening with oral misoprostol  -     insulin NPH (NOVOLIN N VIAL) 100 UNIT/ML vial; Inject 20 Units subcutaneously at bedtime.    Class 1 obesity without serious comorbidity in adult, unspecified BMI, unspecified obesity type    Pregnancy, unspecified gestational age        -IUP at 37w0d:   Prenatal labs reviewed   Due to inadequate glycemic control and suspected fetal macrosomia, plan for induction 37w0d. Called [facility] and due to staffing, they " are unable to take patient today or tomorrow for induction. Called [facility] and spoke to labor charge RN who schedule patient for induction this evening- will need cervical ripening with oral misoprostol.      Sully Barney MD

## 2024-12-18 NOTE — LETTER
[facility]  [address] [address]  Phone: [phone]  Fax: [phone]    2024        To whom it may concern:      RE: Eh Taw      Eh Taw welcomed his  baby 24. Please excuse him from work due to this delivery and hospitalization from 24 to 24.      Please contact me for questions or concerns.      Sincerely,        Aleisha Donald MD

## 2024-12-19 VITALS
DIASTOLIC BLOOD PRESSURE: 83 MMHG | SYSTOLIC BLOOD PRESSURE: 116 MMHG | TEMPERATURE: 99.5 F | RESPIRATION RATE: 18 BRPM | OXYGEN SATURATION: 98 %

## 2024-12-19 PROBLEM — Z34.90 ENCOUNTER FOR INDUCTION OF LABOR: Status: ACTIVE | Noted: 2024-12-19

## 2024-12-19 LAB
GLUCOSE BLDC GLUCOMTR-MCNC: 119 MG/DL (ref 70–99)
GLUCOSE BLDC GLUCOMTR-MCNC: 119 MG/DL (ref 70–99)
GLUCOSE BLDC GLUCOMTR-MCNC: 126 MG/DL (ref 70–99)
GLUCOSE BLDC GLUCOMTR-MCNC: 154 MG/DL (ref 70–99)
GLUCOSE BLDC GLUCOMTR-MCNC: 185 MG/DL (ref 70–99)
GLUCOSE BLDC GLUCOMTR-MCNC: 99 MG/DL (ref 70–99)
GLUCOSE BLDC GLUCOMTR-MCNC: 99 MG/DL (ref 70–99)
T PALLIDUM AB SER QL: NONREACTIVE

## 2024-12-19 PROCEDURE — 3E033VJ INTRODUCTION OF OTHER HORMONE INTO PERIPHERAL VEIN, PERCUTANEOUS APPROACH: ICD-10-PCS | Performed by: FAMILY MEDICINE

## 2024-12-19 PROCEDURE — 3E0R3BZ INTRODUCTION OF ANESTHETIC AGENT INTO SPINAL CANAL, PERCUTANEOUS APPROACH: ICD-10-PCS | Performed by: ANESTHESIOLOGY

## 2024-12-19 PROCEDURE — 96372 THER/PROPH/DIAG INJ SC/IM: CPT | Performed by: FAMILY MEDICINE

## 2024-12-19 PROCEDURE — 258N000003 HC RX IP 258 OP 636: Performed by: FAMILY MEDICINE

## 2024-12-19 PROCEDURE — 250N000011 HC RX IP 250 OP 636: Performed by: ANESTHESIOLOGY

## 2024-12-19 PROCEDURE — 258N000001 HC RX 258: Performed by: DIETITIAN, REGISTERED

## 2024-12-19 PROCEDURE — 250N000013 HC RX MED GY IP 250 OP 250 PS 637: Performed by: FAMILY MEDICINE

## 2024-12-19 PROCEDURE — 250N000009 HC RX 250: Performed by: DIETITIAN, REGISTERED

## 2024-12-19 PROCEDURE — 250N000013 HC RX MED GY IP 250 OP 250 PS 637: Performed by: DIETITIAN, REGISTERED

## 2024-12-19 PROCEDURE — 00HU33Z INSERTION OF INFUSION DEVICE INTO SPINAL CANAL, PERCUTANEOUS APPROACH: ICD-10-PCS | Performed by: ANESTHESIOLOGY

## 2024-12-19 PROCEDURE — 82962 GLUCOSE BLOOD TEST: CPT

## 2024-12-19 PROCEDURE — 120N000001 HC R&B MED SURG/OB

## 2024-12-19 PROCEDURE — 250N000011 HC RX IP 250 OP 636: Performed by: FAMILY MEDICINE

## 2024-12-19 RX ORDER — DEXTROSE MONOHYDRATE 25 G/50ML
25-50 INJECTION, SOLUTION INTRAVENOUS
Status: DISCONTINUED | OUTPATIENT
Start: 2024-12-19 | End: 2024-12-20 | Stop reason: HOSPADM

## 2024-12-19 RX ORDER — OXYTOCIN/0.9 % SODIUM CHLORIDE 30/500 ML
1-24 PLASTIC BAG, INJECTION (ML) INTRAVENOUS CONTINUOUS
Status: DISCONTINUED | OUTPATIENT
Start: 2024-12-19 | End: 2024-12-20 | Stop reason: HOSPADM

## 2024-12-19 RX ORDER — SODIUM CHLORIDE, SODIUM LACTATE, POTASSIUM CHLORIDE, CALCIUM CHLORIDE 600; 310; 30; 20 MG/100ML; MG/100ML; MG/100ML; MG/100ML
INJECTION, SOLUTION INTRAVENOUS CONTINUOUS PRN
Status: DISCONTINUED | OUTPATIENT
Start: 2024-12-19 | End: 2024-12-20 | Stop reason: HOSPADM

## 2024-12-19 RX ORDER — HYDROXYZINE HYDROCHLORIDE 25 MG/1
50 TABLET, FILM COATED ORAL EVERY 6 HOURS PRN
Status: DISCONTINUED | OUTPATIENT
Start: 2024-12-19 | End: 2024-12-20 | Stop reason: HOSPADM

## 2024-12-19 RX ORDER — NALBUPHINE HYDROCHLORIDE 10 MG/ML
2.5-5 INJECTION INTRAMUSCULAR; INTRAVENOUS; SUBCUTANEOUS EVERY 6 HOURS PRN
Status: DISCONTINUED | OUTPATIENT
Start: 2024-12-19 | End: 2024-12-21 | Stop reason: HOSPADM

## 2024-12-19 RX ORDER — FENTANYL/ROPIVACAINE/NS/PF 2MCG/ML-.1
PLASTIC BAG, INJECTION (ML) EPIDURAL
Status: DISCONTINUED | OUTPATIENT
Start: 2024-12-19 | End: 2024-12-20 | Stop reason: HOSPADM

## 2024-12-19 RX ORDER — FAMOTIDINE 20 MG/1
20 TABLET, FILM COATED ORAL 2 TIMES DAILY PRN
Status: DISCONTINUED | OUTPATIENT
Start: 2024-12-19 | End: 2024-12-21 | Stop reason: HOSPADM

## 2024-12-19 RX ORDER — NICOTINE POLACRILEX 4 MG
15-30 LOZENGE BUCCAL
Status: DISCONTINUED | OUTPATIENT
Start: 2024-12-19 | End: 2024-12-20 | Stop reason: HOSPADM

## 2024-12-19 RX ORDER — LIDOCAINE 40 MG/G
CREAM TOPICAL
Status: DISCONTINUED | OUTPATIENT
Start: 2024-12-19 | End: 2024-12-20 | Stop reason: HOSPADM

## 2024-12-19 RX ORDER — DEXTROSE MONOHYDRATE 100 MG/ML
INJECTION, SOLUTION INTRAVENOUS CONTINUOUS PRN
Status: DISCONTINUED | OUTPATIENT
Start: 2024-12-19 | End: 2024-12-20 | Stop reason: HOSPADM

## 2024-12-19 RX ORDER — MORPHINE SULFATE 10 MG/ML
10 INJECTION, SOLUTION INTRAMUSCULAR; INTRAVENOUS
Status: DISCONTINUED | OUTPATIENT
Start: 2024-12-19 | End: 2024-12-20 | Stop reason: HOSPADM

## 2024-12-19 RX ORDER — TERBUTALINE SULFATE 1 MG/ML
0.25 INJECTION, SOLUTION SUBCUTANEOUS
Status: DISCONTINUED | OUTPATIENT
Start: 2024-12-19 | End: 2024-12-20 | Stop reason: HOSPADM

## 2024-12-19 RX ORDER — FENTANYL CITRATE-0.9 % NACL/PF 10 MCG/ML
100 PLASTIC BAG, INJECTION (ML) INTRAVENOUS EVERY 5 MIN PRN
Status: DISCONTINUED | OUTPATIENT
Start: 2024-12-19 | End: 2024-12-20 | Stop reason: HOSPADM

## 2024-12-19 RX ADMIN — HYDROXYZINE HYDROCHLORIDE 50 MG: 25 TABLET ORAL at 05:21

## 2024-12-19 RX ADMIN — MISOPROSTOL 25 MCG: 100 TABLET ORAL at 03:11

## 2024-12-19 RX ADMIN — INSULIN HUMAN 20 UNITS: 100 INJECTION, SUSPENSION SUBCUTANEOUS at 20:18

## 2024-12-19 RX ADMIN — Medication: at 21:09

## 2024-12-19 RX ADMIN — MISOPROSTOL 25 MCG: 100 TABLET ORAL at 12:01

## 2024-12-19 RX ADMIN — MISOPROSTOL 25 MCG: 100 TABLET ORAL at 01:12

## 2024-12-19 RX ADMIN — SODIUM CHLORIDE, POTASSIUM CHLORIDE, SODIUM LACTATE AND CALCIUM CHLORIDE 1000 ML: 600; 310; 30; 20 INJECTION, SOLUTION INTRAVENOUS at 20:57

## 2024-12-19 RX ADMIN — INSULIN HUMAN 1 UNITS/HR: 1 INJECTION, SOLUTION INTRAVENOUS at 21:42

## 2024-12-19 RX ADMIN — MISOPROSTOL 25 MCG: 100 TABLET ORAL at 10:01

## 2024-12-19 RX ADMIN — DEXTROSE MONOHYDRATE: 100 INJECTION, SOLUTION INTRAVENOUS at 21:42

## 2024-12-19 RX ADMIN — MISOPROSTOL 25 MCG: 100 TABLET ORAL at 07:33

## 2024-12-19 RX ADMIN — HYDROXYZINE HYDROCHLORIDE 50 MG: 25 TABLET ORAL at 00:01

## 2024-12-19 RX ADMIN — HYDROXYZINE HYDROCHLORIDE 50 MG: 25 TABLET ORAL at 14:44

## 2024-12-19 RX ADMIN — Medication 2 MILLI-UNITS/MIN: at 16:46

## 2024-12-19 RX ADMIN — MISOPROSTOL 25 MCG: 100 TABLET ORAL at 05:21

## 2024-12-19 RX ADMIN — MISOPROSTOL 25 MCG: 100 TABLET ORAL at 14:30

## 2024-12-19 RX ADMIN — MORPHINE SULFATE 10 MG: 10 INJECTION, SOLUTION INTRAMUSCULAR; INTRAVENOUS at 00:01

## 2024-12-19 ASSESSMENT — ACTIVITIES OF DAILY LIVING (ADL)
ADLS_ACUITY_SCORE: 18

## 2024-12-19 NOTE — PROVIDER NOTIFICATION
Patient describing increased pain with contractions. SVE 1.5-50-60%/-3. No change from previous SVE. Provider at bedside and discussed options with patient. Plan to continue with cervical ripening and give a dose of Hydroxyzine.

## 2024-12-19 NOTE — PROGRESS NOTES
Labor Progress Note    Assessment/Plan  29 year old  at 37w1d gestational age admitted for IOL w cervical ripening w misoprostol due to pre-existing T2DM on insulin w poorly controlled blood sugars. 6 doses cytotec so far. Concern for fetal macrosomia -- last growth US  w AC >99%ile. GBS negative. FHR cat 1.      - Continue to monitor FHR  - Anticipate ; prepare for potential shoulder dystocia    Subjective  Lying in bed resting. Feeling some contractions but was able to sleep overnight after morphine.      Objective  Vital signs in last 24 hours  Temp:  [97.9  F (36.6  C)-99  F (37.2  C)] 99  F (37.2  C)  Pulse:  [107] 107  Resp:  [16-18] 18  BP: (117-129)/(65-82) 118/65  SpO2:  [98 %] 98 %      Physical Exam  General: Lying in bed, appears comfortable  Abd: Gravid  Cervix: 1.5cm, 50-70% effaced, -3 station @ 0504  FHR: Baseline 138/mod variability/+ accels/- decels; Category 1 tracing  [place]: Contractions Q 2-6 min  Extremities: trace peripheral edema    Pertinent Labs   Lab Results   Component Value Date    ABORH B POS 2024    HGB 11.9 2024        Patient discussed with attending physician, Dr. Sully Barney  , who agrees with the plan.     Radha Aldridge MD PGY-1 2024  [facility] Res[facility]

## 2024-12-19 NOTE — PLAN OF CARE
Problem: Labor  Goal: Effective Progression to Delivery  Outcome: Progressing   Problem: Labor  Goal: Stable Fetal Wellbeing  Outcome: Progressing   Problem: Labor  Goal: Normal Uterine Contraction Pattern  Outcome: Progressing   Problem: Adult Inpatient Plan of Care  Goal: Optimal Comfort and Wellbeing  Outcome: Progressing     Pt rested overnight. Vital signs stable, up ad dieter. Pt j luis every 2-4 minutes and are  seconds long, contractions palpate mild. FHR moderate variability with accelerations, baseline 140 bpm. Pt received cytotec to help labor progression. Pt received heat packs, pillows, Hydroxyzine, and morphine to help cope with labor.     Elaine Dominguez RN

## 2024-12-19 NOTE — PROGRESS NOTES
Labor Progress Note    Assessment/Plan  29 year old  at 37w1d gestational age admitted for IOL w cervical ripening due to pre-existing T2DM on insulin w poorly controlled blood sugars. S/p 9 doses cytotec. Cervix unchanged, [place] 4. However, given that patient is multiparous and j luis on the monitor, will start pitocin now to attempt transition to active labor more quickly. Concern for fetal macrosomia -- last growth US  w AC >99%ile. GBS negative. FHR cat 1.      - Start pitocin 2x2  - Continue to monitor FHR  - Anticipate   - Hydroxyzine 50mg Q6H PRN for therapeutic rest  - Morphine 10mg PRN at bedtime for therapeutic rest    Subjective  Lying in bed resting. Feeling tired and concerned that her back/hip pain and fatigue will make it too difficult for her to push the baby out. Wondering if she should just proceed with a  instead. I discussed that c-sections come with risks that are good to avoid if possible. I reviewed that there are no concerns with her FHR strip or labor course so far that indicate a  is necessary right now. I also reviewed that we can assist her with rest and pain management prior to epidural. Renee expressed understanding of all this and would like to continue to proceed toward vaginal delivery.      Objective  Vital signs in last 24 hours  Temp:  [97.9  F (36.6  C)-99  F (37.2  C)] 98.7  F (37.1  C)  Resp:  [16-18] 18  BP: (111-129)/(65-78) 114/70      Physical Exam  General: Lying in bed, appears tired but comfortable  Abd: Gravid  Cervix: 1.5cm, 50-60% effaced, -3 station  FHR: Baseline 145/mod variability/+ accels/- decels; Category 1 tracing  [place]: Contractions Q 2-5 min    Pertinent Labs   Lab Results   Component Value Date    ABORH B POS 2024    HGB 11.9 2024        Patient discussed with attending physician, Dr. Sully Barney  , who agrees with the plan.     Radha Aldridge MD PGY-1 2024  [facility]  Family Medicine Residency Program

## 2024-12-19 NOTE — MEDICATION SCRIBE - ADMISSION MEDICATION HISTORY
"Medication Scribe Admission Medication History    Admission medication history is complete. The information provided in this note is only as accurate as the sources available at the time of the update.    Information Source(s): Patient and CareEverywhere/SureScripts via in-person    Pertinent Information: Patient is currently taking insulin NPH 20 unit(s) at bedtime. Last dose yesterday HS. Has PRN calcium carbonate and famotidine 20 mg, both taking this morning PTA.     No other Rx or OTC medications reported. No known allergies.     Changes made to PTA medication list:  Added:   Famotidine 20 mg  Deleted: None  Changed: None    Allergies reviewed with patient and updates made in EHR: yes    Medication History Completed By: Mathew Saha 12/18/2024 8:11 PM    PTA Med List   Medication Sig Last Dose/Taking    albuterol (PROAIR HFA/PROVENTIL HFA/VENTOLIN HFA) 108 (90 Base) MCG/ACT inhaler Inhale 2 puffs into the lungs every 6 hours as needed for shortness of breath, wheezing or cough. Past Week    calcium carbonate (TUMS) 500 MG chewable tablet Take 1 tablet (500 mg) by mouth 3 times daily as needed for heartburn. 12/18/2024 Morning    famotidine (PEPCID) 20 MG tablet Take 20 mg by mouth 2 times daily as needed (heartburn). 12/18/2024 Morning    insulin NPH (NOVOLIN N VIAL) 100 UNIT/ML vial Inject 20 Units subcutaneously at bedtime. 12/17/2024 Bedtime    insulin pen needle (BD SHARMAINE U/F) 32G X 4 MM miscellaneous Use one pen needle daily or as directed. 12/17/2024    insulin syringe-needle U-100 (31G X 5/16\" 0.3 ML) 31G X 5/16\" 0.3 ML miscellaneous Use one syringe daily or as directed. 12/17/2024    thin (NO BRAND SPECIFIED) lancets Use with lanceting device. To accompany: Blood Glucose Monitor Brands: per insurance. 12/17/2024       "

## 2024-12-19 NOTE — PROVIDER NOTIFICATION
Notified MD tat pt has arrived to unit. Per MD to start admission, start continuous monitoring, and start cytotec induction. Pt takes NPH insulin at night and MD wants to ensure the pt gets her bedtime dose of NPH for today.    Provider to put in admission orders, cytotec induction orders, diabetes order set, and pt medications.    Elaine Dominguez RN

## 2024-12-19 NOTE — H&P
OBSTETRICS ADMISSION HISTORY & PHYSICAL  DATE OF ADMISSION: 2024  7:28 PM        Assessment and Plan:   Assessment:  Fan Duran is a 29 year old  at 37w0d in for IOL.   Patient Active Problem List   Diagnosis    Class 1 obesity without serious comorbidity in adult, unspecified BMI, unspecified obesity type    Insulin controlled gestational diabetes mellitus (GDM) in third trimester    Type 2 diabetes mellitus without complication, without long-term current use of insulin (H)    High-risk pregnancy, unspecified trimester    Pregnancy        PLAN:   Admit - see IP orders  Labor induction with cytotec for 12 doses  GBS: negative. Antibiotics are not indicated   Comfort plan: patient made aware of pain options.   Will monitor labor progress along with RN and update attending physician    Patient will be seen by Dr. Sully Barney.     Adryan Farmer DO PGY-1,  2024  [facility]         Chief Complaint:     IOL        History of Present Illness:     Fan Duran is a 29 year old year old  at 37w0d confirmed by ultrasound. Patient received prenatal care with Sully Barney MD, at [facility].   Presents to the [facility] for induction of labor, indication inadequate glycemic control and suspected fetal macrosomia.  The patient currently take insulin. Glucose on admission was 121. A1c today is 6.6.   She denies of contractions. She denies fluid leakage. She denies bleeding per vagina. Fetal movement is normal.    Her prenatal course has been complicated by insulin-dependent diabetes.     Prenatal labs   Lab Results   Component Value Date    AS Negative 2024    HEPBANG Nonreactive 2024    CHPCRT Negative 2024    GCPCRT Negative 2024    HGB 11.8 2024       GBS was collected on 24.  Weight gain during pregnancy: 10.4 kg (23 lb)         Obstetrical History:     OB History    Para  Term  AB Living   5 3 3 0 1 3   SAB IAB Ectopic Multiple Live Births   1 0 0 0 3      # Outcome Date GA Lbr Mauricio/2nd Weight Sex Type Anes PTL Lv   5 Current            4 Term 08/10/22 40w4d 02::28 3.35 kg (7 lb 6.2 oz) F Vag-Spont None N FATMATA      Name: Gifty Martin      Apgar1: 8  Apgar5: 9   3 SAB 19           2 Term 17   3.175 kg (7 lb) M Vag-Spont   FATMATA      Birth Comments: Born in [facility]      Name: Chetan Jarrett   1 Term 12   2.948 kg (6 lb 8 oz) F Vag-Vacuum   FATMATA      Birth Comments: [facility]. Vacuum assisted delivery for failure to progress in 2nd stage      Name: Rozina Eh Say Paw              Immunzations:     Most Recent Immunizations   Administered Date(s) Administered    COVID-19 Monovalent 18+ (Moderna) 2021    COVID-19 Monovalent Booster 18+ (Moderna) 2022    Flu, Unspecified 2011    HPV Quadrivalent 2011    HPV9 2020    HepA, Unspecified 2011    HepB, Unspecified 2010    Hepatitis A Immunity: Titer/MD Dx 2019    Hepatitis B Immunity: Titer 2019    Influenza Vaccine >6 months,quad, PF 10/05/2022    Influenza Vaccine, 6+MO IM (QUADRIVALENT W/PRESERVATIVES) 10/08/2023    Influenza, Split Virus, Trivalent, Pf (Fluzone\Fluarix) 2024    MMR 2010    Meningococcal Mcv4 Conjugate,unspecified  2010    Poliovirus, inactivated (IPV) 2010    RSV Vaccine (Abrysvo) 2024    TD,PF 7+ (Tenivac) 10/29/2019    TDAP (Adacel,Boostrix) 10/30/2024    Td,adult,historic,unspecified 2011    Varicella 2010    Varicella Immunity: Titer/MD Dx 10/02/2019   Deferred Date(s) Deferred    COVID-19 Bivalent 12+ (Pfizer) 10/05/2022     Tdap this pregnancy?  YES - Date: 10/30/24  Flu shot this pregnancy?YES - Date: 24  COVID vaccine? NO  RSV vaccine? YES - Date: 24         Past Medical History:     Past Medical History:   Diagnosis Date    Asthma     H/O: iron  deficiency anemia     as a young child    Miscarriage 10/1/2019    Overweight             Past Surgical History:     Past Surgical History:   Procedure Laterality Date    NO PAST SURGERIES              Family History:     Family History   Problem Relation Age of Onset    Depression Mother     Cancer Father         esophageal cancer - getting treatment; he is a smoker    Hepatitis Father         C    Mental Illness Daughter         self-harm, depression, reaction/poor attachment    No Known Problems Son     No Known Problems Other             Social History:   no tobacco use  no alcohol use  no illicit drug use         Medications:     Current Facility-Administered Medications   Medication Dose Route Frequency Provider Last Rate Last Admin    carboprost (HEMABATE) injection 250 mcg  250 mcg Intramuscular Q15 Min PRN Sully Barney MD        And    loperamide (IMODIUM) capsule 4 mg  4 mg Oral Once PRN Sully Barney MD        glucose gel 15-30 g  15-30 g Oral Q15 Min PRN Sully Barney MD        Or    dextrose 50 % injection 25-50 mL  25-50 mL Intravenous Q15 Min PRN Sully Barney MD        Or    glucagon injection 1 mg  1 mg Subcutaneous Q15 Min PRN Sully Barney MD        fentaNYL (PF) (SUBLIMAZE) injection 100 mcg  100 mcg Intravenous Q1H PRN Sully Barney MD        hydrOXYzine HCl (ATARAX) tablet 50 mg  50 mg Oral At Bedtime PRN Sully Barney MD        ketorolac (TORADOL) injection 30 mg  30 mg Intravenous Once PRN Sully Barney MD        Or    ketorolac (TORADOL) injection 30 mg  30 mg Intramuscular Once PRN Sully Braney MD        Or    ibuprofen (ADVIL/MOTRIN) tablet 800 mg  800 mg Oral Once PRN Sully Barney MD        insulin NPH injection 20 Units  20 Units Subcutaneous At Bedtime Sully Barney MD        lactated ringers BOLUS 1,000 mL  1,000 mL Intravenous Once PRN Sully Barney MD        Or    lactated ringers BOLUS 500 mL  500 mL Intravenous Once PRN Sully Barney MD        lidocaine  1 % 0.1-20 mL  0.1-20 mL Subcutaneous Once PRN Sully Barney MD        loperamide (IMODIUM) capsule 2 mg  2 mg Oral Q2H PRN Sully Barney MD        methylergonovine (METHERGINE) injection 200 mcg  200 mcg Intramuscular Q2H PRN Sully Barney MD        metoclopramide (REGLAN) tablet 10 mg  10 mg Oral Q6H PRN Sully Barney MD        Or    metoclopramide (REGLAN) injection 10 mg  10 mg Intravenous Q6H PRN Sully Barney MD        misoprostol (cervical ripening) (CYTOTEC) quarter-tab 25 mcg  25 mcg Oral Q2H PRN Sully Barney MD   25 mcg at 12/18/24 2025    misoprostol (CYTOTEC) tablet 400 mcg  400 mcg Oral ONCE PRN REPEAT PER INSTRUCTIONS Sully Barney MD        Or    misoprostol (CYTOTEC) tablet 800 mcg  800 mcg Rectal ONCE PRN REPEAT PER INSTRUCTIONS Sully Barney MD        morphine (PF) injection 10 mg  10 mg Intramuscular Once PRN Sully Barney MD        naloxone (NARCAN) injection 0.2 mg  0.2 mg Intravenous Q2 Min PRN Sully Barney MD        Or    naloxone (NARCAN) injection 0.4 mg  0.4 mg Intravenous Q2 Min PRN Sully Barney MD        Or    naloxone (NARCAN) injection 0.2 mg  0.2 mg Intramuscular Q2 Min PRN Sully Barney MD        Or    naloxone (NARCAN) injection 0.4 mg  0.4 mg Intramuscular Q2 Min PRN Sully Barney MD        nitrous oxide/oxygen 50/50 blend   Inhalation Continuous PRN Sully Barney MD        ondansetron (ZOFRAN ODT) ODT tab 4 mg  4 mg Oral Q6H PRN Sully Barney MD        Or    ondansetron (ZOFRAN) injection 4 mg  4 mg Intravenous Q6H PRN Sully Barney MD        oxytocin (PITOCIN) 30 units in 500 mL 0.9% NaCl infusion  100-340 mL/hr Intravenous Continuous PRN Sully Barney MD        oxytocin (PITOCIN) 30 units in 500 mL 0.9% NaCl infusion  340 mL/hr Intravenous Continuous PRN Sully Barney MD        oxytocin (PITOCIN) injection 10 Units  10 Units Intramuscular Once PRN Sully Barney MD        oxytocin (PITOCIN) injection 10 Units  10 Units Intramuscular Once  "PRN Sully Barney MD        prochlorperazine (COMPAZINE) tablet 10 mg  10 mg Oral Q6H PRN Sully Barney MD        Or    prochlorperazine (COMPAZINE) injection 10 mg  10 mg Intravenous Q6H PRN Sully Barney MD        sodium citrate-citric acid (BICITRA) solution 30 mL  30 mL Oral Once PRN Sully Barney MD        tranexamic acid 1 g in 100 mL NS IV bag (premix)  1 g Intravenous Q30 Min PRN Sully Barney MD                Allergies:   Patient has no known allergies.         Review of Systems:   CONSTITUTIONAL: no unexpected change in weight  EYES: no acute vision problems or changes  ENT: no ear problems, no mouth problems, no throat problems  RESP: no significant cough, no shortness of breath  CV: no chest pain, no palpitations, no new or worsening peripheral edema  GI: no nausea, no vomiting, no constipation, no diarrhea  : no frequency, no dysuria, no hematuria  NEURO: no headaches         Physical Exam:   Vitals:   LMP  (LMP Unknown)   0 lbs 0 oz  Estimated body mass index is 33.99 kg/m  as calculated from the following:    Height as of an earlier encounter on 12/18/24: 1.626 m (5' 4\").    Weight as of an earlier encounter on 12/18/24: 89.8 kg (198 lb).    GEN: Awake, alert in no apparent distress   HEENT: grossly normal  NECK: no lymphadenopathy or thryoidomegaly  RESPIRATORY: clear to auscultation bilaterally, no increased work of breathing  BACK:  no costovertebral angle tenderness   CARDIOVASCULAR: RRR, no murmur  ABDOMEN: gravid.   EXT:  no edema or calf tenderness  Confirmed VTX by Ultrasound? Yes; last ultrasound on 12/17/24    Electronic Fetal Monitoring:  Baseline rate normal  Variability moderate  Accelerations present  Decelerations not present    Assessment: Category I EFM interpretation suggests absence of concern for fetal metabolic acidemia at this time due to accelerations present, decelerations absent, heart rate: normal baseline, and variability: moderate    Uterine Activity " irregular.    Strip reviewed on unit    NST interpretation:  Baseline rate 137 normal  Accelerations present  Decelerations not present  Interpretation: reactive

## 2024-12-20 LAB
GLUCOSE BLDC GLUCOMTR-MCNC: 101 MG/DL (ref 70–99)
GLUCOSE BLDC GLUCOMTR-MCNC: 117 MG/DL (ref 70–99)
GLUCOSE BLDC GLUCOMTR-MCNC: 124 MG/DL (ref 70–99)
GLUCOSE BLDC GLUCOMTR-MCNC: 135 MG/DL (ref 70–99)
GLUCOSE BLDC GLUCOMTR-MCNC: 137 MG/DL (ref 70–99)
GLUCOSE BLDC GLUCOMTR-MCNC: 153 MG/DL (ref 70–99)
GLUCOSE BLDC GLUCOMTR-MCNC: 84 MG/DL (ref 70–99)

## 2024-12-20 PROCEDURE — 250N000013 HC RX MED GY IP 250 OP 250 PS 637

## 2024-12-20 PROCEDURE — 250N000009 HC RX 250: Performed by: FAMILY MEDICINE

## 2024-12-20 PROCEDURE — 120N000001 HC R&B MED SURG/OB

## 2024-12-20 PROCEDURE — 59400 OBSTETRICAL CARE: CPT | Mod: GC | Performed by: FAMILY MEDICINE

## 2024-12-20 PROCEDURE — 722N000001 HC LABOR CARE VAGINAL DELIVERY SINGLE

## 2024-12-20 PROCEDURE — 250N000011 HC RX IP 250 OP 636: Performed by: FAMILY MEDICINE

## 2024-12-20 RX ORDER — NICOTINE POLACRILEX 4 MG
15-30 LOZENGE BUCCAL
Status: DISCONTINUED | OUTPATIENT
Start: 2024-12-20 | End: 2024-12-21 | Stop reason: HOSPADM

## 2024-12-20 RX ORDER — HYDROCORTISONE 25 MG/G
CREAM TOPICAL 3 TIMES DAILY PRN
Status: DISCONTINUED | OUTPATIENT
Start: 2024-12-20 | End: 2024-12-21 | Stop reason: HOSPADM

## 2024-12-20 RX ORDER — CARBOPROST TROMETHAMINE 250 UG/ML
250 INJECTION, SOLUTION INTRAMUSCULAR
Status: DISCONTINUED | OUTPATIENT
Start: 2024-12-20 | End: 2024-12-21 | Stop reason: HOSPADM

## 2024-12-20 RX ORDER — DOCUSATE SODIUM 100 MG/1
100 CAPSULE, LIQUID FILLED ORAL DAILY
Status: DISCONTINUED | OUTPATIENT
Start: 2024-12-20 | End: 2024-12-21 | Stop reason: HOSPADM

## 2024-12-20 RX ORDER — LOPERAMIDE HYDROCHLORIDE 2 MG/1
2 CAPSULE ORAL
Status: DISCONTINUED | OUTPATIENT
Start: 2024-12-20 | End: 2024-12-21 | Stop reason: HOSPADM

## 2024-12-20 RX ORDER — IBUPROFEN 800 MG/1
800 TABLET, FILM COATED ORAL EVERY 6 HOURS PRN
Status: DISCONTINUED | OUTPATIENT
Start: 2024-12-20 | End: 2024-12-21 | Stop reason: HOSPADM

## 2024-12-20 RX ORDER — DEXTROSE MONOHYDRATE 25 G/50ML
25-50 INJECTION, SOLUTION INTRAVENOUS
Status: DISCONTINUED | OUTPATIENT
Start: 2024-12-20 | End: 2024-12-21 | Stop reason: HOSPADM

## 2024-12-20 RX ORDER — ACETAMINOPHEN 325 MG/1
650 TABLET ORAL EVERY 4 HOURS PRN
Status: DISCONTINUED | OUTPATIENT
Start: 2024-12-20 | End: 2024-12-20

## 2024-12-20 RX ORDER — MODIFIED LANOLIN
OINTMENT (GRAM) TOPICAL
Status: DISCONTINUED | OUTPATIENT
Start: 2024-12-20 | End: 2024-12-21 | Stop reason: HOSPADM

## 2024-12-20 RX ORDER — MISOPROSTOL 200 UG/1
400 TABLET ORAL
Status: DISCONTINUED | OUTPATIENT
Start: 2024-12-20 | End: 2024-12-21 | Stop reason: HOSPADM

## 2024-12-20 RX ORDER — OXYTOCIN/0.9 % SODIUM CHLORIDE 30/500 ML
340 PLASTIC BAG, INJECTION (ML) INTRAVENOUS CONTINUOUS PRN
Status: DISCONTINUED | OUTPATIENT
Start: 2024-12-20 | End: 2024-12-21 | Stop reason: HOSPADM

## 2024-12-20 RX ORDER — MISOPROSTOL 200 UG/1
800 TABLET ORAL
Status: DISCONTINUED | OUTPATIENT
Start: 2024-12-20 | End: 2024-12-21 | Stop reason: HOSPADM

## 2024-12-20 RX ORDER — BISACODYL 10 MG
10 SUPPOSITORY, RECTAL RECTAL DAILY PRN
Status: DISCONTINUED | OUTPATIENT
Start: 2024-12-20 | End: 2024-12-21 | Stop reason: HOSPADM

## 2024-12-20 RX ORDER — ACETAMINOPHEN 325 MG/1
650 TABLET ORAL EVERY 6 HOURS
Status: DISCONTINUED | OUTPATIENT
Start: 2024-12-20 | End: 2024-12-20

## 2024-12-20 RX ORDER — OXYTOCIN 10 [USP'U]/ML
10 INJECTION, SOLUTION INTRAMUSCULAR; INTRAVENOUS
Status: DISCONTINUED | OUTPATIENT
Start: 2024-12-20 | End: 2024-12-21 | Stop reason: HOSPADM

## 2024-12-20 RX ORDER — LOPERAMIDE HYDROCHLORIDE 2 MG/1
4 CAPSULE ORAL
Status: DISCONTINUED | OUTPATIENT
Start: 2024-12-20 | End: 2024-12-21 | Stop reason: HOSPADM

## 2024-12-20 RX ORDER — ACETAMINOPHEN 325 MG/1
650 TABLET ORAL EVERY 4 HOURS PRN
Status: DISCONTINUED | OUTPATIENT
Start: 2024-12-20 | End: 2024-12-21 | Stop reason: HOSPADM

## 2024-12-20 RX ORDER — METHYLERGONOVINE MALEATE 0.2 MG/ML
200 INJECTION INTRAVENOUS
Status: DISCONTINUED | OUTPATIENT
Start: 2024-12-20 | End: 2024-12-21 | Stop reason: HOSPADM

## 2024-12-20 RX ORDER — TRANEXAMIC ACID 10 MG/ML
1 INJECTION, SOLUTION INTRAVENOUS EVERY 30 MIN PRN
Status: DISCONTINUED | OUTPATIENT
Start: 2024-12-20 | End: 2024-12-21 | Stop reason: HOSPADM

## 2024-12-20 RX ADMIN — KETOROLAC TROMETHAMINE 30 MG: 30 INJECTION, SOLUTION INTRAMUSCULAR at 05:21

## 2024-12-20 RX ADMIN — DOCUSATE SODIUM 100 MG: 100 CAPSULE, LIQUID FILLED ORAL at 08:06

## 2024-12-20 RX ADMIN — Medication 340 ML/HR: at 01:42

## 2024-12-20 RX ADMIN — ACETAMINOPHEN 650 MG: 325 TABLET ORAL at 08:12

## 2024-12-20 RX ADMIN — ACETAMINOPHEN 650 MG: 325 TABLET ORAL at 20:38

## 2024-12-20 RX ADMIN — ACETAMINOPHEN 650 MG: 325 TABLET ORAL at 14:57

## 2024-12-20 RX ADMIN — ACETAMINOPHEN 650 MG: 325 TABLET ORAL at 23:57

## 2024-12-20 RX ADMIN — IBUPROFEN 800 MG: 800 TABLET, FILM COATED ORAL at 23:57

## 2024-12-20 RX ADMIN — IBUPROFEN 800 MG: 800 TABLET, FILM COATED ORAL at 17:39

## 2024-12-20 RX ADMIN — IBUPROFEN 800 MG: 800 TABLET, FILM COATED ORAL at 11:28

## 2024-12-20 ASSESSMENT — ACTIVITIES OF DAILY LIVING (ADL)
ADLS_ACUITY_SCORE: 19
ADLS_ACUITY_SCORE: 18
ADLS_ACUITY_SCORE: 18
ADLS_ACUITY_SCORE: 19
ADLS_ACUITY_SCORE: 18
ADLS_ACUITY_SCORE: 19
ADLS_ACUITY_SCORE: 18
ADLS_ACUITY_SCORE: 19
ADLS_ACUITY_SCORE: 19
ADLS_ACUITY_SCORE: 18
ADLS_ACUITY_SCORE: 18
ADLS_ACUITY_SCORE: 19
ADLS_ACUITY_SCORE: 19
ADLS_ACUITY_SCORE: 18
ADLS_ACUITY_SCORE: 18
ADLS_ACUITY_SCORE: 19
ADLS_ACUITY_SCORE: 18

## 2024-12-20 NOTE — PROGRESS NOTES
POST-PARTUM PROGRESS NOTE    Assessment and Plan: Patient is a 29 year old year old  admitted on 2024. She delivered a healthy infant via Vaginal, Spontaneous on 2024 at 1:40 AM. Patient is doing well overall and has no concerns.  Prenatal care complicated by pre-existing type 2 diabetes on insulin.     Postpartum day 0  Normal postpartum course.  Continue to encourage and assist her with breastfeeding and routine  cares  Continue ibuprofen for pain control with scheduled Tylenol  Encourage ambulation  Anticipate discharge , primary clinic [facility]  Plans Depo injection for contraception    Patient discussed with attending physician, Dr. Jonny Muro, who agrees with the plan.    Aleisha Donald MD PGY-3  2024  [facility]    Subjective:  The patient feels ok. She is bonding with the infant.  Endorses mild to moderate abdominal pain with some relief with Tylenol and ibuprofen.  Low urine output reported by nursing since removal of angela.  Encouraging patient to drink by mouth.  Lochia normal, tolerating normal diet, and passing flatus. The patient has no emotional concerns to report at this time. The baby is well and being fed by both breast and bottle.    Objective:  /70 (BP Location: Right arm, Patient Position: Supine, Cuff Size: Adult Large)   Pulse 72   Temp 98.3  F (36.8  C) (Oral)   Resp 18   LMP  (LMP Unknown)   SpO2 98%   Breastfeeding Unknown   GEN: NAD  CV: RRR, no murmurs  PULM: CTAB  ABD: Fundus firm and+1 umbilicus, mild tenderness to palpation  EXT: Warm, dry and well perfused. No edema. Calves NTTP and equal.     Prenatal Labs:   Lab Results   Component Value Date    AS Negative 2024    HEPBANG Nonreactive 2024    CHPCRT Negative 2024    GCPCRT Negative 2024    HGB 11.9 2024     Hemoglobin   Date Value Ref Range Status   2024 11.9 11.7 - 15.7 g/dL Final    12/11/2024 11.8 11.7 - 15.7 g/dL Final        Immunization History   Administered Date(s) Administered    COVID-19 Monovalent 18+ (Moderna) 07/23/2021, 08/20/2021    COVID-19 Monovalent Booster 18+ (Moderna) 02/16/2022    Flu, Unspecified 11/09/2010, 09/12/2011    HPV Quadrivalent 03/11/2011, 05/11/2011, 09/12/2011    HPV9 10/29/2019, 01/02/2020    HepA, Unspecified 08/04/2010, 03/11/2011    HepB, Unspecified 08/04/2010, 09/09/2010, 12/09/2010    Hepatitis A Immunity: Titer/MD Dx 09/27/2019    Hepatitis B Immunity: Titer 09/27/2019    Influenza Vaccine >6 months,quad, PF 09/29/2020, 10/06/2021, 10/05/2022    Influenza Vaccine, 6+MO IM (QUADRIVALENT W/PRESERVATIVES) 10/15/2019, 10/08/2023    Influenza, Split Virus, Trivalent, Pf (Fluzone\Fluarix) 09/30/2024    MMR 08/04/2010, 09/09/2010    Meningococcal Mcv4 Conjugate,unspecified  08/04/2010    Poliovirus, inactivated (IPV) 08/04/2010, 09/09/2010, 11/09/2010    RSV Vaccine (Abrysvo) 12/04/2024    TD,PF 7+ (Tenivac) 10/29/2019    TDAP (Adacel,Boostrix) 08/04/2010, 05/25/2022, 10/30/2024    Td,adult,historic,unspecified 09/09/2010, 03/11/2011    Varicella 08/04/2010, 09/09/2010    Varicella Immunity: Titer/MD Dx 09/27/2019, 10/02/2019

## 2024-12-20 NOTE — PLAN OF CARE
Day RN ([phone])    VSS and afebrile.  Pt experiencing cramping pain that is fairly significant, MD aware, scheduled PO tylenol and PRN PO Ibuprofen, heat to abdomen.  Intact but swollen perineum healing wdl.  Up independently - steady.  Voiding adequately - want to measure one more void.  Tolerating regular diet well.  Fundus is firm, midline and +1.  Lochia wdl - few clots, one that was a bit larger and visualized by the nurse.  Bottle formula feeding baby, mother has very little interest in being involved in feeding/caring for the baby stating she is too tired and in too much pain - see other note.  Spouse/other family members intermittently at bedside - supportive of patient.  BG's have been stable and have not require insulin intervention.  Plan is home on discharge with infant and family.  Will continue to monitor.       Care Plan Problem    Problem: Adult Inpatient Plan of Care  Goal: Optimal Comfort and Wellbeing  Outcome: Progressing  Intervention: Provide Person-Centered Care  Recent Flowsheet Documentation  Taken 12/20/2024 0810 by Rhiannon Wick, RN  Trust Relationship/Rapport:   care explained   choices provided   emotional support provided   empathic listening provided   questions answered   questions encouraged   reassurance provided   thoughts/feelings acknowledged     Problem: Postpartum (Vaginal Delivery)  Goal: Effective Urinary Elimination  Outcome: Progressing     Problem: Postpartum (Vaginal Delivery)  Goal: Optimal Pain Control and Function  Outcome: Progressing

## 2024-12-20 NOTE — L&D DELIVERY NOTE
[facility] Delivery Summary  [facility]  Date of Service: 2024    Name      Fan Duran         1995  MRN       [id number]  PCP        Gloria Berg     DELIVERY NARRATIVE    On 2024 Fan Duran delivered a viable male infant at 37w2d with apgars of 7 and 8 via Vaginal, Spontaneous Delivery.  Prenatal course was notable for Type II diabetes on insulin and concerns for fetal macrosomia.    Stage 1: Fan presented to [facility]aternity Care on 2024 for IOL secondary to Type II diabetes. Her group B Strep (GBS) carrier status was negative.. She received cytotec and pitocin for induction/augmentation.  Labor course was not complicated.     Stage 2: Duration:  90 minutes.  Delivery was via vaginal, spontaneous to a sterile field under epidural anesthesia. Infant delivered in vertex left occiput anterior position. Shoulders delivered without difficulty. The baby was placed on the patient's abdomen and demonstrated a spontaneous cry and vigorous tone.  No resuscitation was needed.  Cord complications: none. Delayed cord clamping was performed.  The cord was doubly clamped and cut 3 vessels were noted.     Stage 3: Duration: 7 minutes.  Placenta delivered intact at 2024  1:47 AM. Placental disposition was Hospital disposal. Fundal massage performed and fundus found to be firm. The following uterotonics were given: Pitocin (IV). Perineum, vagina, cervix were inspected, and the following lacerations were noted: None. QBL: 300 mL.    Excellent hemostasis was noted. Needle and sponge count correct. Infant and patient in delivery room in good and stable condition.     _________________    GA: 37w2d  GP:   Labor Complications: None  Additional Complications:    QBL: 300 mL  Delivery Type: Vaginal, Spontaneous  Duration of Ruptured Membranes: 5h 38m  GBS Status:   Group B Strep PCR   Date Value Ref Range Status   2024  Negative Negative Final     Comment:     Presumed negative for Streptococcus agalactiae (Group B Streptococcus) or the number of organisms may be below the limit of detection of the assay.       Weight:    Apgar scores: 7 , 8         Len Duran [[id number]]      Labor Event Times      Active labor onset date: 24 Onset time:  8:02 PM   Dilation complete date: 24 Complete time: 11:52 PM   Start pushing date/time: 2024 2353          Labor Events     labor?: No   steroids: None  Labor Type: Induction/Cervical ripening  Predominate monitoring during 1st stage: continuous electronic fetal monitoring     Antibiotics received during labor?: No       Rupture date/time: 24   Rupture type: Spontaneous Rupture of Membranes  Fluid color: Clear  Fluid odor: Normal     Induction: Oxytocin, Misoprostol  Induction date/time: 24    Cervical ripening date/time: 24    Indications for induction: Diabetes (Comment to specify)       Delivery/Placenta Date and Time      Delivery Date: 24 Delivery Time:  1:40 AM   Placenta Date/Time: 2024  1:47 AM  Oxytocin given at the time of delivery: after delivery of baby  Delivering clinician: Sully Barney MD   Other personnel present at delivery:  Provider Role   Aleisha Jules RN Registered Nurse   Penelope Madrigal RN Registered Nurse             Vaginal Counts              Needles Suture Needles Sponges (RETIRED) Instruments   Initial counts 0 0 0    Added to count       Relief counts       Final counts 0 0 0            Placed during labor Accounted for at the end of labor   FSE No NA   IUPC No NA   Cervidil No NA                      Final count correct?: Yes  Pre-Birth Team Brief: Complete  Post-Birth Team Debrief: Complete       Apgars    Living status: Living   1 Minute 5 Minute 10 Minute 15 Minute 20 Minute   Skin color: 0  0       Heart rate: 2  2       Reflex irritability: 2  2        Muscle tone: 1  2       Respiratory effort: 2  2       Total: 7  8       Apgars assigned by: ALEISHA JULES RN       Cord      Vessels: 3 Vessels    Cord Complications: None               Cord Blood Disposition: Discard    Gases Sent?: No    Delayed cord clamping?: Yes    Cord Clamping Delay (seconds):  seconds    Stem cell collection?: No            Resuscitation    Methods: None       Skin to Skin and Feeding Plan      Skin to skin initiation date/time: 1841    Skin to skin with: Mother  Skin to skin end date/time:            Labor Events and Shoulder Dystocia    Fetal Tracing Prior to Delivery: Category 2  Shoulder dystocia present?: Neg       Delivery (Maternal) (Provider to Complete) ([id number])    Episiotomy: None  Perineal lacerations: None    Repair suture: None  Genital tract inspection done: Pos       Blood Loss  Mother: Fan Duran #[id number]     Start of Mother's Information      Delivery Blood Loss   Intrapartum & Postpartum: 24 - 24    Delivery Admission: 24 - 24         Intrapartum & Postpartum Delivery Admission    Delivery QBL (mL) Hospital Encounter 300 mL 300 mL    Total  300 mL 300 mL               End of Mother's Information  Mother: Fan uDran #[id number]                Delivery - Provider to Complete ([id number])    Delivering clinician: Sully Barney MD  Delivery Type (Choose the 1 that will go to the Birth History): Vaginal, Spontaneous                         Other personnel:  Provider Role   Aleisha Jules, RN Registered Nurse   Penelope Madrigal RN Registered Nurse                    Placenta    Date/Time: 2024  1:47 AM  Removal: Spontaneous  Disposition: Hospital disposal             Anesthesia    Method: Epidural  Cervical dilation at placement: 4-7                    Presentation and Position    Presentation: Vertex    Position: Left Occiput Anterior                     Delivery was supervised by  attending physician Dr. Sully Barney.      Adryan Farmer DO, PGY1, 12/20/2024  [facility]

## 2024-12-20 NOTE — PROGRESS NOTES
"Pt reports pain as high since this morning during assessment.  (Went from 0 score to \"coming back\" and back to 10/10).  RN gave PRN PO tylenol, educated pt that PO Ibuprofen will be available around 1130 - supplied with heat pack for abdominal cramping.  Pt asking if something stronger available for pain, RN educated on normal medication management of vaginal delivery pain but that will ask provider. RN continuing to encourage pt to get oob to attempt another void, pt refusing stating too painful to walk.  Easy to arouse to voice, but falls asleep audibly snoring when not interacted with.    RN spoke with OB Resident around 1000.  They stated noted too her being sleepy and verbalizing pain.  Will make Tylenol scheduled, and encourage drinking/voiding/ambulation.    Throughout afternoon, seems as though pain medications help somewhat but pt still consistently c/o pain.  MD made aware a second time and encouraged continuation of plan and encouraging pt.  "

## 2024-12-20 NOTE — PROGRESS NOTES
Blood sugar is now 119. Moved to algorithm 2 because it has not dropped by at least 10points in 1 hour. Insulin now infusing at 2 units an hour.

## 2024-12-20 NOTE — PROVIDER NOTIFICATION
Discussed limited urine output with Resident OBGYN as well as fasting blood sugar of 137. Resident OBGYN recommends trying to urinate again in another hour and taking the next blood sugar 30mins prior to eating breakfast.

## 2024-12-20 NOTE — PLAN OF CARE
Problem: Labor  Goal: Hemostasis  Outcome: Progressing  Problem: Labor  Goal: Acceptable Pain Control  Outcome: Progressing  Problem: Labor  Goal: Normal Uterine Contraction Pattern  Outcome: Progressing  Vitally stable. Patient rested most of day. Pain managed with position changes and Hydroxyzine. 9th dose of Cytotec given at 1430. Provider updated and requested to start Pitocin. Pitocin started at 1646. Up independently. Continue plan of care.

## 2024-12-20 NOTE — PROGRESS NOTES
Labor Progress Note  2024 8:16 PM  ________________________________________________________________________    ASSESSMENT & PLAN:   29 year old  at 37w1d, admitted for induction for poorly controlled pre-gestational type 2 diabetes, s/p 9 doses of oral misoprostol, now on IV pitocin. SROM with clear fluid. SVE /-2  IV pitocin, titrate as able  Plan labor epidural   Type 2 DM- on insulin NPH 20 units. Start insulin gtt when active labor  GBS negative  ________________________________________________________________________    SUBJECTIVE:  Patient had SROM at  with clear fluid  Feeling strong contractions the last 1 hour      OBJECTIVE:  Patient Vitals for the past 24 hrs:   BP Temp Temp src Resp   24 -- 98.8  F (37.1  C) Oral --   24 1922 118/74 99.5  F (37.5  C) Oral 18   24 1831 116/75 -- -- --   24 1728 99/64 -- -- --   24 1555 102/64 99  F (37.2  C) Oral 16   24 1158 114/70 98.7  F (37.1  C) Oral 18   24 0900 111/71 98.6  F (37  C) Oral 18   24 0518 118/65 99  F (37.2  C) Oral 18   24 0146 117/75 98.5  F (36.9  C) Oral 16     FHR: Baseline: 150 bpm, Variability: Moderate (6 - 25 bpm), Accelerations: Present, Decelerations: no significant, and Category I - baseline FHR of 110 to 160 bpm, moderate FHR variability, and Absence of late or variable FHR decelerations  Uterine Contractions:  regular, every 2-3 minutes.  Cervix: dilation 4 cm , 70% effaced, soft, and -2 station.  Fluid: Clear.  Fetal Presentation: vertex.    Recent Results (from the past 24 hours)   Glucose by meter    Collection Time: 24  5:21 AM   Result Value Ref Range    GLUCOSE BY METER POCT 99 70 - 99 mg/dL   Glucose by meter    Collection Time: 24 10:30 AM   Result Value Ref Range    GLUCOSE BY METER POCT 99 70 - 99 mg/dL   Glucose by meter    Collection Time: 24  3:57 PM   Result Value Ref Range    GLUCOSE BY METER POCT 154 (H) 70 - 99 mg/dL    Glucose by meter    Collection Time: 12/19/24  8:03 PM   Result Value Ref Range    GLUCOSE BY METER POCT 185 (H) 70 - 99 mg/dL      Completed by:   Sully Barney MD  [facility]  12/19/2024 8:16 PM

## 2024-12-20 NOTE — PROGRESS NOTES
Blood sugar remains at 119. Patient moved to algorithm 3 and insulin now infusing at 3 units per hour.

## 2024-12-21 VITALS
HEART RATE: 80 BPM | RESPIRATION RATE: 18 BRPM | SYSTOLIC BLOOD PRESSURE: 130 MMHG | DIASTOLIC BLOOD PRESSURE: 84 MMHG | OXYGEN SATURATION: 98 % | TEMPERATURE: 98.2 F

## 2024-12-21 PROCEDURE — 250N000013 HC RX MED GY IP 250 OP 250 PS 637

## 2024-12-21 PROCEDURE — 99207 PR NO CHARGE LOS: CPT | Performed by: STUDENT IN AN ORGANIZED HEALTH CARE EDUCATION/TRAINING PROGRAM

## 2024-12-21 PROCEDURE — 0UC97ZZ EXTIRPATION OF MATTER FROM UTERUS, VIA NATURAL OR ARTIFICIAL OPENING: ICD-10-PCS | Performed by: STUDENT IN AN ORGANIZED HEALTH CARE EDUCATION/TRAINING PROGRAM

## 2024-12-21 RX ORDER — OXYCODONE HYDROCHLORIDE 5 MG/1
5 TABLET ORAL ONCE
Status: COMPLETED | OUTPATIENT
Start: 2024-12-21 | End: 2024-12-21

## 2024-12-21 RX ORDER — OXYCODONE HYDROCHLORIDE 5 MG/1
5 TABLET ORAL EVERY 6 HOURS PRN
Status: DISCONTINUED | OUTPATIENT
Start: 2024-12-21 | End: 2024-12-21

## 2024-12-21 RX ORDER — KETOROLAC TROMETHAMINE 30 MG/ML
15 INJECTION, SOLUTION INTRAMUSCULAR; INTRAVENOUS EVERY 6 HOURS PRN
Status: DISCONTINUED | OUTPATIENT
Start: 2024-12-21 | End: 2024-12-21 | Stop reason: HOSPADM

## 2024-12-21 RX ORDER — NALOXONE HYDROCHLORIDE 0.4 MG/ML
0.2 INJECTION, SOLUTION INTRAMUSCULAR; INTRAVENOUS; SUBCUTANEOUS
Status: DISCONTINUED | OUTPATIENT
Start: 2024-12-21 | End: 2024-12-21 | Stop reason: HOSPADM

## 2024-12-21 RX ORDER — POLYETHYLENE GLYCOL 3350 17 G/17G
1 POWDER, FOR SOLUTION ORAL DAILY
Qty: 476 G | Refills: 0 | Status: SHIPPED | OUTPATIENT
Start: 2024-12-21

## 2024-12-21 RX ORDER — IBUPROFEN 600 MG/1
600 TABLET, FILM COATED ORAL EVERY 6 HOURS PRN
Qty: 30 TABLET | Refills: 0 | Status: SHIPPED | OUTPATIENT
Start: 2024-12-21

## 2024-12-21 RX ORDER — DOCUSATE SODIUM 100 MG/1
100 CAPSULE, LIQUID FILLED ORAL DAILY
Qty: 30 CAPSULE | Refills: 0 | Status: SHIPPED | OUTPATIENT
Start: 2024-12-21

## 2024-12-21 RX ORDER — NALOXONE HYDROCHLORIDE 0.4 MG/ML
0.4 INJECTION, SOLUTION INTRAMUSCULAR; INTRAVENOUS; SUBCUTANEOUS
Status: DISCONTINUED | OUTPATIENT
Start: 2024-12-21 | End: 2024-12-21 | Stop reason: HOSPADM

## 2024-12-21 RX ORDER — OXYCODONE HYDROCHLORIDE 5 MG/1
5 TABLET ORAL EVERY 6 HOURS PRN
Qty: 5 TABLET | Refills: 0 | Status: SHIPPED | OUTPATIENT
Start: 2024-12-21 | End: 2024-12-23

## 2024-12-21 RX ORDER — ACETAMINOPHEN 325 MG/1
1000 TABLET ORAL EVERY 6 HOURS PRN
Qty: 30 TABLET | Refills: 0 | Status: SHIPPED | OUTPATIENT
Start: 2024-12-21

## 2024-12-21 RX ADMIN — IBUPROFEN 800 MG: 800 TABLET, FILM COATED ORAL at 06:57

## 2024-12-21 RX ADMIN — OXYCODONE 5 MG: 5 TABLET ORAL at 03:12

## 2024-12-21 RX ADMIN — OXYCODONE 5 MG: 5 TABLET ORAL at 09:21

## 2024-12-21 RX ADMIN — ACETAMINOPHEN 650 MG: 325 TABLET ORAL at 13:42

## 2024-12-21 RX ADMIN — ACETAMINOPHEN 650 MG: 325 TABLET ORAL at 06:57

## 2024-12-21 RX ADMIN — DOCUSATE SODIUM 100 MG: 100 CAPSULE, LIQUID FILLED ORAL at 09:21

## 2024-12-21 RX ADMIN — IBUPROFEN 800 MG: 800 TABLET, FILM COATED ORAL at 13:42

## 2024-12-21 ASSESSMENT — ACTIVITIES OF DAILY LIVING (ADL)
ADLS_ACUITY_SCORE: 19
ADLS_ACUITY_SCORE: 18
ADLS_ACUITY_SCORE: 19
ADLS_ACUITY_SCORE: 18
ADLS_ACUITY_SCORE: 19
ADLS_ACUITY_SCORE: 18

## 2024-12-21 NOTE — PLAN OF CARE
Day RN ([id number])    Pt and infant discharged home from hospital at around 1420.  Reviewed discharge instructions with patient and spouse. Questions answered. Patient discharged to home with ride from spouse, plan for picking up discharge meds (ibuprofen, tylenol, oxycodone, miralax, and colace), discharge instructions, purchased pump for home, and belongings.  Carseat provided by family.  Family eager and adequate for discharge.    VSS and afebrile.  Pt experiencing cramping during breastfeeding and nipple discomfort - pain managed adequately with PRN PO tylenol, ibuprofen, and oxycodone.  Intact perineum healing wdl.  Up independently - steady.  Voiding adequately.  Tolerating regular diet well.  Fundus is firm, midline and +1.  Lochia wdl - few clots, MD aware.  Mainly formula feeding baby, is trying to breast feed somewhat - declining help from bedside RN today, RN gave outpatient discharge assistance information.  [place] scored at 14, seen by SW prior to discharge.  Family bonding well.  Spouse and other children at bedside - supportive of patient.  Plan is home with infant and family on discharge.  Will continue to monitor.       Care Plan Problem    Problem: Adult Inpatient Plan of Care  Goal: Optimal Comfort and Wellbeing  Outcome: Met  Intervention: Provide Person-Centered Care  Recent Flowsheet Documentation  Taken 12/21/2024 [id number] by Rhiannon Wick, RN  Trust Relationship/Rapport:   care explained   choices provided   emotional support provided   empathic listening provided   questions answered   questions encouraged   reassurance provided   thoughts/feelings acknowledged

## 2024-12-21 NOTE — PLAN OF CARE
Goal Outcome Evaluation:             VSS. Pain controlled with tylenol, ibuprofen, and oxycodone.  infant one time overnight. Encouraging adequate hydration and regularly emptying bladder. Family in room.            Problem: Adult Inpatient Plan of Care  Goal: Optimal Comfort and Wellbeing  Outcome: Progressing     Problem: Breastfeeding  Goal: Effective Breastfeeding  Outcome: Progressing     Problem: Postpartum (Vaginal Delivery)  Goal: Successful Parent Role Transition  Outcome: Progressing      breastfeeding exclusively

## 2024-12-21 NOTE — DISCHARGE INSTRUCTIONS
Warning Signs after Having a Baby    Keep this paper on your fridge or somewhere else where you can see it.    Call your provider if you have any of these symptoms up to 12 weeks after having your baby.    Thoughts of hurting yourself or your baby  Pain in your chest or trouble breathing  Severe headache not helped by pain medicine  Eyesight concerns (blurry vision, seeing spots or flashes of light, other changes to eyesight)  Fainting, shaking or other signs of a seizure    Call 9-1-1 if you feel that it is an emergency.     The symptoms below can happen to anyone after giving birth. They can be very serious. Call your provider if you have any of these warning signs.    My provider s phone number: _______________________    Losing too much blood (hemorrhage)    Call your provider if you soak through a pad in less than an hour or pass blood clots bigger than a golf ball. These may be signs that you are bleeding too much.    Blood clots in the legs or lungs    After you give birth, your body naturally clots its blood to help prevent blood loss. Sometimes this increased clotting can happen in other areas of the body, like the legs or lungs. This can block your blood flow and be very dangerous.     Call your provider if you:  Have a red, swollen spot on the back of your leg that is warm or painful when you touch it.   Are coughing up blood.     Infection    Call your provider if you have any of these symptoms:  Fever of 100.4 F (38 C) or higher.  Pain or redness around your stitches if you had an incision.   Any yellow, white, or green fluid coming from places where you had stitches or surgery.    Mood Problems (postpartum depression)    Many people feel sad or have mood changes after having a baby. But for some people, these mood swings are worse.     Call your provider right away if you feel so anxious or nervous that you can't care for yourself or your baby.    Preeclampsia (high blood pressure)    Even if you  "didn't have high blood pressure when you were pregnant, you are at risk for the high blood pressure disease called preeclampsia. This risk can last up to 12 weeks after giving birth.     Call your provider if you have:   Pain on your right side under your rib cage  Sudden swelling in the hands and face    Remember: You know your body. If something doesn't feel right, get medical help.     For informational purposes only. Not to replace the advice of your health care provider. Copyright 2020 [place] [employer] [facility]. All rights reserved. Clinically reviewed by Dominique Putnam, RNC-OB, MSN. [employer] [id number] - Rev .      Postpartum Care at Home With Your Baby: Care Instructions  Overview     After childbirth (postpartum period), your body goes through many changes as you recover. In these weeks after delivery, try to take good care of yourself. Get rest whenever you can and accept help from others.  It may take 4 to 6 weeks to feel like yourself again, and possibly longer if you had a  birth. You may feel sore or very tired as you recover. After delivery, you may continue to have contractions as the uterus returns to the size it was before your pregnancy. You will also have some vaginal bleeding. And you may have pain around the vagina as you heal. Several days after delivery you may also have pain and swelling in your breasts as they fill with milk. There are things you can do at home to help ease these discomforts.  After childbirth, it's common to feel emotional. You may feel irritable, cry easily, and feel happy one minute and sad the next. This is called the \"baby blues.\" Hormone changes are one cause of these emotional changes. These feelings usually get better within a couple of weeks. If they don't, talk to your doctor or midwife.  In the first couple of weeks after you give birth, your doctor or midwife may want to check in with you and make a plan for follow-up care. You will likely have a " complete postpartum visit in the first 3 months after delivery. At that time, your doctor or midwife will check on your recovery and see how you're doing. But if you have questions or concerns before then, you can always call your doctor or midwife.  Follow-up care is a key part of your treatment and safety. Be sure to make and go to all appointments, and call your doctor if you are having problems. It's also a good idea to know your test results and keep a list of the medicines you take.  How can you care for yourself at home?  Taking care of your body  Use pads instead of tampons for bleeding. After birth, you will have bloody vaginal discharge. You may also pass some blood clots that shouldn't be bigger than an egg. Over the next 6 weeks or so, your bleeding should decrease a little every day and slowly change to a pinkish and then whitish discharge.  For cramps or mild pain, try an over-the-counter pain medicine, such as acetaminophen (Tylenol) or ibuprofen (Advil, Motrin). Read and follow all instructions on the label.  To ease pain around the vagina or from hemorrhoids:  Put ice or a cold pack on the area for 10 to 20 minutes at a time. Put a thin cloth between the ice and your skin.  Try sitting in a few inches of warm water (sitz bath) when you can or after bowel movements.  Clean yourself with a gentle squeeze of warm water from a bottle instead of wiping with toilet paper.  Use witch hazel or hemorrhoid pads (such as Tucks).  Try using a cold compress for sore and swollen breasts. And wear a supportive bra that fits.  Ease constipation by drinking plenty of fluids and eating high-fiber foods. Ask your doctor or midwife about over-the-counter stool softeners.  Activity  Rest when you can.  Ask for help from family or friends when you need it.  If you can, have another adult in your home for at least 2 or 3 days after birth.  When you feel ready, try to get some exercise every day. For many people, walking  is a good choice. Don't do any heavy exercise until your doctor or midwife says it's okay.  Ask your doctor or midwife when it is okay to have vaginal sex.  If you don't want to get pregnant, talk to your doctor or midwife about birth control options. You can get pregnant even before your period returns. Also, you can get pregnant while you are breastfeeding.  Talk to your doctor or midwife if you want to get pregnant again. They can talk to you about when it is safe.  Emotional health  It's normal to have some sadness, anxiety, and mood swings after delivery. It may help to talk with a trusted friend or family member. You can also call the Maternal Mental Health Hotline at 5-163-[facility]XW-[facility] (1-232.472.2570) for support. If these mood changes last more than a couple of weeks, talk to your doctor or midwife.  When should you call for help?  Share this information with your partner, family, or a friend. They can help you watch for warning signs.  Call 911  anytime you think you may need emergency care. For example, call if:    You feel you cannot stop from hurting yourself, your baby, or someone else.     You passed out (lost consciousness).     You have chest pain, are short of breath, or cough up blood.     You have a seizure.   Where to get help 24 hours a day, 7 days a week   If you or someone you know talks about suicide, self-harm, a mental health crisis, a substance use crisis, or any other kind of emotional distress, get help right away. You can:    Call the Suicide and Crisis Lifeline at 158.     Call 0-763-496-TALK (1-603.537.2841).     Text HOME to 660047 to access the Crisis Text Line.   Consider saving these numbers in your phone.  Go to [employer] for more information or to chat online.  Call your doctor or midwife now or seek immediate medical care if:    You have signs of hemorrhage (too much bleeding), such as:  Heavy vaginal bleeding. This means that you are soaking through one or more pads in an  "hour. Or you pass blood clots bigger than an egg.  Feeling dizzy or lightheaded, or you feel like you may faint.  Feeling so tired or weak that you cannot do your usual activities.  A fast or irregular heartbeat.  New or worse belly pain.     You have signs of infection, such as:  A fever.  Increased pain, swelling, warmth, or redness from an incision or wound.  Frequent or painful urination or blood in your urine.  Vaginal discharge that smells bad.  New or worse belly pain.     You have symptoms of a blood clot in your leg (called a deep vein thrombosis), such as:  Pain in the calf, back of the knee, thigh, or groin.  Swelling in the leg or groin.  A color change on the leg or groin. The skin may be reddish or purplish, depending on your usual skin color.     You have signs of preeclampsia, such as:  Sudden swelling of your face, hands, or feet.  New vision problems (such as dimness, blurring, or seeing spots).  A severe headache.     You have signs of heart failure, such as:  New or increased shortness of breath.  New or worse swelling in your legs, ankles, or feet.  Sudden weight gain, such as more than 2 to 3 pounds in a day or 5 pounds in a week.  Feeling so tired or weak that you cannot do your usual activities.     You had spinal or epidural pain relief and have:  New or worse back pain.  Increased pain, swelling, warmth, or redness at the injection site.  Tingling, weakness, or numbness in your legs or groin.   Watch closely for changes in your health, and be sure to contact your doctor or midwife if:    Your vaginal bleeding isn't decreasing.     You feel sad, anxious, or hopeless for more than a few days.     You are having problems with your breasts or breastfeeding.   Where can you learn more?  Go to https://www.healthwise.net/patiented  Enter Z768 in the search box to learn more about \"Postpartum Care at Home With Your Baby: Care Instructions.\"  Current as of: April 30, 2024  Content Version: 14.3    " 2024 [employer].   Care instructions adapted under license by your healthcare professional. If you have questions about a medical condition or this instruction, always ask your healthcare professional. [employer] disclaims any warranty or liability for your use of this information.

## 2024-12-21 NOTE — PROGRESS NOTES
Patient rating pain 10/10, severe cramping pain. RN helped patient walk to the bathroom to empty bladder and patient given tylenol.   Dr. Rooney notified and asked to change tylenol  650 mg every 6 hours to every 4 hours.

## 2024-12-21 NOTE — DISCHARGE SUMMARY
Post-partum Discharge Summary    Fan Duran MRN# [id number]   Age: 29 year old YOB: 1995     Date of Admission:  2024  Date of Discharge::  2024  Admitting Physician:  Sully Barney MD  Discharge Physician:  Dr. Shelly Roque     [place] clinic: [facility]          Admission Diagnoses:   Pregnancy          Discharge Diagnosis:     Normal spontaneous vaginal delivery  Abdominal pain  Intrauterine pregnancy at 37 weeks gestation  Patient Active Problem List   Diagnosis    Class 1 obesity without serious comorbidity in adult, unspecified BMI, unspecified obesity type    Insulin controlled gestational diabetes mellitus (GDM) in third trimester    Type 2 diabetes mellitus without complication, without long-term current use of insulin (H)    High-risk pregnancy, unspecified trimester    Pregnancy    Encounter for induction of labor     (normal spontaneous vaginal delivery)          Procedures:     Procedure(s): No additional procedures performed       No procedures performed during this admission           Medications Prior to Admission:     Medications Prior to Admission   Medication Sig Dispense Refill Last Dose/Taking    albuterol (PROAIR HFA/PROVENTIL HFA/VENTOLIN HFA) 108 (90 Base) MCG/ACT inhaler Inhale 2 puffs into the lungs every 6 hours as needed for shortness of breath, wheezing or cough. 18 g 6 Past Week    calcium carbonate (TUMS) 500 MG chewable tablet Take 1 tablet (500 mg) by mouth 3 times daily as needed for heartburn. 100 tablet 0 2024 Morning    famotidine (PEPCID) 20 MG tablet Take 20 mg by mouth 2 times daily as needed (heartburn).   2024 Morning    insulin NPH (NOVOLIN N VIAL) 100 UNIT/ML vial Inject 20 Units subcutaneously at bedtime.   2024 Bedtime    insulin pen needle (BD SHARMAINE U/F) 32G X 4 MM miscellaneous Use one pen needle daily or as directed. 50 each 1 2024    insulin syringe-needle  "U-100 (31G X 5/16\" 0.3 ML) 31G X 5/16\" 0.3 ML miscellaneous Use one syringe daily or as directed. 100 each 1 12/17/2024    thin (NO BRAND SPECIFIED) lancets Use with lanceting device. To accompany: Blood Glucose Monitor Brands: per insurance. 200 each 6 12/17/2024    blood glucose (ACCU-CHEK GUIDE) test strip Use to test blood sugar 4 times daily or as directed. 120 strip 3     blood glucose (NO BRAND SPECIFIED) lancets standard Use to test blood sugar 4 times daily or as directed. 200 each 11     blood glucose (NO BRAND SPECIFIED) test strip Use to test blood sugar 4 times daily or as directed. To accompany: Blood Glucose Monitor Brands: per insurance. 200 strip 6     blood glucose monitoring (NO BRAND SPECIFIED) meter device kit Use to test blood sugar 4 times daily or as directed. Preferred blood glucose meter OR supplies to accompany: Blood Glucose Monitor Brands: per insurance. 1 kit 0     Continuous Glucose Sensor (FREESTYLE MAU 3 PLUS SENSOR) MISC Change every 15 days. (Patient not taking: Reported on 10/16/2024) 6 each 1 Unknown             Discharge Medications:     Current Discharge Medication List        CONTINUE these medications which have NOT CHANGED    Details   albuterol (PROAIR HFA/PROVENTIL HFA/VENTOLIN HFA) 108 (90 Base) MCG/ACT inhaler Inhale 2 puffs into the lungs every 6 hours as needed for shortness of breath, wheezing or cough.  Qty: 18 g, Refills: 6    Comments: Pharmacy may dispense brand covered by insurance (Proair, or proventil or ventolin or generic albuterol inhaler)  Associated Diagnoses: Encounter for medication refill      calcium carbonate (TUMS) 500 MG chewable tablet Take 1 tablet (500 mg) by mouth 3 times daily as needed for heartburn.  Qty: 100 tablet, Refills: 0    Associated Diagnoses: Gastroesophageal reflux disease with esophagitis without hemorrhage      famotidine (PEPCID) 20 MG tablet Take 20 mg by mouth 2 times daily as needed (heartburn).      insulin NPH (NOVOLIN N " "VIAL) 100 UNIT/ML vial Inject 20 Units subcutaneously at bedtime.    Associated Diagnoses: Pregnancy, unspecified gestational age      insulin pen needle (BD SHARMAINE U/F) 32G X 4 MM miscellaneous Use one pen needle daily or as directed.  Qty: 50 each, Refills: 1    Associated Diagnoses: Pre-existing diabetes mellitus affecting pregnancy in second trimester, antepartum      insulin syringe-needle U-100 (31G X 5/16\" 0.3 ML) 31G X 5/16\" 0.3 ML miscellaneous Use one syringe daily or as directed.  Qty: 100 each, Refills: 1    Associated Diagnoses: Pregnancy, unspecified gestational age      thin (NO BRAND SPECIFIED) lancets Use with lanceting device. To accompany: Blood Glucose Monitor Brands: per insurance.  Qty: 200 each, Refills: 6    Associated Diagnoses: Type 2 diabetes mellitus without complication, without long-term current use of insulin (H); Pregnancy, unspecified gestational age      !! blood glucose (ACCU-CHEK GUIDE) test strip Use to test blood sugar 4 times daily or as directed.  Qty: 120 strip, Refills: 3    Associated Diagnoses: Pre-existing diabetes mellitus affecting pregnancy in third trimester, antepartum      blood glucose (NO BRAND SPECIFIED) lancets standard Use to test blood sugar 4 times daily or as directed.  Qty: 200 each, Refills: 11    Associated Diagnoses: Pregnancy, unspecified gestational age; Type 2 diabetes mellitus without complication, without long-term current use of insulin (H)      !! blood glucose (NO BRAND SPECIFIED) test strip Use to test blood sugar 4 times daily or as directed. To accompany: Blood Glucose Monitor Brands: per insurance.  Qty: 200 strip, Refills: 6    Associated Diagnoses: Type 2 diabetes mellitus without complication, without long-term current use of insulin (H); Pregnancy, unspecified gestational age      blood glucose monitoring (NO BRAND SPECIFIED) meter device kit Use to test blood sugar 4 times daily or as directed. Preferred blood glucose meter OR supplies to " accompany: Blood Glucose Monitor Brands: per insurance.  Qty: 1 kit, Refills: 0    Associated Diagnoses: Type 2 diabetes mellitus without complication, without long-term current use of insulin (H); Pregnancy, unspecified gestational age      Continuous Glucose Sensor (FREESTYLE MAU 3 PLUS SENSOR) MISC Change every 15 days.  Qty: 6 each, Refills: 1    Associated Diagnoses: Pre-existing diabetes mellitus affecting pregnancy in second trimester, antepartum       !! - Potential duplicate medications found. Please discuss with provider.                Consultations:   No consultations were requested during this admission          Brief History of Labor:   On 2024 at 1:40 AM, this 29 year old year old  under Epidural analgesia delivered a viable male infant weighing 7 lbs 15.69 oz with APGAR scores below:  APGARs 1 Min 5Min 10Min   Totals: 7   8                 Hospital Course (HPI):   The patient's hospital course was remarkable for ongoing cramping abdominal pain with minimal relief of tylenol and ibuprofen.  There were no lacerations or repairs required during delivery.  Toradol and PRN oxycodone was utilized while admitted. Fundal height was +1 above umbilicus and postpartum day 0 and +1 on postpartum day 1.  Aggressive uterine massage on postpartum day 1 with firm uterus and small blood clot out but with no vaginal drainage or discharge and only tenderness with aggressive fundal massage and at vaginal introitus initially.  Vaginal bleeding present but decreasing  No fever and vital signs remained stable and within normal limits.  Voiding without difficulty. Had not had a stool at time of discharge with constipation possibly contributing to abdominal pain as well. Ambulating well and tolerating a normal diet.  Return precautions reviewed with patient in detail including fever, chills, worsening abdominal pain, vaginal discharge, worsening vaginal bleeding, among others.    Mother was tired and  uncomfortable postpartum day 0 and there was concern that she had decreased engagement with  however postpartum day 1 patient was more alert, more engaged, and feels well supported by partner and patient's mother who is supporting the patient at home with her other children.  Patient denies any history of postpartum depression.    Patient instructed to discontinue NPH insulin which she was on prior to admission and check fasting sugars with close follow-up planned with her PCP.  A1c on admission 6.6 with history of pre-existing type 2 diabetes this pregnancy.    Discussed close follow-up with primary clinic.  Patient discharged with Tylenol, ibuprofen, small supply of oxycodone as needed, Colace, and MiraLAX.    Infant is stable. Both breast and formula    She was discharged on post-partum day #1.   Contraception plan: Depo injection in clinic  Post partum depression education was provided.         D/C Physical Exam:     Vitals:    24 1309 24 1659 24 2036 24 0223   BP: 128/85 122/79 112/65 120/71   BP Location: Right arm Right arm Right arm Right arm   Patient Position: Semi-Kaur's Semi-Kaur's Semi-Kaur's Semi-Kaur's   Cuff Size:  Adult Large Adult Large Adult Large   Pulse: 66 80     Resp: 17 18  20   Temp: 98  F (36.7  C) 98.1  F (36.7  C)  97.6  F (36.4  C)   TempSrc: Oral Oral  Oral   SpO2:   97% 98%   GENERAL:         healthy, alert, and no distress  RESPIRATORY:   No increased work of breathing, good air exchange, clear to auscultation bilaterally, no crackles or wheezing   CARDIOVASCULAR:   Normal regular rate and rhythm, normal S1 and S2, no S3 or S4, and no murmur noted   ABDOMEN:   No scars, normal bowel sounds, soft, non-distended, non-tender, no masses palpated, no hepatosplenomegally  Fundal height at level of umbilicus + 1 cm.  Firm and moderately tender to deep palpation.   EXTREMITIES:          No edema, non-tender     Post-partum hemoglobin:   Hemoglobin   Date  Value Ref Range Status   2024 11.9 11.7 - 15.7 g/dL Final           Discharge Instructions and Follow-Up:     Discharge diet: Regular   Discharge activity: Activity as tolerated   Discharge follow-up: Follow up with primary care provider in 2 days, bring fasting sugar log   Wound care: None          Discharge Disposition:     Discharged to home        Contraception plan: depo-provera.    Patient discussed with attending physician, Dr. Shelly Roque, who agrees with the plan.    lAeisha Donald MD PGY-3  2024  [facility] Family Medicine Residency Program    Name:  Fan Duran  :  1995  MRN:  [id number]

## 2024-12-21 NOTE — PROVIDER NOTIFICATION
Patient rating pain as severe and cramping pain despite ibuprofen and tylenol, heat therapy, abdominal binder, and regular bladder emptying.  Dr. Rooney notified and order for q 6 hour oxycodone 5 mg given.    08-Aug-2018 14:06

## 2024-12-21 NOTE — CONSULTS
MATERNITY ASSESSMENT:    MRN: [id number]  Patient: Fan Duran  : 1995    Who Do You Live With: Minor children, Mother, and Spouse in a house     Number of Children and Ages: 3 other children ages 12. 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  Parent resource list given,     Home Safety:   Physical Signs of Abuse Present: no    Mental Health Concerns: No    Current Emotions: MOB states that she has Post Partum depression with first baby.  MOB states that she was young and had anxiety.  MOB said that it got better with time.  MOB did not experience with the last 2 children.     Substance Use History: N/A    Summary:  [facility] met and introduced self to MOB, FOB and 7 year old son who was visiting.  [facility] met with MOB about elevated [place] score.  MOB feels supported emotionally.  MOB does feel that she needs to see a therapist and is currently dealing with pain from child birth.  [facility] gave MOB information on Post Partum and New Parents Resource list.  MOB plans to apply for [facility] assistance.      KIANA Manzano  2024 4:22 PM

## 2024-12-23 ENCOUNTER — PATIENT OUTREACH (OUTPATIENT)
Dept: CARE COORDINATION | Facility: CLINIC | Age: 29
End: 2024-12-23

## 2024-12-23 NOTE — TELEPHONE ENCOUNTER
Medication Question or Refill        What medication are you calling about (include dose and sig)?: oxyCodone 5MG    Preferred Pharmacy:  [facility] - [place], [place] - [address]  [address]  [address]  [place] [address]  Phone: [phone] Fax: [phone]        Controlled Substance Agreement on file:   CSA -- Patient Level:    CSA: None found at the patient level.       Who prescribed the medication?: ED    Do you need a refill? Yes    When did you use the medication last? 12/22/2024    Patient offered an appointment? No    Do you have any questions or concerns?  No      Could we send this information to you in [employer] [place] or would you prefer to receive a phone call?:   Patient would prefer a phone call   Okay to leave a detailed message?: Yes at Cell number on file:    Telephone Information:   Mobile [phone]

## 2024-12-23 NOTE — TELEPHONE ENCOUNTER
RN team - can you please call and ask patient how her pain is and if she is using ibuprofen and tylenol? Usually we recommend tylenol and ibuprofen for cramping pain. How is her bleeding?  Danny Burnham MD  Family Medicine with Obstetrics  [facility]  12/23/24  5:43 PM

## 2024-12-23 NOTE — PROGRESS NOTES
Clinic Care Coordination Contact  Care Coordination Clinician Chart Review    Situation: Patient chart reviewed by care coordinator.    Background: Clinic Care Coordination Referral received from inpatient care team for transition handoff communication following hospital admission.    Assessment: Upon chart review, patient is not a candidate for Primary Care Clinic Care Coordination enrollment due to reason stated below:  Patient was admitted to the hospital for a scheduled .  FRW is active.    Plan/Recommendations: Clinic Care Coordination Referral/order cancelled. RN/SW CC will perform no further monitoring/outreaches at this time and will remain available as needed. If new needs arise, a new Care Coordination Referral may be placed.

## 2024-12-26 ENCOUNTER — MEDICAL CORRESPONDENCE (OUTPATIENT)
Dept: HEALTH INFORMATION MANAGEMENT | Facility: CLINIC | Age: 29
End: 2024-12-26

## 2024-12-26 RX ORDER — OXYCODONE HYDROCHLORIDE 5 MG/1
5 TABLET ORAL EVERY 6 HOURS PRN
Qty: 10 TABLET | Refills: 0 | Status: SHIPPED | OUTPATIENT
Start: 2024-12-26

## 2024-12-26 NOTE — TELEPHONE ENCOUNTER
Spoke with patient during her son's  check. She states she has had cramping pain in her belly, low back, and right leg that is not controlled with ibuprofen or tylenol. Bleeding is like a period - no large blood clots. No fevers. She states oxycodone was helpful and let her sleep. Will give 10 pills. She has follow up with Dr. Barney on . Reviewed reasons to call or seek urgent or emergent care. Patient expressed understanding.  PDMP reviewed, consistent.

## 2025-01-02 ENCOUNTER — PATIENT OUTREACH (OUTPATIENT)
Dept: CARE COORDINATION | Facility: CLINIC | Age: 30
End: 2025-01-02

## 2025-01-02 ENCOUNTER — PRENATAL OFFICE VISIT (OUTPATIENT)
Dept: FAMILY MEDICINE | Facility: CLINIC | Age: 30
End: 2025-01-02

## 2025-01-02 VITALS
BODY MASS INDEX: 34.79 KG/M2 | OXYGEN SATURATION: 98 % | HEART RATE: 96 BPM | HEIGHT: 60 IN | SYSTOLIC BLOOD PRESSURE: 112 MMHG | TEMPERATURE: 98.1 F | RESPIRATION RATE: 18 BRPM | WEIGHT: 177.19 LBS | DIASTOLIC BLOOD PRESSURE: 84 MMHG

## 2025-01-02 DIAGNOSIS — Z86.32 HISTORY OF GESTATIONAL DIABETES: ICD-10-CM

## 2025-01-02 PROBLEM — Z34.90 PREGNANCY: Status: RESOLVED | Noted: 2024-12-18 | Resolved: 2025-01-02

## 2025-01-02 PROBLEM — O09.90 HIGH-RISK PREGNANCY, UNSPECIFIED TRIMESTER: Status: RESOLVED | Noted: 2024-11-13 | Resolved: 2025-01-02

## 2025-01-02 PROBLEM — Z34.90 ENCOUNTER FOR INDUCTION OF LABOR: Status: RESOLVED | Noted: 2024-12-19 | Resolved: 2025-01-02

## 2025-01-02 PROBLEM — E11.9 TYPE 2 DIABETES MELLITUS WITHOUT COMPLICATION, WITHOUT LONG-TERM CURRENT USE OF INSULIN (H): Status: RESOLVED | Noted: 2024-03-29 | Resolved: 2025-01-02

## 2025-01-02 ASSESSMENT — EDINBURGH POSTNATAL DEPRESSION SCALE (EPDS)
I HAVE BEEN ANXIOUS OR WORRIED FOR NO GOOD REASON: NO, NOT AT ALL
I HAVE BLAMED MYSELF UNNECESSARILY WHEN THINGS WENT WRONG: YES, SOME OF THE TIME
I HAVE FELT SAD OR MISERABLE: NO, NOT AT ALL
I HAVE LOOKED FORWARD WITH ENJOYMENT TO THINGS: RATHER LESS THAN I USED TO
THE THOUGHT OF HARMING MYSELF HAS OCCURRED TO ME: NEVER
THINGS HAVE BEEN GETTING ON TOP OF ME: YES, SOMETIMES I HAVEN'T BEEN COPING AS WELL AS USUAL
I HAVE BEEN ANXIOUS OR WORRIED FOR NO GOOD REASON: NO, NOT AT ALL
THE THOUGHT OF HARMING MYSELF HAS OCCURRED TO ME: NEVER
I HAVE BEEN ABLE TO LAUGH AND SEE THE FUNNY SIDE OF THINGS: NOT AT ALL
TOTAL SCORE: 11
I HAVE BEEN SO UNHAPPY THAT I HAVE BEEN CRYING: ONLY OCCASIONALLY
I HAVE BEEN SO UNHAPPY THAT I HAVE HAD DIFFICULTY SLEEPING: YES, SOMETIMES
I HAVE BEEN SO UNHAPPY THAT I HAVE HAD DIFFICULTY SLEEPING: YES, SOMETIMES
I HAVE BEEN ABLE TO LAUGH AND SEE THE FUNNY SIDE OF THINGS: NOT AT ALL
I HAVE BEEN SO UNHAPPY THAT I HAVE BEEN CRYING: ONLY OCCASIONALLY
I HAVE FELT SCARED OR PANICKY FOR NO GOOD REASON: NO, NOT AT ALL
I HAVE LOOKED FORWARD WITH ENJOYMENT TO THINGS: RATHER LESS THAN I USED TO
I HAVE FELT SCARED OR PANICKY FOR NO GOOD REASON: NO, NOT AT ALL
THINGS HAVE BEEN GETTING ON TOP OF ME: YES, SOMETIMES I HAVEN'T BEEN COPING AS WELL AS USUAL
I HAVE FELT SAD OR MISERABLE: NO, NOT AT ALL
I HAVE BLAMED MYSELF UNNECESSARILY WHEN THINGS WENT WRONG: YES, SOME OF THE TIME

## 2025-01-02 NOTE — PROGRESS NOTES
Assessment/Plan:     Fan was seen today for postpartum care.    Diagnoses and all orders for this visit:    Routine postpartum follow-up        Anemia:  Normal hemoglobin at [facility]  Breastfeeding/Bottle feeding:  Pt is breasfeeding, discussed how to support  Contraception:   Plans depo- at 6 week postpartum visit  Depression:   Reports mood has improved in the last week- declines referral for therapy or medication at this time.  Exercise:   Encouraged increasing exercise based on how she is feeling.  Healthcare maintenance:   Up to date  Immunizations:    Immunizations up to date    History of gestational diabetes: insulin dependent- she is no longer on insulin and fasting sugars all 70-90's. Will recheck 2 hour glucose tolerance test or A1C at postpartum visit.           Subjective:      Fan Duran is a 29 year old female who presents for a postpartum visit.   She is 2 weeks postpartum following a spontaneous vaginal delivery.   I have fully reviewed the prenatal and intrapartum course.   Delivery notes below  Postpartum course has been complicated by abdominal/lower pelvic pain requiring course of opioids- her pain has improved significantly and she is ambulating .  Baby's course has been Uncomplicated.  Periods:  Still bleeding No LMP recorded (lmp unknown).   Bowel or bladder concerns: denies  Patient has not resumed intercourse.    [place]  Depression Scale: 1st week was difficult with feeling overwhelmed and her youngest daughter being clingy to her, but she is now coping better with her 's support and does not feel she needs medication or therapy at this time.    Last hgb on file:    Lab Results   Component Value Date    HGB 11.9 2024        The following portions of the patient's history were reviewed and updated as appropriate: allergies, current medications and problem list     OB History    Para Term  AB Living   5 4 4 0 1 4   SAB IAB Ectopic Multiple  "Live Births   1 0 0 0 4      # Outcome Date GA Lbr Mauricio/2nd Weight Sex Type Anes PTL Lv   5 Term 24 37w2d 03:50 :48 3.62 kg (7 lb 15.7 oz) M Vag-Spont EPI N FATMATA      Name: Pascale Jarrett      Apgar1: 7  Apgar5: 8   4 Term 08/10/22 40w4d 02:04 :28 3.35 kg (7 lb 6.2 oz) F Vag-Spont None N FATMATA      Name: Gifty Montemayor Say Paw      Apgar1: 8  Apgar5: 9   3 SAB 19           2 Term 17   3.175 kg (7 lb) M Vag-Spont   FATMATA      Birth Comments: Born in [facility]      Name: Chetan Luiza   1 Term 12   2.948 kg (6 lb 8 oz) F Vag-Vacuum   FATMATA      Birth Comments: [facility]. Vacuum assisted delivery for failure to progress in 2nd stage      Name: Rozina Eh Say Paw     Information for the patient's :  Pascale Parikh [[id number]]   Divine Bria Say Luiza    Objective:      /84   Pulse 96   Temp 98.1  F (36.7  C) (Oral)   Resp 18   Ht 1.53 m (5' 0.24\")   Wt 80.4 kg (177 lb 3 oz)   LMP  (LMP Unknown)   SpO2 98%   Breastfeeding Yes   BMI 34.33 kg/m      Physical Exam:  General Appearance: Alert, cooperative, no distress, appears stated age  Abdomen: Soft, non-tender, no masses         "

## 2025-01-06 ENCOUNTER — PATIENT OUTREACH (OUTPATIENT)
Dept: CARE COORDINATION | Facility: CLINIC | Age: 30
End: 2025-01-06

## 2025-01-06 NOTE — PROGRESS NOTES
FRW UPDATE :  1/6/25 - Patient returned FRW's phone call. Her case was closed due to not turning in verifications. Patient stated that she turned them in but she was still denied. She does not want to apply again. Plan: FRW closed the FRW program and remove patient from panel. Patient can be referred back to [facility] if needed.

## 2025-01-06 NOTE — PROGRESS NOTES
FRW UPDATE :  1/6/25 - Established outreach attempted x 2. Left message on voicemail indicating last outreach attempt.   Plan: FRW closed the FRW program, FAM Case, FAM Tracker and will send an unreachable letter. Patient can be referred back to FRW if needed.

## 2025-01-22 ENCOUNTER — MEDICAL CORRESPONDENCE (OUTPATIENT)
Dept: HEALTH INFORMATION MANAGEMENT | Facility: CLINIC | Age: 30
End: 2025-01-22

## 2025-01-29 DIAGNOSIS — E66.811 CLASS 1 OBESITY WITHOUT SERIOUS COMORBIDITY IN ADULT, UNSPECIFIED BMI, UNSPECIFIED OBESITY TYPE: ICD-10-CM

## 2025-01-29 DIAGNOSIS — Z86.32 HISTORY OF GESTATIONAL DIABETES: ICD-10-CM

## 2025-01-29 DIAGNOSIS — Z30.42 DEPO-PROVERA CONTRACEPTIVE STATUS: Primary | ICD-10-CM

## 2025-01-30 ENCOUNTER — PRENATAL OFFICE VISIT (OUTPATIENT)
Dept: FAMILY MEDICINE | Facility: CLINIC | Age: 30
End: 2025-01-30
Payer: COMMERCIAL

## 2025-01-30 VITALS
HEART RATE: 75 BPM | DIASTOLIC BLOOD PRESSURE: 68 MMHG | OXYGEN SATURATION: 98 % | SYSTOLIC BLOOD PRESSURE: 102 MMHG | RESPIRATION RATE: 18 BRPM | WEIGHT: 179.13 LBS | BODY MASS INDEX: 35.17 KG/M2 | TEMPERATURE: 97.9 F | HEIGHT: 60 IN

## 2025-01-30 DIAGNOSIS — Z30.42 DEPO-PROVERA CONTRACEPTIVE STATUS: ICD-10-CM

## 2025-01-30 DIAGNOSIS — L70.9 ACNE, UNSPECIFIED ACNE TYPE: ICD-10-CM

## 2025-01-30 DIAGNOSIS — E11.9 TYPE 2 DIABETES MELLITUS WITHOUT COMPLICATION, WITHOUT LONG-TERM CURRENT USE OF INSULIN (H): ICD-10-CM

## 2025-01-30 DIAGNOSIS — E66.811 CLASS 1 OBESITY WITHOUT SERIOUS COMORBIDITY IN ADULT, UNSPECIFIED BMI, UNSPECIFIED OBESITY TYPE: ICD-10-CM

## 2025-01-30 PROBLEM — Z86.32 HISTORY OF GESTATIONAL DIABETES: Status: RESOLVED | Noted: 2022-10-05 | Resolved: 2025-01-30

## 2025-01-30 LAB
ANION GAP SERPL CALCULATED.3IONS-SCNC: 12 MMOL/L (ref 7–15)
BUN SERPL-MCNC: 18.2 MG/DL (ref 6–20)
CALCIUM SERPL-MCNC: 10.2 MG/DL (ref 8.8–10.4)
CHLORIDE SERPL-SCNC: 106 MMOL/L (ref 98–107)
CHOLEST SERPL-MCNC: 250 MG/DL
CREAT SERPL-MCNC: 0.61 MG/DL (ref 0.51–0.95)
EGFRCR SERPLBLD CKD-EPI 2021: >90 ML/MIN/1.73M2
EST. AVERAGE GLUCOSE BLD GHB EST-MCNC: 131 MG/DL
FASTING STATUS PATIENT QL REPORTED: YES
FASTING STATUS PATIENT QL REPORTED: YES
GLUCOSE SERPL-MCNC: 129 MG/DL (ref 70–99)
HBA1C MFR BLD: 6.2 % (ref 0–5.6)
HCG UR QL: NEGATIVE
HCO3 SERPL-SCNC: 23 MMOL/L (ref 22–29)
HDLC SERPL-MCNC: 49 MG/DL
LDLC SERPL CALC-MCNC: 133 MG/DL
NONHDLC SERPL-MCNC: 201 MG/DL
POTASSIUM SERPL-SCNC: 4.1 MMOL/L (ref 3.4–5.3)
SODIUM SERPL-SCNC: 141 MMOL/L (ref 135–145)
TRIGL SERPL-MCNC: 338 MG/DL

## 2025-01-30 RX ORDER — MEDROXYPROGESTERONE ACETATE 150 MG/ML
150 INJECTION, SUSPENSION INTRAMUSCULAR
Status: ACTIVE | OUTPATIENT
Start: 2025-01-30 | End: 2026-01-25

## 2025-01-30 RX ORDER — CLINDAMYCIN PHOSPHATE 10 MG/G
GEL TOPICAL 2 TIMES DAILY
Qty: 60 G | Refills: 11 | Status: SHIPPED | OUTPATIENT
Start: 2025-01-30

## 2025-01-30 RX ADMIN — MEDROXYPROGESTERONE ACETATE 150 MG: 150 INJECTION, SUSPENSION INTRAMUSCULAR at 09:59

## 2025-01-30 ASSESSMENT — EDINBURGH POSTNATAL DEPRESSION SCALE (EPDS)
I HAVE FELT SCARED OR PANICKY FOR NO GOOD REASON: NO, NOT AT ALL
I HAVE BEEN SO UNHAPPY THAT I HAVE HAD DIFFICULTY SLEEPING: NOT AT ALL
I HAVE BEEN SO UNHAPPY THAT I HAVE BEEN CRYING: ONLY OCCASIONALLY
I HAVE BLAMED MYSELF UNNECESSARILY WHEN THINGS WENT WRONG: YES, SOME OF THE TIME
THINGS HAVE BEEN GETTING ON TOP OF ME: NO, I HAVE BEEN COPING AS WELL AS EVER
TOTAL SCORE: 9
THE THOUGHT OF HARMING MYSELF HAS OCCURRED TO ME: NEVER
I HAVE LOOKED FORWARD WITH ENJOYMENT TO THINGS: RATHER LESS THAN I USED TO
I HAVE FELT SAD OR MISERABLE: NO, NOT AT ALL
I HAVE BEEN ABLE TO LAUGH AND SEE THE FUNNY SIDE OF THINGS: NOT AT ALL
I HAVE BEEN ANXIOUS OR WORRIED FOR NO GOOD REASON: YES, SOMETIMES

## 2025-01-30 NOTE — PROGRESS NOTES
Assessment/Plan:     Fan was seen today for post partum exam.    Diagnoses and all orders for this visit:    Routine postpartum follow-up    Type 2 diabetes mellitus without complication, without long-term current use of insulin (H): A1C at goal- diet controlled prior to pregnancy. Not on medications currently. Recheck A1C in 6 months.     Class 1 obesity without serious comorbidity in adult, unspecified BMI, unspecified obesity type: check labs.  -     Basic metabolic panel  (Ca, Cl, CO2, Creat, Gluc, K, Na, BUN)  -     Lipid panel reflex to direct LDL Fasting    Depo-Provera contraceptive status:No intercourse since delivery. UPT negative. Depo given.  -     HCG qualitative urine  -     medroxyPROGESTERone (DEPO-PROVERA) injection 150 mg    Acne, unspecified acne type: Using OTC cleanser with benzoyl peroxide. Add topical antibiotic.   -     clindamycin (CLEOCIN-T) 1 % external gel; Apply topically 2 times daily.    Other orders  -     Hemoglobin A1c      Anemia:  Normal antepartum hemoglobin with no excessive blood loss  Breastfeeding/Bottle feeding:  Pt is breastfeeding  Contraception:   depo  Depression:   See [facility] Depresion survey  Exercise:   Encouraged increasing exercise based on how she is feeling.  Healthcare maintenance:   Up to date  Immunizations:    Immunizations up to date               Subjective:      Fan Duran is a 30 year old female who presents for a postpartum visit.   She is postpartum following a spontaneous vaginal delivery.   I have fully reviewed the prenatal and intrapartum course.   Delivery notes below  Postpartum course has been Unremarkable.  Baby's course has been Uncomplicated.  Periods:  None No LMP recorded (lmp unknown).   Bowel or bladder concerns: denies  Patient has not resumed intercourse.    [place]  Depression Scale: see flowsheet    Last hgb on file:    Lab Results   Component Value Date    HGB 11.9 2024        The following  "portions of the patient's history were reviewed and updated as appropriate: allergies, current medications and problem list     OB History    Para Term  AB Living   5 4 4 0 1 4   SAB IAB Ectopic Multiple Live Births   1 0 0 0 4      # Outcome Date GA Lbr Mauricio/2nd Weight Sex Type Anes PTL Lv   5 Term 24 37w2d 03:50 / 01:48 3.62 kg (7 lb 15.7 oz) M Vag-Spont EPI N FATMATA      Name: Divine Bria Say Taw      Apgar1: 7  Apgar5: 8   4 Term 08/10/22 40w4d 02:04 :28 3.35 kg (7 lb 6.2 oz) F Vag-Spont None N FATMATA      Name: Gifty Montemayor Say Paw      Apgar1: 8  Apgar5: 9   3 SAB 19           2 Term 17   3.175 kg (7 lb) M Vag-Spont   FATMATA      Birth Comments: Born in [facility]      Name: Chetan Luiza   1 Term 12   2.948 kg (6 lb 8 oz) F Vag-Vacuum   FATMATA      Birth Comments: [facility]. Vacuum assisted delivery for failure to progress in 2nd stage      Name: Rozina Eh Say Paw     Information for the patient's :  Say Luiza, Divine Bria [[id number]]   Divine Bria Say Taw    Objective:      /68   Pulse 75   Temp 97.9  F (36.6  C) (Oral)   Resp 18   Ht 1.52 m (4' 11.84\")   Wt 81.3 kg (179 lb 2 oz)   LMP  (LMP Unknown)   SpO2 98%   Breastfeeding Yes   BMI 35.17 kg/m      Physical Exam:  General Appearance: Alert, cooperative, no distress, appears stated age  Lungs: Clear to auscultation bilaterally, respirations unlabored  Heart: Regular rate and rhythm, S1 and S2 normal, no murmur, rub, or gallop  Abdomen: Soft, non-tender, no masses, no organomegaly  Pelvic: declined by patient         "

## 2025-02-15 ENCOUNTER — HEALTH MAINTENANCE LETTER (OUTPATIENT)
Age: 30
End: 2025-02-15

## 2025-03-04 ENCOUNTER — MEDICAL CORRESPONDENCE (OUTPATIENT)
Dept: HEALTH INFORMATION MANAGEMENT | Facility: CLINIC | Age: 30
End: 2025-03-04
Payer: COMMERCIAL

## 2025-03-04 DIAGNOSIS — O91.03 NIPPLE INFECTION ASSOCIATED WITH LACTATION: Primary | ICD-10-CM

## 2025-03-04 RX ORDER — FLUCONAZOLE 100 MG/1
100 TABLET ORAL DAILY
Qty: 7 TABLET | Refills: 0 | Status: SHIPPED | OUTPATIENT
Start: 2025-03-04 | End: 2025-03-11

## 2025-04-10 ENCOUNTER — NURSE TRIAGE (OUTPATIENT)
Dept: FAMILY MEDICINE | Facility: CLINIC | Age: 30
End: 2025-04-10
Payer: COMMERCIAL

## 2025-04-10 NOTE — TELEPHONE ENCOUNTER
"Patient calling with concerns: Right-sided breast pain (nipple)  Breastfeeding  Pain started 3 days ago  Endorses localized redness, warmth.  Endorses chills/sweats  Denies fever  Denies abnormal discharge  Aggravated by breast feeding, palpation  Patient was treated for nipple infection 3/4/25.    Recommended:  Urgent care - patient declines, will go if pain \"gets really bad\" but prefers clinic appointment.  Scheduled with Dr. Trujillo 4/11 at 1430  Reviewed signs of worsening infection - seek immediate care if severe pain or red flag symptoms.    Patient verbalizes understanding and agreement with plan.    Sully Dyson RN  [facility]    Reason for Disposition   Breast looks infected (spreading redness, feels hot or painful to touch) and no fever    Additional Information   Negative: Chest pain   Negative: Breastfeeding questions about baby   Negative: Breastfeeding questions about mother (breast symptoms or feeling sick)   Negative: Breastfeeding questions about mother's medicines and diet   Negative: Postpartum breast pain and swelling, not going to continue breastfeeding / pumping   Negative: Small spot, skin growth or mole   Negative: SEVERE breast pain and fever > 103 F  (39.4 C)   Negative: Patient sounds very sick or weak to the triager   Negative: Breast looks infected (spreading redness, feels hot or painful to touch) and fever    Protocols used: Breast Symptoms-A-[place]    "

## 2025-04-17 ENCOUNTER — ALLIED HEALTH/NURSE VISIT (OUTPATIENT)
Dept: FAMILY MEDICINE | Facility: CLINIC | Age: 30
End: 2025-04-17
Payer: COMMERCIAL

## 2025-04-17 DIAGNOSIS — Z30.42 DEPO-PROVERA CONTRACEPTIVE STATUS: Primary | ICD-10-CM

## 2025-04-17 RX ADMIN — MEDROXYPROGESTERONE ACETATE 150 MG: 150 INJECTION, SUSPENSION INTRAMUSCULAR at 13:11

## 2025-04-17 NOTE — PROGRESS NOTES
Clinic Administered Medication Documentation      Depo Provera Documentation    Depo-Provera Standing Order inclusion/exclusion criteria reviewed.     Is this the initial or subsequent dose of Depo Provera? Subsequent dose - patient is within the acceptable window of time (11-15 weeks) for subsequent injection. Pregnancy test not indicated.    Patient meets: inclusion criteria     Is there an active order (written within the past 365 days, with administrations remaining, not ) in the chart? Yes.     Prior to injection, verified patient identity using patient's name and date of birth. Medication was administered. Please see MAR and medication order for additional information.     Vial/Syringe: Single dose vial. Was entire vial of medication used? Yes    Patient instructed to remain in clinic for 15 minutes and report any adverse reaction to staff immediately.  NEXT INJECTION DUE: 7/3/25 - 25    Verified that the patient has refills remaining in their prescription.

## 2025-04-22 DIAGNOSIS — O92.29 POSTPARTUM NIPPLE PAIN: ICD-10-CM

## 2025-04-29 ENCOUNTER — TELEPHONE (OUTPATIENT)
Dept: FAMILY MEDICINE | Facility: CLINIC | Age: 30
End: 2025-04-29
Payer: COMMERCIAL

## 2025-04-29 NOTE — TELEPHONE ENCOUNTER
Patient Quality Outreach    Patient is due for the following:   Diabetes -  Diabetic Follow-Up Visit  Physical Preventive Adult Physical      Topic Date Due    Pneumococcal Vaccine (1 of 2 - PCV) Never done    COVID-19 Vaccine (4 - 2024-25 season) 09/01/2024       Action(s) Taken:   Schedule a office visit for DM or  Adult Preventative    Type of outreach:    Sent [employer] message.    Questions for provider review:    None         Sully Coy MA  Chart routed to None.

## 2025-04-29 NOTE — LETTER
April 29, 2025    Fan Duran  [address] [place] [address]    Hello,     Your care team at [facility] values your health and well-being. After reviewing your chart, we have identified recommendation(s) to help you better manage your health.    It's time for a follow-up visit to manage your Diabetes or Physical    If you recently had or are having any of these services soon, please contact the clinic via phone or [facility] so that your care team can update your records.    We look forward to seeing you at your upcoming visit.    If you have any questions or concerns, please contact our clinic. Thank you for continuing to trust us with your care.    Sincerely,    Your [facility] Care Team

## 2025-05-17 ENCOUNTER — HEALTH MAINTENANCE LETTER (OUTPATIENT)
Age: 30
End: 2025-05-17

## 2025-07-01 DIAGNOSIS — Z11.1 SCREENING EXAMINATION FOR PULMONARY TUBERCULOSIS: ICD-10-CM

## 2025-07-01 DIAGNOSIS — E11.9 TYPE 2 DIABETES MELLITUS WITHOUT COMPLICATION, WITHOUT LONG-TERM CURRENT USE OF INSULIN (H): Primary | ICD-10-CM

## 2025-07-17 ENCOUNTER — ALLIED HEALTH/NURSE VISIT (OUTPATIENT)
Dept: FAMILY MEDICINE | Facility: CLINIC | Age: 30
End: 2025-07-17
Payer: COMMERCIAL

## 2025-07-17 DIAGNOSIS — Z11.1 SCREENING EXAMINATION FOR PULMONARY TUBERCULOSIS: ICD-10-CM

## 2025-07-17 DIAGNOSIS — Z30.42 DEPO-PROVERA CONTRACEPTIVE STATUS: Primary | ICD-10-CM

## 2025-07-17 DIAGNOSIS — E11.9 TYPE 2 DIABETES MELLITUS WITHOUT COMPLICATION, WITHOUT LONG-TERM CURRENT USE OF INSULIN (H): ICD-10-CM

## 2025-07-17 LAB
EST. AVERAGE GLUCOSE BLD GHB EST-MCNC: 157 MG/DL
HBA1C MFR BLD: 7.1 % (ref 0–5.6)

## 2025-07-17 RX ADMIN — MEDROXYPROGESTERONE ACETATE 150 MG: 150 INJECTION, SUSPENSION INTRAMUSCULAR at 16:17

## 2025-07-17 NOTE — PROGRESS NOTES
Clinic Administered Medication Documentation      Depo Provera Documentation    Depo-Provera Standing Order inclusion/exclusion criteria reviewed.     Is this the initial or subsequent dose of Depo Provera? Subsequent dose - patient is within the acceptable window of time (11-15 weeks) for subsequent injection. Pregnancy test not indicated.    Patient meets: inclusion criteria     Is there an active order (written within the past 365 days, with administrations remaining, not ) in the chart? Yes.     Prior to injection, verified patient identity using patient's name and date of birth. Medication was administered. Please see MAR and medication order for additional information.     Vial/Syringe: Single dose vial. Was entire vial of medication used? Yes    Patient instructed to remain in clinic for 15 minutes and report any adverse reaction to staff immediately.  NEXT INJECTION DUE: 10/2/25 - 10/30/25    Verified that the patient has refills remaining in their prescription.

## 2025-07-19 LAB
GAMMA INTERFERON BACKGROUND BLD IA-ACNC: 0.19 IU/ML
M TB IFN-G BLD-IMP: NEGATIVE
M TB IFN-G CD4+ BCKGRND COR BLD-ACNC: 9.81 IU/ML
MITOGEN IGNF BCKGRD COR BLD-ACNC: -0.03 IU/ML
MITOGEN IGNF BCKGRD COR BLD-ACNC: -0.04 IU/ML
QUANTIFERON MITOGEN: 10 IU/ML
QUANTIFERON NIL TUBE: 0.19 IU/ML
QUANTIFERON TB1 TUBE: 0.15 IU/ML
QUANTIFERON TB2 TUBE: 0.16